# Patient Record
Sex: FEMALE | Race: OTHER | Employment: OTHER | URBAN - METROPOLITAN AREA
[De-identification: names, ages, dates, MRNs, and addresses within clinical notes are randomized per-mention and may not be internally consistent; named-entity substitution may affect disease eponyms.]

---

## 2019-05-17 ENCOUNTER — APPOINTMENT (OUTPATIENT)
Dept: GENERAL RADIOLOGY | Age: 69
DRG: 191 | End: 2019-05-17
Payer: MEDICARE

## 2019-05-17 ENCOUNTER — APPOINTMENT (OUTPATIENT)
Dept: CT IMAGING | Age: 69
DRG: 191 | End: 2019-05-17
Payer: MEDICARE

## 2019-05-17 ENCOUNTER — HOSPITAL ENCOUNTER (INPATIENT)
Age: 69
LOS: 9 days | Discharge: NURSING, REHAB OR LONG-TERM CARE | DRG: 191 | End: 2019-05-28
Attending: EMERGENCY MEDICINE | Admitting: INTERNAL MEDICINE
Payer: MEDICARE

## 2019-05-17 DIAGNOSIS — I63.81 CEREBROVASCULAR ACCIDENT (CVA) DUE TO OCCLUSION OF SMALL ARTERY (HCC): Primary | ICD-10-CM

## 2019-05-17 PROBLEM — G45.9 TIA (TRANSIENT ISCHEMIC ATTACK): Status: ACTIVE | Noted: 2019-05-17

## 2019-05-17 LAB
ALBUMIN SERPL-MCNC: 4.1 G/DL (ref 3.5–4.6)
ALP BLD-CCNC: 89 U/L (ref 40–130)
ALT SERPL-CCNC: 12 U/L (ref 0–33)
ANION GAP SERPL CALCULATED.3IONS-SCNC: 17 MEQ/L (ref 9–15)
AST SERPL-CCNC: 15 U/L (ref 0–35)
BACTERIA: ABNORMAL /HPF
BASOPHILS ABSOLUTE: 0.1 K/UL (ref 0–0.2)
BASOPHILS RELATIVE PERCENT: 0.6 %
BILIRUB SERPL-MCNC: 0.3 MG/DL (ref 0.2–0.7)
BILIRUBIN URINE: NEGATIVE
BLOOD, URINE: NEGATIVE
BUN BLDV-MCNC: 19 MG/DL (ref 8–23)
CALCIUM SERPL-MCNC: 9.3 MG/DL (ref 8.5–9.9)
CHLORIDE BLD-SCNC: 99 MEQ/L (ref 95–107)
CLARITY: CLEAR
CO2: 22 MEQ/L (ref 20–31)
COLOR: YELLOW
CREAT SERPL-MCNC: 0.68 MG/DL (ref 0.5–0.9)
EKG ATRIAL RATE: 101 BPM
EKG P AXIS: -6 DEGREES
EKG P-R INTERVAL: 156 MS
EKG Q-T INTERVAL: 342 MS
EKG QRS DURATION: 74 MS
EKG QTC CALCULATION (BAZETT): 443 MS
EKG R AXIS: 6 DEGREES
EKG T AXIS: -2 DEGREES
EKG VENTRICULAR RATE: 101 BPM
EOSINOPHILS ABSOLUTE: 0.5 K/UL (ref 0–0.7)
EOSINOPHILS RELATIVE PERCENT: 4.9 %
EPITHELIAL CELLS, UA: ABNORMAL /HPF (ref 0–5)
GFR AFRICAN AMERICAN: >60
GFR NON-AFRICAN AMERICAN: >60
GLOBULIN: 3.1 G/DL (ref 2.3–3.5)
GLUCOSE BLD-MCNC: 216 MG/DL (ref 60–115)
GLUCOSE BLD-MCNC: 219 MG/DL (ref 70–99)
GLUCOSE URINE: 250 MG/DL
HCT VFR BLD CALC: 40.9 % (ref 37–47)
HEMOGLOBIN: 13.9 G/DL (ref 12–16)
HYALINE CASTS: ABNORMAL /HPF (ref 0–5)
KETONES, URINE: NEGATIVE MG/DL
LEUKOCYTE ESTERASE, URINE: ABNORMAL
LYMPHOCYTES ABSOLUTE: 2.5 K/UL (ref 1–4.8)
LYMPHOCYTES RELATIVE PERCENT: 27 %
MCH RBC QN AUTO: 30.1 PG (ref 27–31.3)
MCHC RBC AUTO-ENTMCNC: 34 % (ref 33–37)
MCV RBC AUTO: 88.5 FL (ref 82–100)
MONOCYTES ABSOLUTE: 0.5 K/UL (ref 0.2–0.8)
MONOCYTES RELATIVE PERCENT: 4.9 %
NEUTROPHILS ABSOLUTE: 5.8 K/UL (ref 1.4–6.5)
NEUTROPHILS RELATIVE PERCENT: 62.6 %
NITRITE, URINE: POSITIVE
PDW BLD-RTO: 13.7 % (ref 11.5–14.5)
PERFORMED ON: ABNORMAL
PH UA: 5 (ref 5–9)
PLATELET # BLD: 262 K/UL (ref 130–400)
POTASSIUM SERPL-SCNC: 4.3 MEQ/L (ref 3.4–4.9)
PROTEIN UA: NEGATIVE MG/DL
RBC # BLD: 4.62 M/UL (ref 4.2–5.4)
RBC UA: ABNORMAL /HPF (ref 0–5)
SODIUM BLD-SCNC: 138 MEQ/L (ref 135–144)
SPECIFIC GRAVITY UA: 1.04 (ref 1–1.03)
TOTAL PROTEIN: 7.2 G/DL (ref 6.3–8)
TROPONIN: <0.01 NG/ML (ref 0–0.01)
URINE REFLEX TO CULTURE: YES
UROBILINOGEN, URINE: 0.2 E.U./DL
WBC # BLD: 9.3 K/UL (ref 4.8–10.8)
WBC UA: ABNORMAL /HPF (ref 0–5)

## 2019-05-17 PROCEDURE — 84484 ASSAY OF TROPONIN QUANT: CPT

## 2019-05-17 PROCEDURE — 70498 CT ANGIOGRAPHY NECK: CPT

## 2019-05-17 PROCEDURE — 94640 AIRWAY INHALATION TREATMENT: CPT

## 2019-05-17 PROCEDURE — G0378 HOSPITAL OBSERVATION PER HR: HCPCS

## 2019-05-17 PROCEDURE — 81001 URINALYSIS AUTO W/SCOPE: CPT

## 2019-05-17 PROCEDURE — 93005 ELECTROCARDIOGRAM TRACING: CPT

## 2019-05-17 PROCEDURE — 6370000000 HC RX 637 (ALT 250 FOR IP): Performed by: INTERNAL MEDICINE

## 2019-05-17 PROCEDURE — 6370000000 HC RX 637 (ALT 250 FOR IP): Performed by: EMERGENCY MEDICINE

## 2019-05-17 PROCEDURE — 36415 COLL VENOUS BLD VENIPUNCTURE: CPT

## 2019-05-17 PROCEDURE — 87077 CULTURE AEROBIC IDENTIFY: CPT

## 2019-05-17 PROCEDURE — 85025 COMPLETE CBC W/AUTO DIFF WBC: CPT

## 2019-05-17 PROCEDURE — 70450 CT HEAD/BRAIN W/O DYE: CPT

## 2019-05-17 PROCEDURE — 96365 THER/PROPH/DIAG IV INF INIT: CPT

## 2019-05-17 PROCEDURE — 99285 EMERGENCY DEPT VISIT HI MDM: CPT

## 2019-05-17 PROCEDURE — 6360000002 HC RX W HCPCS: Performed by: EMERGENCY MEDICINE

## 2019-05-17 PROCEDURE — 71045 X-RAY EXAM CHEST 1 VIEW: CPT

## 2019-05-17 PROCEDURE — 87086 URINE CULTURE/COLONY COUNT: CPT

## 2019-05-17 PROCEDURE — 70496 CT ANGIOGRAPHY HEAD: CPT

## 2019-05-17 PROCEDURE — 6360000004 HC RX CONTRAST MEDICATION: Performed by: EMERGENCY MEDICINE

## 2019-05-17 PROCEDURE — 2580000003 HC RX 258: Performed by: EMERGENCY MEDICINE

## 2019-05-17 PROCEDURE — 80053 COMPREHEN METABOLIC PANEL: CPT

## 2019-05-17 PROCEDURE — 87186 SC STD MICRODIL/AGAR DIL: CPT

## 2019-05-17 RX ORDER — CLOPIDOGREL BISULFATE 75 MG/1
75 TABLET ORAL ONCE
Status: COMPLETED | OUTPATIENT
Start: 2019-05-17 | End: 2019-05-17

## 2019-05-17 RX ORDER — ATORVASTATIN CALCIUM 40 MG/1
40 TABLET, FILM COATED ORAL NIGHTLY
Status: DISCONTINUED | OUTPATIENT
Start: 2019-05-17 | End: 2019-05-28 | Stop reason: HOSPADM

## 2019-05-17 RX ORDER — ONDANSETRON 2 MG/ML
4 INJECTION INTRAMUSCULAR; INTRAVENOUS EVERY 6 HOURS PRN
Status: DISCONTINUED | OUTPATIENT
Start: 2019-05-17 | End: 2019-05-28 | Stop reason: HOSPADM

## 2019-05-17 RX ORDER — CLOPIDOGREL 300 MG/1
300 TABLET, FILM COATED ORAL ONCE
Status: COMPLETED | OUTPATIENT
Start: 2019-05-17 | End: 2019-05-17

## 2019-05-17 RX ORDER — SODIUM CHLORIDE 0.9 % (FLUSH) 0.9 %
10 SYRINGE (ML) INJECTION PRN
Status: DISCONTINUED | OUTPATIENT
Start: 2019-05-17 | End: 2019-05-28 | Stop reason: HOSPADM

## 2019-05-17 RX ORDER — SODIUM CHLORIDE 0.9 % (FLUSH) 0.9 %
10 SYRINGE (ML) INJECTION EVERY 12 HOURS SCHEDULED
Status: DISCONTINUED | OUTPATIENT
Start: 2019-05-17 | End: 2019-05-28 | Stop reason: HOSPADM

## 2019-05-17 RX ORDER — ASPIRIN 81 MG/1
81 TABLET ORAL DAILY
Status: DISCONTINUED | OUTPATIENT
Start: 2019-05-18 | End: 2019-05-28 | Stop reason: HOSPADM

## 2019-05-17 RX ORDER — LABETALOL 20 MG/4 ML (5 MG/ML) INTRAVENOUS SYRINGE
10 EVERY 10 MIN PRN
Status: DISCONTINUED | OUTPATIENT
Start: 2019-05-17 | End: 2019-05-28 | Stop reason: HOSPADM

## 2019-05-17 RX ORDER — IPRATROPIUM BROMIDE AND ALBUTEROL SULFATE 2.5; .5 MG/3ML; MG/3ML
1 SOLUTION RESPIRATORY (INHALATION) EVERY 8 HOURS
Status: DISCONTINUED | OUTPATIENT
Start: 2019-05-17 | End: 2019-05-18

## 2019-05-17 RX ORDER — ALBUTEROL SULFATE 90 UG/1
2 AEROSOL, METERED RESPIRATORY (INHALATION) EVERY 6 HOURS PRN
COMMUNITY

## 2019-05-17 RX ORDER — 0.9 % SODIUM CHLORIDE 0.9 %
500 INTRAVENOUS SOLUTION INTRAVENOUS ONCE
Status: COMPLETED | OUTPATIENT
Start: 2019-05-17 | End: 2019-05-17

## 2019-05-17 RX ADMIN — IOPAMIDOL 100 ML: 755 INJECTION, SOLUTION INTRAVENOUS at 20:15

## 2019-05-17 RX ADMIN — SODIUM CHLORIDE 500 ML: 9 INJECTION, SOLUTION INTRAVENOUS at 19:53

## 2019-05-17 RX ADMIN — IPRATROPIUM BROMIDE AND ALBUTEROL SULFATE 1 AMPULE: .5; 3 SOLUTION RESPIRATORY (INHALATION) at 23:57

## 2019-05-17 RX ADMIN — CEFTRIAXONE SODIUM 1 G: 1 INJECTION, POWDER, FOR SOLUTION INTRAMUSCULAR; INTRAVENOUS at 21:12

## 2019-05-17 RX ADMIN — CLOPIDOGREL BISULFATE 300 MG: 300 TABLET, FILM COATED ORAL at 19:54

## 2019-05-17 RX ADMIN — CLOPIDOGREL BISULFATE 75 MG: 75 TABLET, FILM COATED ORAL at 20:25

## 2019-05-17 ASSESSMENT — ENCOUNTER SYMPTOMS
BLOOD IN STOOL: 0
WHEEZING: 0
TROUBLE SWALLOWING: 0
PHOTOPHOBIA: 0
COUGH: 0
STRIDOR: 0
ANAL BLEEDING: 0
ABDOMINAL DISTENTION: 0
SINUS PRESSURE: 0
CONSTIPATION: 0
NAUSEA: 0
EYE ITCHING: 0
CHEST TIGHTNESS: 0
DIARRHEA: 0
COLOR CHANGE: 0
RHINORRHEA: 0
ABDOMINAL PAIN: 0
FACIAL SWELLING: 0
EYE REDNESS: 0
EYE PAIN: 0
SHORTNESS OF BREATH: 0
VOICE CHANGE: 0
EYE DISCHARGE: 0
CHOKING: 0
SORE THROAT: 0
VOMITING: 0
BACK PAIN: 0

## 2019-05-17 NOTE — ED TRIAGE NOTES
Pt c/o trouble moving her right leg after waking up from a nap today aprrox 1700, last known well time was 1400 today, Pt is A&OX3, calm, afebrile, breathes are equal and unlabored, sensation intact in RLE, pulses palpable, Pt has slight leeft side facial droop, denies pain, SOB, N/V/D, and dizziness.

## 2019-05-18 ENCOUNTER — APPOINTMENT (OUTPATIENT)
Dept: MRI IMAGING | Age: 69
DRG: 191 | End: 2019-05-18
Payer: MEDICARE

## 2019-05-18 ENCOUNTER — APPOINTMENT (OUTPATIENT)
Dept: ULTRASOUND IMAGING | Age: 69
DRG: 191 | End: 2019-05-18
Payer: MEDICARE

## 2019-05-18 ENCOUNTER — APPOINTMENT (OUTPATIENT)
Dept: GENERAL RADIOLOGY | Age: 69
DRG: 191 | End: 2019-05-18
Payer: MEDICARE

## 2019-05-18 LAB
ANION GAP SERPL CALCULATED.3IONS-SCNC: 13 MEQ/L (ref 9–15)
BUN BLDV-MCNC: 12 MG/DL (ref 8–23)
CALCIUM SERPL-MCNC: 8.8 MG/DL (ref 8.5–9.9)
CHLORIDE BLD-SCNC: 101 MEQ/L (ref 95–107)
CHOLESTEROL, TOTAL: 147 MG/DL (ref 0–199)
CO2: 24 MEQ/L (ref 20–31)
CREAT SERPL-MCNC: 0.51 MG/DL (ref 0.5–0.9)
GFR AFRICAN AMERICAN: >60
GFR NON-AFRICAN AMERICAN: >60
GLUCOSE BLD-MCNC: 152 MG/DL (ref 60–115)
GLUCOSE BLD-MCNC: 169 MG/DL (ref 60–115)
GLUCOSE BLD-MCNC: 243 MG/DL (ref 70–99)
GLUCOSE BLD-MCNC: 247 MG/DL (ref 60–115)
GLUCOSE BLD-MCNC: 248 MG/DL (ref 60–115)
GLUCOSE BLD-MCNC: 267 MG/DL (ref 60–115)
GLUCOSE BLD-MCNC: 293 MG/DL (ref 60–115)
HBA1C MFR BLD: 10 % (ref 4.8–5.9)
HCT VFR BLD CALC: 38.1 % (ref 37–47)
HDLC SERPL-MCNC: 45 MG/DL (ref 40–59)
HEMOGLOBIN: 12.5 G/DL (ref 12–16)
LDL CHOLESTEROL CALCULATED: 83 MG/DL (ref 0–129)
LV EF: 65 %
LVEF MODALITY: NORMAL
MCH RBC QN AUTO: 28.9 PG (ref 27–31.3)
MCHC RBC AUTO-ENTMCNC: 32.9 % (ref 33–37)
MCV RBC AUTO: 87.7 FL (ref 82–100)
PDW BLD-RTO: 13.8 % (ref 11.5–14.5)
PERFORMED ON: ABNORMAL
PLATELET # BLD: 231 K/UL (ref 130–400)
POTASSIUM SERPL-SCNC: 3.6 MEQ/L (ref 3.4–4.9)
RBC # BLD: 4.34 M/UL (ref 4.2–5.4)
SODIUM BLD-SCNC: 138 MEQ/L (ref 135–144)
TRIGL SERPL-MCNC: 97 MG/DL (ref 0–150)
TROPONIN: <0.01 NG/ML (ref 0–0.01)
TROPONIN: <0.01 NG/ML (ref 0–0.01)
WBC # BLD: 8.2 K/UL (ref 4.8–10.8)

## 2019-05-18 PROCEDURE — G0378 HOSPITAL OBSERVATION PER HR: HCPCS

## 2019-05-18 PROCEDURE — 94664 DEMO&/EVAL PT USE INHALER: CPT

## 2019-05-18 PROCEDURE — 93306 TTE W/DOPPLER COMPLETE: CPT

## 2019-05-18 PROCEDURE — 6370000000 HC RX 637 (ALT 250 FOR IP): Performed by: INTERNAL MEDICINE

## 2019-05-18 PROCEDURE — 71046 X-RAY EXAM CHEST 2 VIEWS: CPT

## 2019-05-18 PROCEDURE — 94640 AIRWAY INHALATION TREATMENT: CPT

## 2019-05-18 PROCEDURE — 97162 PT EVAL MOD COMPLEX 30 MIN: CPT

## 2019-05-18 PROCEDURE — 97166 OT EVAL MOD COMPLEX 45 MIN: CPT

## 2019-05-18 PROCEDURE — 6360000002 HC RX W HCPCS: Performed by: INTERNAL MEDICINE

## 2019-05-18 PROCEDURE — 96372 THER/PROPH/DIAG INJ SC/IM: CPT

## 2019-05-18 PROCEDURE — 80061 LIPID PANEL: CPT

## 2019-05-18 PROCEDURE — 2580000003 HC RX 258: Performed by: INTERNAL MEDICINE

## 2019-05-18 PROCEDURE — 85027 COMPLETE CBC AUTOMATED: CPT

## 2019-05-18 PROCEDURE — 83036 HEMOGLOBIN GLYCOSYLATED A1C: CPT

## 2019-05-18 PROCEDURE — 94760 N-INVAS EAR/PLS OXIMETRY 1: CPT

## 2019-05-18 PROCEDURE — 84484 ASSAY OF TROPONIN QUANT: CPT

## 2019-05-18 PROCEDURE — 80048 BASIC METABOLIC PNL TOTAL CA: CPT

## 2019-05-18 PROCEDURE — 94150 VITAL CAPACITY TEST: CPT

## 2019-05-18 PROCEDURE — 36415 COLL VENOUS BLD VENIPUNCTURE: CPT

## 2019-05-18 PROCEDURE — 70551 MRI BRAIN STEM W/O DYE: CPT

## 2019-05-18 PROCEDURE — 93880 EXTRACRANIAL BILAT STUDY: CPT

## 2019-05-18 RX ORDER — ALBUTEROL SULFATE 2.5 MG/3ML
2.5 SOLUTION RESPIRATORY (INHALATION)
Status: DISCONTINUED | OUTPATIENT
Start: 2019-05-18 | End: 2019-05-19

## 2019-05-18 RX ORDER — WATER FOR INJ.,BACTERIOSTATIC
VIAL (ML) INJECTION
Status: DISPENSED
Start: 2019-05-18 | End: 2019-05-18

## 2019-05-18 RX ORDER — DEXTROSE MONOHYDRATE 50 MG/ML
100 INJECTION, SOLUTION INTRAVENOUS PRN
Status: DISCONTINUED | OUTPATIENT
Start: 2019-05-18 | End: 2019-05-28 | Stop reason: HOSPADM

## 2019-05-18 RX ORDER — NICOTINE POLACRILEX 4 MG
15 LOZENGE BUCCAL PRN
Status: DISCONTINUED | OUTPATIENT
Start: 2019-05-18 | End: 2019-05-28 | Stop reason: HOSPADM

## 2019-05-18 RX ORDER — IPRATROPIUM BROMIDE AND ALBUTEROL SULFATE 2.5; .5 MG/3ML; MG/3ML
1 SOLUTION RESPIRATORY (INHALATION) 3 TIMES DAILY
Status: DISCONTINUED | OUTPATIENT
Start: 2019-05-18 | End: 2019-05-19

## 2019-05-18 RX ORDER — DEXTROSE MONOHYDRATE 25 G/50ML
12.5 INJECTION, SOLUTION INTRAVENOUS PRN
Status: DISCONTINUED | OUTPATIENT
Start: 2019-05-18 | End: 2019-05-28 | Stop reason: HOSPADM

## 2019-05-18 RX ORDER — GUAIFENESIN 600 MG/1
600 TABLET, EXTENDED RELEASE ORAL 2 TIMES DAILY
Status: DISCONTINUED | OUTPATIENT
Start: 2019-05-18 | End: 2019-05-28 | Stop reason: HOSPADM

## 2019-05-18 RX ADMIN — Medication 10 ML: at 20:38

## 2019-05-18 RX ADMIN — ENOXAPARIN SODIUM 40 MG: 40 INJECTION SUBCUTANEOUS at 09:29

## 2019-05-18 RX ADMIN — GUAIFENESIN 600 MG: 600 TABLET, EXTENDED RELEASE ORAL at 01:40

## 2019-05-18 RX ADMIN — ATORVASTATIN CALCIUM 40 MG: 40 TABLET, FILM COATED ORAL at 01:40

## 2019-05-18 RX ADMIN — INSULIN LISPRO 6 UNITS: 100 INJECTION, SOLUTION INTRAVENOUS; SUBCUTANEOUS at 12:21

## 2019-05-18 RX ADMIN — ASPIRIN 81 MG: 81 TABLET, COATED ORAL at 09:29

## 2019-05-18 RX ADMIN — INSULIN LISPRO 4 UNITS: 100 INJECTION, SOLUTION INTRAVENOUS; SUBCUTANEOUS at 09:29

## 2019-05-18 RX ADMIN — GUAIFENESIN 600 MG: 600 TABLET, EXTENDED RELEASE ORAL at 20:37

## 2019-05-18 RX ADMIN — IPRATROPIUM BROMIDE AND ALBUTEROL SULFATE 1 AMPULE: .5; 3 SOLUTION RESPIRATORY (INHALATION) at 14:24

## 2019-05-18 RX ADMIN — IPRATROPIUM BROMIDE AND ALBUTEROL SULFATE 1 AMPULE: .5; 3 SOLUTION RESPIRATORY (INHALATION) at 07:49

## 2019-05-18 RX ADMIN — IPRATROPIUM BROMIDE AND ALBUTEROL SULFATE 1 AMPULE: .5; 3 SOLUTION RESPIRATORY (INHALATION) at 19:39

## 2019-05-18 RX ADMIN — ATORVASTATIN CALCIUM 40 MG: 40 TABLET, FILM COATED ORAL at 20:37

## 2019-05-18 RX ADMIN — Medication 10 ML: at 01:44

## 2019-05-18 NOTE — ED NOTES
Patient to bedside commode with standby assist, she was stable on feet.       Karyle Do, RN  05/17/19 2034

## 2019-05-18 NOTE — PROGRESS NOTES
[facility] EMERGENCY [facility] AT [place] Respiratory Therapy Evaluation   Current Order: Alexis Caraballo Q8     Home Regimen: PRN    Ordering Physician: Debbie  Re-evaluation Date:  5/21     Diagnosis: CVA     Patient Status: Stable / Unstable + Physician notified    The following MDI Criteria must be met in order to convert aerosol to MDI with spacer. If unable to meet, MDI will be converted to aerosol:  []  Patient able to demonstrate the ability to use MDI effectively  []  Patient alert and cooperative  []  Patient able to take deep breath with 5-10 second hold  []  Medication(s) available in this delivery method   []  Peak flow greater than or equal to 200 ml/min            Current Order Substituted To  (same drug, same frequency)   Aerosol to MDI [] Albuterol Sulfate 0.083% unit dose by aerosol Albuterol Sulfate MDI 2 puffs by inhalation with spacer    [] Levalbuterol 1.25 mg unit dose by aerosol Levalbuterol MDI 2 puffs by inhalation with spacer    [] Levalbuterol 0.63 mg unit dose by aerosol Levalbuterol MDI 2 puffs by inhalation with spacer    [] Ipratropium Bromide 0.02% unit dose by aerosol Ipratropium Bromide MDI 2 puffs by inhalation with spacer    [] Duoneb (Ipratropium + Albuterol) unit dose by aerosol Ipratropium MDI + Albuterol MDI 2 puffs by inhalation w/spacer   MDI to Aerosol [] Albuterol Sulfate MDI Albuterol Sulfate 0.083% unit dose by aerosol    [] Levalbuterol MDI 2 puffs by inhalation Levalbuterol 1.25 mg unit dose by aerosol    [] Ipratropium Bromide MDI by inhalation Ipratropium Bromide 0.02% unit dose by aerosol    [] Combivent (Ipratropium + Albuterol) MDI by inhalation Duoneb (Ipratropium + Albuterol) unit dose by aerosol   Treatment Assessment [Frequency/Schedule]:  Change frequency to: ______________Duoneb QID & Albuterol Q2 PRN____________________________________per Protocol, P&T, MEC      Points 0 1 2 3 4   Pulmonary Status  Non-Smoker  []   Smoking history   < 20 pack years  []   Smoking history  ?  20 pack years  [] Pulmonary Disorder  (acute or chronic)  []   Severe or Chronic w/ Exacerbation  [x]     Surgical Status No [x]   Surgeries     General []   Surgery Lower []   Abdominal Thoracic or []   Upper Abdominal Thoracic with  PulmonaryDisorder  []     Chest X-ray Clear/Not  Ordered     []  Chronic Changes  Results Pending  [x]  Infiltrates, atelectasis, pleural effusion, or edema  []  Infiltrates in more than one lobe []  Infiltrate + Atelectasis, &/or pleural effusion  []    Respiratory Pattern Regular,  RR = 12-20 [x]  Increased,  RR = 21-25 [x]  ALBERTS, irregular,  or RR = 26-30 []  Decreased FEV1  or RR = 31-35 []  Severe SOB, use  of accessory muscles, or RR ? 35  []    Mental Status Alert, oriented,  Cooperative [x]  Confused but Follows commands []  Lethargic or unable to follow commands []  Obtunded  []  Comatose  []    Breath Sounds Clear to  auscultation  []  Decreased unilaterally or  in bases only []  Decreased  bilaterally  []  Crackles or intermittent wheezes []  Wheezes [x]    Cough Strong, Spontan., & nonproductive [x]  Strong,  spontaneous, &  productive []  Weak,  Nonproductive []  Weak, productive or  with wheezes []  No spontaneous  cough or may require suctioning []    Level of Activity Ambulatory []  Ambulatory w/ Assist  [x]  Non-ambulatory []  Paraplegic []  Quadriplegic []    Total    Score:____11___     Triage Score:___3_____      Tri       Triage:     1. (>20) Freq: Q3    2. (16-20) Freq: Q4   3. (11-15) Freq: QID & Albuterol Q2 PRN    4. (6-10) Freq: TID & Albuterol Q2 PRN    5. (0-5) Freq Q4prn

## 2019-05-18 NOTE — PROGRESS NOTES
Status:  Vision  Vision: Impaired  Vision Exceptions: Wears glasses for reading  Hearing  Hearing: Within functional limits     ROM:   LUE AROM (degrees)  LUE AROM : WFL  Left Hand AROM (degrees)  Left Hand AROM: WFL  RUE AROM (degrees)  RUE AROM : WFL  Right Hand AROM (degrees)  Right Hand AROM: WFL    Strength:  LUE Strength  L Hand Grasp: 4-/5  L Hand Release: 4-/5  LUE Strength Comment: 4/5  RUE Strength  R Hand Grasp: 4/5  R Hand Release: 4/5  RUE Strength Comment: 4/5     Coordination, Tone, Quality of Movement: Tone RUE  RUE Tone: Normotonic  Tone LUE  LUE Tone: Normotonic  Coordination  Movements Are Fluid And Coordinated: No  Coordination and Movement description: Decreased speed, Right UE, Left UE    Hand Dominance:  Hand Dominance  Hand Dominance: Left    ADL Status:  ADL  Feeding: Independent  Grooming: Setup  UE Bathing: Supervision  LE Bathing: Stand by assistance, Contact guard assistance  UE Dressing: Setup  LE Dressing: Contact guard assistance, Stand by assistance  Toileting: Stand by assistance  Additional Comments: ADL's simulated or perfomred on [facility]  Toilet Transfers  Toilet Transfer: Stand by assistance, Contact guard assistance  Toilet Transfers Comments: from [facility]       Functional Mobility:  Functional Mobility  Functional - Mobility Device: Rolling Walker  Activity: Other(BSC to chair )  Assist Level: Contact guard assistance(SBA/CGA)  Transfers  Sit to stand: Stand by assistance, Contact guard assistance  Stand to sit: Contact guard assistance, Stand by assistance    Bed Mobility  Bed mobility  Supine to Sit: Unable to assess  Sit to Supine: Unable to assess  Comment: Pt seated prior and after OT eval.     Seated and Standing Balance:  Balance  Sitting Balance: Supervision  Standing Balance: Contact guard assistance    Functional Endurance:  Activity Tolerance  Activity Tolerance: Fair SPO2 98% prior and after OT eval while donning O2.      D/C Recommendations:    Equipment

## 2019-05-18 NOTE — PROGRESS NOTES
[facility] Occupational Therapy      Date: 2019  Patient Name: Josie Eubanks        MRN: [id number]  Account: [de-identified]   : 1950  (79 y.o.)  [facility]    Pt. is of observation status. To comply with medicare and insurance regulations, to see patient we require updated orders including a valid OT treatment diagnosis. Please reorder as appropriate.      Electronically signed by GWEN Lee on 2019 at 11:57 AM

## 2019-05-18 NOTE — PROGRESS NOTES
This patient is currently in Observation/Out-Patient Status. Due to Medicare, other insurance and Hospital Guidelines, a written order with a therapy specific diagnosis (dysphagia, dysarthria, aphasia) must be in the chart before we can initiate the evaluation.   [id number] [facility] Ada Velazquez notified  Thank You Shaina Hanks  Electronically signed by IMAN Cuellar on 5/18/2019 at 10:35 AM

## 2019-05-18 NOTE — H&P
Hospital Medicine  History and Physical    Patient:  Delon Castillo  MRN: [id number]    CHIEF COMPLAINT:    Chief Complaint   Patient presents with    Extremity Weakness     right leg weakness, woke up from a nap at 1730 today and right leg is weak       History Obtained From:  patient, electronic medical record  Primary Care Physician: Hilary Miller MD    HISTORY OF PRESENT ILLNESS:   The patient is a 79 y.o. female with a history of HTN, DLD, DMII, Asthma presenting from home with RLE weakness. Pt took a nap around 2pm, woke up around 530 with RLE weakness. She was not able to walk. No visual changes. No sensory deficit. She was not able to walk around her house so she called her brother to bring her to ED. On arrival symptoms improved. Neuro was contacted and reccommended 300 mg plavix. Pt notes that she has had a TIA in the past, with similar RLE weakness around two weeks ago that resolved spontaneously. She otherwise has HTN and takes an unkown pill and is on metformin for DM. Denies any fevers chills, chest pain, palpitations, nvdc. In ED vitals are stable, she is afebrile. Labs WNL. UA does show pyuria with moderate bacteria, but she denies any frequency, dysuria, or feeling of incomplete void. Past Medical History:      Diagnosis Date    Asthma     Bladder infection     Chicken pox     Chronic back pain     Hypertension     Measles     Mumps     Osteoarthritis     Pneumonia     Whooping cough        Past Surgical History:      Procedure Laterality Date    APPENDECTOMY  1978    HYSTERECTOMY  26    TONSILLECTOMY  1966       Medications Prior to Admission:    Prior to Admission medications    Medication Sig Start Date End Date Taking?  Authorizing Provider   albuterol sulfate  (90 Base) MCG/ACT inhaler Inhale 2 puffs into the lungs every 6 hours as needed for Wheezing   Yes Historical Provider, MD   metFORMIN (GLUCOPHAGE) 1000 MG tablet Take 1,000 mg by mouth 2 times daily (with meals) Yes Historical Provider, MD   HYDROcodone-acetaminophen [facility])  MG per tablet Take 1 tablet by mouth 2 times daily as needed for Pain 2/3/16 3/4/16  Surya Lizama MD   HYDROcodone-acetaminophen [facility])  MG per tablet Take 1 tablet by mouth 3 times daily as needed for Pain 8/10/15 9/9/15  Surya Lizama MD   HYDROcodone-acetaminophen [facility])  MG per tablet Take 1 tablet by mouth every 8 hours as needed for Pain 7/8/15 8/7/15  Surya Lizama MD       Allergies:  Patient has no known allergies. Social History:   TOBACCO:   reports that she quit smoking about 9 years ago. She has never used smokeless tobacco.  ETOH:   reports that she does not drink alcohol. OCCUPATION:  none    Family History:       Problem Relation Age of Onset    Cancer Mother     Cancer Father        REVIEW OF SYSTEMS:  Ten systems reviewed and negative except for as above. Physical Exam:    Vitals: /79   Pulse 91   Temp 98 °F (36.7 °C) (Oral)   Resp 20   Ht 5' 3\" (1.6 m)   Wt 195 lb (88.5 kg)   LMP  (LMP Unknown)   SpO2 100%   BMI 34.54 kg/m²   Constitutional: alert, appears stated age and cooperative  Skin: Skin color, texture, turgor normal. No rashes or lesions  Eyes:Eye: Normal external eye, conjunctiva, JEROME. ENT: Head: Normocephalic, no lesions, without obvious abnormality. Neck: no adenopathy, no carotid bruit, no JVD, supple, symmetrical, trachea midline and thyroid not enlarged, symmetric, no tenderness/mass/nodules  Respiratory: clear to auscultation bilaterally  Cardiovascular: regular rate and rhythm, S1, S2 normal, no murmur, click, rub or gallop  Gastrointestinal: soft, non-tender; bowel sounds normal; no masses,  no organomegaly  Genitourinary: Deferred  Musculoskeletal:extremities normal, atraumatic, no cyanosis or edema  Neurologic: Mental status AAOx3 No facial asymmetry or droop. Normal muscle strength b/l. NIHSS0  Psychiatric: Appropriate mood and affect.  Good insight and

## 2019-05-18 NOTE — PROGRESS NOTES
Note patient had C/O SOB and resp called for PRN TX. Respitory came and patient refused at that time despite education. Resp left . After a while patient went to MRI. At MRI patient was SOB again with SPO2 in mid to high 90s. Resp called to give PRN down at MRI.

## 2019-05-18 NOTE — CARE COORDINATION
I met with patient regarding discharge planning. She states she lives alone. She uses a walker. She has O2 on here but no home O2. She does have a nebulizer. She states she can go stay with her brother at discharge if needed but she would prefer to go somewhere for rehab. Therapy just assessed and they are recommending rehab vs SNF at discharge. I gave her the SNF list.  She is currently only meeting obs status. She will have an MRI shortly to see if she is positive for a stroke. She will review the list and I will meet with her again tomorrow.   Electronically signed by Carina Elam RN on 5/18/19 at 1:47 PM

## 2019-05-18 NOTE — PROGRESS NOTES
Physical Therapy Med Surg Initial Assessment  Facility/Department: [facility]  Room: N225/N225-01       NAME: Xiomy Lundberg  : 1950 (79 y.o.)  MRN: [id number]  CODE STATUS: Full Code    Date of Service: 2019    Patient Diagnosis(es): TIA (transient ischemic attack) [G45.9]   Chief Complaint   Patient presents with    Extremity Weakness     right leg weakness, woke up from a nap at 1730 today and right leg is weak     Patient Active Problem List    Diagnosis Date Noted    TIA (transient ischemic attack) 2019    Lumbosacral spondylosis without myelopathy 2015    Cervical spondylosis without myelopathy 2015        Past Medical History:   Diagnosis Date    Asthma     Bladder infection     Chicken pox     Chronic back pain     Hypertension     Measles     Mumps     Osteoarthritis     Pneumonia     Whooping cough      Past Surgical History:   Procedure Laterality Date    APPENDECTOMY      HYSTERECTOMY      TONSILLECTOMY         Chart Reviewed: Yes  Patient assessed for rehabilitation services?: Yes  Family / Caregiver Present: No    Restrictions:  Restrictions/Precautions: Fall Risk     SUBJECTIVE: Subjective: Pt denies pain, reports she still has R sided LE weakness  Response To Previous Treatment: Not applicable  Pre Treatment Pain Screening  Pain at present: 0  Scale Used: Numeric Score    Post Treatment Pain Screening:   Pain Screening  Patient Currently in Pain: No    Prior Level of Function:  Social/Functional History  Lives With: Alone  Type of Home: Apartment  Home Layout: One level  Home Access: Level entry  Bathroom Shower/Tub: Tub/Shower unit  Bathroom Equipment: Shower chair  Home Equipment: Rolling walker, Oxygen(pt unsure of L o2 at home )  ADL Assistance: Independent  Homemaking Assistance: Needs assistance(pt reported dtr assist with cleaning )  Ambulation Assistance: Independent  Transfer Assistance: Independent  Active : No(dtr drives )    OBJECTIVE:   Vision/Hearing:  Vision: Impaired  Vision Exceptions: Wears glasses for reading  Hearing: Within functional limits    Cognition:  Follows Commands: Within Functional Limits    Observation/Palpation  Observation: Pt on BSC, O2 at 2L. No signs of distress. ROM:   limited R ankle ROM d/t strength, foot drop    Strength:  Strength RLE  Strength RLE: Exception  R Hip Flexion: 3/5  R Hip ABduction: 3/5  R Hip ADduction: 3+/5  R Knee Flexion: 3+/5  R Knee Extension: 3+/5  R Ankle Dorsiflexion: 2-/5  R Ankle Plantar flexion: 2/5  Strength LLE  Comment: grossly 4+/5    Neuro:  Balance  Sitting - Static: Good  Sitting - Dynamic: Good  Standing - Static: Fair  Standing - Dynamic: Fair;-     Motor Control  Gross Motor?: Exceptions  Comments: R LE weakness  Sensation  Overall Sensation Status: WFL(pt denies n/t )    Bed mobility  Supine to Sit: Unable to assess  Sit to Supine: Unable to assess  Comment: Pt sitting on commode prior to PT arrival    Transfers  Sit to Stand: Contact guard assistance  Stand to sit: Contact guard assistance    Ambulation  Ambulation?: Yes  Ambulation 1  Surface: level tile  Device: Rolling Walker  Assistance: Contact guard assistance  Quality of Gait: significant weakness in R LE, decreased step length, drop foot (incompensated), decrease foot clearance, toed out  Distance: 25ft  Comments: with turns, VC for safety with negotiating room environement and safety with turning with 2ww    Activity Tolerance  Activity Tolerance: Patient Tolerated treatment well    Exercises  Comments: seated HEP handout reviewed and given to pt this date     ASSESSMENT:   Body structures, Functions, Activity limitations: Decreased functional mobility ; Decreased ROM; Decreased balance;Decreased strength;Decreased high-level IADLs  Decision Making: Medium Complexity  History: med  Exam: med  Clinical Presentation: med    Prognosis: Good  Patient Education: PT POC, PT dx, PT prognosis, HEP handout given  Barriers to Learning: none    DISCHARGE RECOMMENDATIONS:  Discharge Recommendations: Continue to assess pending progress    Assessment: Pt demonstrates the above deficits and decline in functional mobility status placing them at increased risk for falls. Pt would benefit from physical therapy to address above deficits and allow for safe return home at highest level of function, decrease risk for falls, and improve QOL. REQUIRES PT FOLLOW UP: Yes      PLAN OF CARE:  Plan  Times per week: 3-6  Current Treatment Recommendations: Strengthening, ROM, Functional Mobility Training, Neuromuscular Re-education, Manual Therapy - Joint Manipulation, Home Exercise Program, Equipment Evaluation, Education, & procurement, Modalities, Manual Therapy - Soft Tissue Mobilization, Gait Training, Transfer Training, Balance Training, Endurance Training, Stair training, Pain Management, Patient/Caregiver Education & Training, Safety Education & Training, Positioning  Safety Devices  Type of devices:  All fall risk precautions in place, Left in chair, Chair alarm in place    Goals:  Patient goals : \"walk better, go home\"  Long term goals  Long term goal 1: indep with HEP to improve R LE strength  Long term goal 2: bed mobility with indep   Long term goal 3: functional transfers with mod I  Long term goal 4: amb 50ft with LRAD and mod I    AMPAC (6 CLICK) BASIC MOBILITY  AM-PAC Inpatient Mobility Raw Score : 16     Therapy Time:   Individual   Time In 1321   Time Out 1338   Minutes [address], [address] [facility], 05/18/19 at 1:47 PM

## 2019-05-18 NOTE — PROGRESS NOTES
Hospitalist Progress Note      PCP: Urvashi Hua MD    Date of Admission: 5/17/2019    Chief Complaint:  No acute events overnight,afebrile,stable HD    Medications:  Reviewed    Infusion Medications    dextrose       Scheduled Medications    ipratropium-albuterol  1 ampule Inhalation TID    insulin lispro  0-12 Units Subcutaneous TID WC    insulin lispro  0-6 Units Subcutaneous Nightly    guaiFENesin  600 mg Oral BID    water, bacteriostatic        sodium chloride flush  10 mL Intravenous 2 times per day    enoxaparin  40 mg Subcutaneous Daily    aspirin  81 mg Oral Daily    atorvastatin  40 mg Oral Nightly     PRN Meds: albuterol, glucose, dextrose, glucagon (rDNA), dextrose, sodium chloride flush, magnesium hydroxide, ondansetron, labetalol      Intake/Output Summary (Last 24 hours) at 5/18/2019 1331  Last data filed at 5/17/2019 2203  Gross per 24 hour   Intake 550 ml   Output --   Net 550 ml       Exam:    BP (!) 142/60   Pulse 86   Temp 98 °F (36.7 °C) (Oral)   Resp 18   Ht 5' 3\" (1.6 m)   Wt 195 lb (88.5 kg)   LMP  (LMP Unknown)   SpO2 99%   BMI 34.54 kg/m²     General appearance: No apparent distress, appears stated age and cooperative. Respiratory:  Clear to auscultation, bilaterally without Rales/Wheezes/Rhonchi. Cardiovascular: Regular rate and rhythm with normal S1/S2 . Abdomen: Soft, non-tender, active bowel sounds. Musculoskeletal: No cyanosis or edema bilaterally. Labs:   Recent Labs     05/17/19 1913 05/18/19  0601   WBC 9.3 8.2   HGB 13.9 12.5   HCT 40.9 38.1    231     Recent Labs     05/17/19 1913 05/18/19  0601    138   K 4.3 3.6   CL 99 101   CO2 22 24   BUN 19 12   CREATININE 0.68 0.51   CALCIUM 9.3 8.8     Recent Labs     05/17/19 1913   AST 15   ALT 12   BILITOT 0.3   ALKPHOS 89     No results for input(s): INR in the last 72 hours.   Recent Labs     05/17/19 1913 05/18/19  0601 05/18/19  1154   TROPONINI <0.010 <0.010 <0.010       Urinalysis:

## 2019-05-18 NOTE — CONSULTS
Consult to Neurology  Consult performed by: Kenneth Garcia MD  Consult ordered by: Miki Crooks DO          Patient Name: Rayshawn Mcdonald  Admit Date: 2019  6:50 PM  MR #: [id number]  : 1950    Attending Physician: Edwin Kiser MD  Reason for consult: Right lower extremity weakness    History of Presenting Illness:      Rayshawn Mcdonald is a 79 y.o. female, who according to the H and P, \"with a history of HTN, DLD, DMII, Asthma presenting from home with RLE weakness. Pt took a nap around 2pm, woke up around 530 with RLE weakness. She was not able to walk. No visual changes. No sensory deficit. She was not able to walk around her house so she called her brother to bring her to ED. On arrival symptoms improved. Neuro was contacted and reccommended 300 mg plavix. Pt notes that she has had a TIA in the past, with similar RLE weakness around two weeks ago that resolved spontaneously. She otherwise has HTN and takes an unkown pill and is on metformin for DM. Denies any fevers chills, chest pain, palpitations, nvdc. In ED vitals are stable, she is afebrile. Labs WNL. UA does show pyuria with moderate bacteria, but she denies any frequency, dysuria, or feeling of incomplete void. \"     Still feels some right leg weakness. Shortness of breath due to her underlying asthma, just finishing a breathing treatment.     History:      Past Medical History:   Diagnosis Date    Asthma     Bladder infection     Chicken pox     Chronic back pain     Hypertension     Measles     Mumps     Osteoarthritis     Pneumonia     Whooping cough      Past Surgical History:   Procedure Laterality Date    APPENDECTOMY  1978    HYSTERECTOMY  26    TONSILLECTOMY  1966       Family History  Family History   Problem Relation Age of Onset    Cancer Mother     Cancer Father      [] Unable to obtain due to ventilated and/ or neurologic status    Social History     Socioeconomic History    Marital status:      Spouse name: Not on file    Number of children: Not on file    Years of education: Not on file    Highest education level: Not on file   Occupational History    Not on file   Social Needs    Financial resource strain: Not on file    Food insecurity:     Worry: Not on file     Inability: Not on file    Transportation needs:     Medical: Not on file     Non-medical: Not on file   Tobacco Use    Smoking status: Former Smoker     Last attempt to quit: 10/19/2009     Years since quittin.5    Smokeless tobacco: Never Used   Substance and Sexual Activity    Alcohol use: No     Comment: recovering alcoholic    Drug use: Never    Sexual activity: Not on file   Lifestyle    Physical activity:     Days per week: Not on file     Minutes per session: Not on file    Stress: Not on file   Relationships    Social connections:     Talks on phone: Not on file     Gets together: Not on file     Attends Druze service: Not on file     Active member of club or organization: Not on file     Attends meetings of clubs or organizations: Not on file     Relationship status: Not on file    Intimate partner violence:     Fear of current or ex partner: Not on file     Emotionally abused: Not on file     Physically abused: Not on file     Forced sexual activity: Not on file   Other Topics Concern    Not on file   Social History Narrative    Not on file      [] Unable to obtain due to ventilated and/ or neurologic status      Home Medications:      Medications Prior to Admission: albuterol sulfate  (90 Base) MCG/ACT inhaler, Inhale 2 puffs into the lungs every 6 hours as needed for Wheezing  metFORMIN (GLUCOPHAGE) 1000 MG tablet, Take 1,000 mg by mouth 2 times daily (with meals)  HYDROcodone-acetaminophen (NORCO)  MG per tablet, Take 1 tablet by mouth 2 times daily as needed for Pain  HYDROcodone-acetaminophen (NORCO)  MG per tablet, Take 1 tablet by mouth 3 times daily as needed for Pain  HYDROcodone-acetaminophen (NORCO)  MG per tablet, Take 1 tablet by mouth every 8 hours as needed for Pain    Current Hospital Medications:     Scheduled Meds:   ipratropium-albuterol  1 ampule Inhalation TID    insulin lispro  0-12 Units Subcutaneous TID WC    insulin lispro  0-6 Units Subcutaneous Nightly    guaiFENesin  600 mg Oral BID    water, bacteriostatic        sodium chloride flush  10 mL Intravenous 2 times per day    enoxaparin  40 mg Subcutaneous Daily    aspirin  81 mg Oral Daily    atorvastatin  40 mg Oral Nightly     Continuous Infusions:   dextrose       PRN Meds:.albuterol, glucose, dextrose, glucagon (rDNA), dextrose, sodium chloride flush, magnesium hydroxide, ondansetron, labetalol  .  dextrose          Allergies:     No Known Allergies     Review of Systems:       Constitutional: Patient denies fever, chills, night sweats, weight loss, or change an appetite. Eyes: No vision change, eye pain, or double vision. Ears, nose, mouth, throat: No change in the hearing, or sinus congestion. Cardiovascular: Denies orthopnea, palpitations, or chest pain. Respiratory: No cough or exertional dyspnea. GI: No heartburn, nausea, vomiting, diarrhea, or constipation. : Theres no loss of control of bowel or bladder, no urinary urgency, no urinary retention. MSK: No arthralgia or myalgia. Skin: Denies rashes. Neurological problems as discussed above. Psychiatric: No depression, anxiety, hallucinations. No suicidal ideation. Hematologic: No mucosal bleeding or easy bruising. Objective Findings:     Vitals:BP (!) 142/60   Pulse 86   Temp 98 °F (36.7 °C) (Oral)   Resp 18   Ht 5' 3\" (1.6 m)   Wt 195 lb (88.5 kg)   LMP  (LMP Unknown)   SpO2 99%   BMI 34.54 kg/m²      Physical Examination:  GENERAL APPEARANCE: No distress, alert, interactive and cooperative. RESP: Bilateral inspiratory wheezes   CARDIOVASCULAR: Regular, rate and rhythm, no murmurs, normal S1 and S2.  Carotid pulses intact without any bruits. MENTAL STATE: Orientation was normal to time, place and person. Recent and remote memory was intact. Attention span and concentration were normal. Language testing was normal for comprehension, repetition, expression, and naming, correctly interpreted \"if the tiger is chased by the lion, who is in front? \" General fund of knowledge was intact. CRANIAL NERVES:   CN 2 Visual fields full to confrontation. CN 3, 4, 6 Pupils round, equally reactive to light. No ptosis. EOMs normal alignment, full range with normal saccades, pursuit and convergence. No nystagmus. CN 5 Facial sensation intact bilaterally. CN 7 Normal and symmetric facial strength. Nasolabial folds symmetric. CN 8 Hearing intact to finger rub bilaterally. CN 9 Palate elevates symmetrically. CN 11 Bilaterally normal strength of shoulder shrug and neck turning. CN 12 Tongue midline, with normal bulk and strength; no fasciculations. MOTOR: Motor exam was normal. Muscle bulk and tone were normal in both upper and lower extremities. Muscle strength was 5/5 in distal and proximal muscles in both upper and lower extremities however right lower is 5-/5. No fasciculations, tremor or other abnormal movements were present. REFLEXES:   RIGHT UE LEFT UE   BR:2 BR:2   Biceps:2 Biceps:2   Triceps:2 Triceps:2   RIGHT LLE LEFT LLE   Patella:2 Patella:2   Achilles:2 Achilles:2   Babinski: toes downgoing to plantar stimulation bilaterally. No frontal release signs or other pathologic reflexes present. SENSORY: Sensory exam was normal. In both upper and lower extremities, sensation was intact to light touch; sharp/dull, vibration and joint position sense. COORDINATION: Coordination exam was normal. In both upper extremities, finger-nose-finger was intact without dysmetria. No dysdiadochokinesia. In both lower extremities, heel-to-shin was intact. GAIT: Gait was normal. Normal base, arm swing and speed.  Normal posture. No ataxia. Tandem gait was intact. No Romberg sign was present. Postural reflexes were normal.    Results/ Medications reviewed 5/18/2019, 7:07 PM     Laboratory, Microbiology, Pathology, Radiology, Cardiology, Medications and Transcriptions reviewed  Scheduled Meds:   ipratropium-albuterol  1 ampule Inhalation TID    insulin lispro  0-12 Units Subcutaneous TID WC    insulin lispro  0-6 Units Subcutaneous Nightly    guaiFENesin  600 mg Oral BID    water, bacteriostatic        sodium chloride flush  10 mL Intravenous 2 times per day    enoxaparin  40 mg Subcutaneous Daily    aspirin  81 mg Oral Daily    atorvastatin  40 mg Oral Nightly     Continuous Infusions:   dextrose         Recent Labs     05/17/19 1913 05/18/19  0601   WBC 9.3 8.2   HGB 13.9 12.5   HCT 40.9 38.1   MCV 88.5 87.7    231     Recent Labs     05/17/19 1913 05/18/19  0601    138   K 4.3 3.6   CL 99 101   CO2 22 24   BUN 19 12   CREATININE 0.68 0.51     Recent Labs     05/17/19 1913   AST 15   ALT 12   BILITOT 0.3   ALKPHOS 89     No results for input(s): LIPASE, AMYLASE in the last 72 hours. Recent Labs     05/17/19 1913   PROT 7.2     Cta Head W Wo Contrast    Result Date: 5/18/2019  CTA head with intravenous contrast medium. HISTORY: Inability to use left leg. Technical factors: CTA head imaging formatted as contiguous axial images through skull base. Sagittal, coronal, right and left oblique, 3-D MIP obtained during postprocessing. Intravenous contrast medium consisting of Isovue-370, 100 mL utilized. Study done in conjunction with CTA neck reported separately. No prior CT head available for comparison. FINDINGS: Bilateral A1 and A2 segments, anterior carotid arteries patent. Anterior communicating artery patent. Communicating segments bilateral internal carotid arteries Bilateral M1 and M2 segments middle cerebral arteries patent. Congenital absence, bilateral communicating arteries.  Bilateral P1, P2 segments, posterior cerebral arteries patent. Basilar tip and basilar artery patent. No aneurysm, or obstruction identified. Negative CTA head. All CT scans at this facility use dose modulation, iterative reconstruction, and/or weight based dosing when appropriate to reduce radiation dose to as low as reasonably achievable. Xr Chest Standard (2 Vw)    Result Date: 5/18/2019  EXAMINATION: XR CHEST (2 VW) CLINICAL HISTORY: COUGH COMPARISONS: May 17, 2019. FINDINGS: Osseous structures intact. Cardiopericardial silhouette normal. Pulmonary vasculature normal. Lungs clear. No acute cardiopulmonary disease. Ct Head Wo Contrast    Result Date: 5/17/2019  CT HEAD WO CONTRAST : 5/17/2019 CLINICAL HISTORY:  Loss of use of right leg . COMPARISON: Head MRI 6/22/2011. TECHNIQUE: Spiral unenhanced images were obtained of the head, with routine reconstructions performed. All CT scans at this facility use dose modulation, iterative reconstruction, and/or weight based dosing when appropriate to reduce radiation dose to as low as reasonably achievable. FINDINGS: There is no intracranial hemorrhage, mass effect, midline shift, extra-axial collection, evidence of hydrocephalus, recent ischemic infarct, or skull fracture identified. Mild to moderate atrophic changes have not significantly changed from 6/22/2011. The mastoid air cells and visualized paranasal sinuses are essentially clear. NO ACUTE INTRACRANIAL PROCESS IDENTIFIED. Cta Neck W Wo Contrast    Result Date: 5/18/2019  CTA neck with intravenous contrast medium. HISTORY: Unable to move right leg. Technical factors: CTA neck obtained and formatted as contiguous axial images from aortic arch to skull base. Sagittal, coronal, overlap, right and left anterior oblique, and 3-D MIP reconstruction obtained during postprocessing. Intravenous contrast medium, consisting of Isovue-370, 100 mL identified.  Study is done in conjunction with CTA head, reported separately. No prior CTA neck images available for comparison. FINDINGS: Right carotid: Right common carotid artery arises from brachiocephalic trunk. Ostium patent. Course and caliber right common carotid artery without anomaly. Carotid bifurcation without anomaly. Cervical segment patent but tortuous. Petrous, lacerum, clinoid, ophthalmic, cavernous, and communicating segments patent. Left carotid: Left common carotid artery arises from aortic arch. Ostium patent. Course and caliber left common carotid artery without anomaly. Calcification identified carotid bulb yielding 60 % stenosis by NASCET criteria. Remainder cervical segment, as well as petrous, lacerum, clinoid, ophthalmic, cavernous, and communicating segments patent. Right vertebral: Right vertebral artery arises from right subclavian artery. Pre foraminal, foraminal, extradural, and intradural segments patent. Left vertebral: Left vertebral artery arises from left subclavian artery. Pre foraminal, foraminal, extradural, and intradural segments patent. 60 % stenosis left internal carotid artery by NASCET criteria. No obstruction or dissection identified. All CT scans at this facility use dose modulation, iterative reconstruction, and/or weight based dosing when appropriate to reduce radiation dose to as low as reasonably achievable. Xr Chest Portable    Result Date: 5/18/2019  EXAMINATION: XR CHEST PORTABLE CLINICAL HISTORY:   LOSS OF USE OF RIGHT LEG COMPARISONS: NOVEMBER 24, 2013 FINDINGS: Osseous structures intact. Cardiopericardial silhouette normal. Pulmonary vasculature normal. Lungs clear. NO ACUTE CARDIOPULMONARY DISEASE. Mri Brain Wo Contrast    Result Date: 5/18/2019  EXAMINATION:  MRI BRAIN WITHOUT IV CONTRAST CLINICAL HISTORY:  RIGHT LOWER EXTREMITY WEAKNESS, EVALUATE FOR POSSIBLE TIA OR CVA COMPARISON:  5/17/2019 CT BRAIN TECHNIQUE:  Multisequence and multiplane MRI brain is obtained without IV gadolinium.  FINDINGS:  There is 81 cm/s Mid internal carotid artery                      69 cm/s           89 cm/s Distal internal carotid artery                  67 cm/s             71 cm/s External carotid artery                            97 cm/s           80 cm/s    ICA/CCA ratio                                         1.0                1.2   Vertebral arteries antegrade flow         Yes                     Yes      1. RIGHT INTERNAL CAROTID ARTERY: <50% STENOSIS. 2. LEFT INTERNAL CAROTID ARTERY: <50% STENOSIS. 3. MODERATE CAROTID PLAQUE BURDEN IN LEFT CAROTID BIFURCATION IN THE NECK. 4. MILD CAROTID PLAQUE BURDEN IN RIGHT CAROTID BIFURCATION IN THE NECK. 5. BILATERALLY PATENT VERTEBRAL ARTERIES WITH ANTEGRADE BLOOD FLOW. Validated velocity measurements with angiographic measurements, velocity criteria are extrapolated from diameter data as defined by the Society of Radiologist in [address] [place] [address] Drive Radiology 2003; [phone]. Impression:     TIA, TTE shows intra-atrial septal aneurysm. No stroke on the MRI however patient feels some residual weakness, perhaps it was a punctate area that was missed    Plan:     - needs LEONID, cardiology consult, will need eval for thrombus in atrial appendage. Will need outpatient 30 day event monitor  - TIA work up is almost completed, with MRI brain, CTA of the head and neck and carotid dopplers  - continue ASA    Comments: Thank you for allowing us to participate in the care of this patient. Will follow    Please call if questions or concerns arise.     Electronically signed by Cherie Hernandez MD on 5/18/2019 at 7:07 PM
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
## 2019-05-19 LAB
ANION GAP SERPL CALCULATED.3IONS-SCNC: 11 MEQ/L (ref 9–15)
BUN BLDV-MCNC: 11 MG/DL (ref 8–23)
CALCIUM SERPL-MCNC: 9.3 MG/DL (ref 8.5–9.9)
CHLORIDE BLD-SCNC: 104 MEQ/L (ref 95–107)
CO2: 25 MEQ/L (ref 20–31)
CREAT SERPL-MCNC: 0.5 MG/DL (ref 0.5–0.9)
GFR AFRICAN AMERICAN: >60
GFR NON-AFRICAN AMERICAN: >60
GLUCOSE BLD-MCNC: 206 MG/DL (ref 60–115)
GLUCOSE BLD-MCNC: 215 MG/DL (ref 70–99)
GLUCOSE BLD-MCNC: 230 MG/DL (ref 60–115)
GLUCOSE BLD-MCNC: 328 MG/DL (ref 60–115)
GLUCOSE BLD-MCNC: 379 MG/DL (ref 60–115)
HCT VFR BLD CALC: 39.3 % (ref 37–47)
HEMOGLOBIN: 12.9 G/DL (ref 12–16)
MCH RBC QN AUTO: 29.1 PG (ref 27–31.3)
MCHC RBC AUTO-ENTMCNC: 32.7 % (ref 33–37)
MCV RBC AUTO: 89.2 FL (ref 82–100)
PDW BLD-RTO: 13.8 % (ref 11.5–14.5)
PERFORMED ON: ABNORMAL
PLATELET # BLD: 230 K/UL (ref 130–400)
POTASSIUM SERPL-SCNC: 3.8 MEQ/L (ref 3.4–4.9)
RBC # BLD: 4.41 M/UL (ref 4.2–5.4)
SODIUM BLD-SCNC: 140 MEQ/L (ref 135–144)
WBC # BLD: 6.9 K/UL (ref 4.8–10.8)

## 2019-05-19 PROCEDURE — 92610 EVALUATE SWALLOWING FUNCTION: CPT

## 2019-05-19 PROCEDURE — 85027 COMPLETE CBC AUTOMATED: CPT

## 2019-05-19 PROCEDURE — 94640 AIRWAY INHALATION TREATMENT: CPT

## 2019-05-19 PROCEDURE — 36415 COLL VENOUS BLD VENIPUNCTURE: CPT

## 2019-05-19 PROCEDURE — 94760 N-INVAS EAR/PLS OXIMETRY 1: CPT

## 2019-05-19 PROCEDURE — 6370000000 HC RX 637 (ALT 250 FOR IP): Performed by: INTERNAL MEDICINE

## 2019-05-19 PROCEDURE — 6360000002 HC RX W HCPCS: Performed by: INTERNAL MEDICINE

## 2019-05-19 PROCEDURE — 80048 BASIC METABOLIC PNL TOTAL CA: CPT

## 2019-05-19 PROCEDURE — 2580000003 HC RX 258: Performed by: INTERNAL MEDICINE

## 2019-05-19 PROCEDURE — 1210000000 HC MED SURG R&B

## 2019-05-19 PROCEDURE — 99222 1ST HOSP IP/OBS MODERATE 55: CPT | Performed by: INTERNAL MEDICINE

## 2019-05-19 PROCEDURE — 2700000000 HC OXYGEN THERAPY PER DAY

## 2019-05-19 RX ORDER — PREDNISONE 20 MG/1
40 TABLET ORAL ONCE
Status: COMPLETED | OUTPATIENT
Start: 2019-05-19 | End: 2019-05-19

## 2019-05-19 RX ORDER — ALBUTEROL SULFATE 2.5 MG/3ML
2.5 SOLUTION RESPIRATORY (INHALATION)
Status: DISCONTINUED | OUTPATIENT
Start: 2019-05-19 | End: 2019-05-25

## 2019-05-19 RX ORDER — IPRATROPIUM BROMIDE AND ALBUTEROL SULFATE 2.5; .5 MG/3ML; MG/3ML
1 SOLUTION RESPIRATORY (INHALATION) 4 TIMES DAILY
Status: DISCONTINUED | OUTPATIENT
Start: 2019-05-19 | End: 2019-05-22

## 2019-05-19 RX ORDER — LISINOPRIL 5 MG/1
5 TABLET ORAL DAILY
Status: DISCONTINUED | OUTPATIENT
Start: 2019-05-19 | End: 2019-05-28 | Stop reason: HOSPADM

## 2019-05-19 RX ADMIN — PREDNISONE 40 MG: 20 TABLET ORAL at 12:07

## 2019-05-19 RX ADMIN — ASPIRIN 81 MG: 81 TABLET, COATED ORAL at 09:08

## 2019-05-19 RX ADMIN — INSULIN LISPRO 4 UNITS: 100 INJECTION, SOLUTION INTRAVENOUS; SUBCUTANEOUS at 09:09

## 2019-05-19 RX ADMIN — IPRATROPIUM BROMIDE AND ALBUTEROL SULFATE 1 AMPULE: .5; 3 SOLUTION RESPIRATORY (INHALATION) at 07:13

## 2019-05-19 RX ADMIN — GUAIFENESIN 600 MG: 600 TABLET, EXTENDED RELEASE ORAL at 09:08

## 2019-05-19 RX ADMIN — IPRATROPIUM BROMIDE AND ALBUTEROL SULFATE 1 AMPULE: .5; 3 SOLUTION RESPIRATORY (INHALATION) at 11:15

## 2019-05-19 RX ADMIN — ALBUTEROL SULFATE 2.5 MG: 2.5 SOLUTION RESPIRATORY (INHALATION) at 02:31

## 2019-05-19 RX ADMIN — INSULIN LISPRO 10 UNITS: 100 INJECTION, SOLUTION INTRAVENOUS; SUBCUTANEOUS at 12:09

## 2019-05-19 RX ADMIN — GUAIFENESIN 600 MG: 600 TABLET, EXTENDED RELEASE ORAL at 21:06

## 2019-05-19 RX ADMIN — IPRATROPIUM BROMIDE AND ALBUTEROL SULFATE 1 AMPULE: .5; 3 SOLUTION RESPIRATORY (INHALATION) at 19:55

## 2019-05-19 RX ADMIN — Medication 10 ML: at 21:04

## 2019-05-19 RX ADMIN — Medication 10 ML: at 09:08

## 2019-05-19 RX ADMIN — ATORVASTATIN CALCIUM 40 MG: 40 TABLET, FILM COATED ORAL at 21:06

## 2019-05-19 RX ADMIN — INSULIN LISPRO 4 UNITS: 100 INJECTION, SOLUTION INTRAVENOUS; SUBCUTANEOUS at 17:10

## 2019-05-19 RX ADMIN — ENOXAPARIN SODIUM 40 MG: 40 INJECTION SUBCUTANEOUS at 09:08

## 2019-05-19 RX ADMIN — LISINOPRIL 5 MG: 5 TABLET ORAL at 17:10

## 2019-05-19 RX ADMIN — IPRATROPIUM BROMIDE AND ALBUTEROL SULFATE 1 AMPULE: .5; 3 SOLUTION RESPIRATORY (INHALATION) at 15:28

## 2019-05-19 RX ADMIN — CEFTRIAXONE SODIUM 1 G: 1 INJECTION, POWDER, FOR SOLUTION INTRAMUSCULAR; INTRAVENOUS at 15:35

## 2019-05-19 ASSESSMENT — ENCOUNTER SYMPTOMS
VOMITING: 0
SHORTNESS OF BREATH: 0
EYES NEGATIVE: 1
WHEEZING: 0
GASTROINTESTINAL NEGATIVE: 1
NAUSEA: 0
RESPIRATORY NEGATIVE: 1
ABDOMINAL PAIN: 0
ALLERGIC/IMMUNOLOGIC NEGATIVE: 1

## 2019-05-19 NOTE — PROGRESS NOTES
Writer has assisted patient with restroom several times every hour. She urinates each time. Patient states that she does this at home and it has been this way for a while. A bed side commode has been placed next to bed because when she needs to use the restroom it is urgent and is able to get self to and from commode. Patient goes approximately 4-6 times an hour. RN is aware.

## 2019-05-19 NOTE — PROGRESS NOTES
Facility/Department: [address] NEURO   CLINICAL BEDSIDE SWALLOW EVALUATION    NAME: Sarita Haji  : 1950  MRN: [id number]    ADMISSION DATE: 2019  ADMITTING DIAGNOSIS: has Lumbosacral spondylosis without myelopathy; Cervical spondylosis without myelopathy; and TIA (transient ischemic attack) on their problem list.    ONSET DATE: 2019    Date of Eval: 2019  Evaluating Therapist: Helen Lugo    Recommended Diet and Intervention  Diet Solids Recommendation: Regular  Liquid Consistency Recommendation: Thin  Recommended Form of Meds: Whole with water     Reason for Referral  Sarita Haji was referred for a bedside swallow evaluation to assess the efficiency of her swallow function, identify signs and symptoms of aspiration and make recommendations regarding safe dietary consistencies, effective compensatory strategies, and safe eating environment. General  Chart Reviewed: Yes  Subjective  Subjective: Pt sitting on side of bed, tearful upon arrival, heavy breathing (nursing notified). Behavior/Cognition: Alert; Cooperative;Pleasant mood  Respiratory Status: O2 via nasual cannula  O2 Device: Nasal cannula  Liters of Oxygen: 2 L  Communication Observation: Functional  Follows Directions: Complex  Dentition: Adequate  Patient Positioning: Upright in bed(Side of bed)  Baseline Vocal Quality: Normal  Volitional Cough: Strong  Prior Dysphagia History: No prior hx of dysphagia. Consistencies Administered: Reg solid; Thin - cup; Thin - straw    Current Diet level:  Current Diet : Regular  Current Liquid Diet : Thin    Oral Motor Deficits  Oral/Motor  Oral Motor: Within functional limits    Oral Phase Dysfunction  Oral Phase  Oral Phase: WFL     Indicators of Pharyngeal Phase Dysfunction   Pharyngeal Phase  Pharyngeal Phase: WFL    Impression  Dysphagia Diagnosis: Swallow function appears grossly intact.   Pt appears to demonstrate functional mastication for regular textures, good bolus cohesion with timely A-P transit. Pt within functional limits for swallow onset with no overt s/s of aspiration noted. Dysphagia Outcome Severity Scale: Level 7: Normal in all situations     Treatment Plan  Requires SLP Intervention: No     Prognosis  Individuals consulted  Consulted and agree with results and recommendations: RN;Patient(NEY Lisa)    Education  Patient Education: ST provided education to patient regarding results of BSE, risk for aspiration with SOB and posture, and identification of overt s/s of aspiration.   Patient Education Response: Verbalizes understanding  Safety Devices in place: Yes  Type of devices: Call light within reach    Pain:  Pain Assessment  Patient Currently in Pain: Randy Zimmer MA, CCC-SLP, Date: 5/19/2019, Time: 11:11 AM

## 2019-05-19 NOTE — CARE COORDINATION
Patient has an IAS aneurysm and needs to have a LEONID. She remains on O2 2L. We discussed the SNF list I gave her yesterday and she would like 1. [facility], 2. [facility]. She is aware we will need precert from insurance. Medicare IMM reviewed with patient. She verbalized understanding and signature obtained.   Electronically signed by Mercedes Ceballos RN on 5/19/19 at 11:37 AM

## 2019-05-19 NOTE — PROGRESS NOTES
Hospitalist Progress Note      PCP: Ana Mcpherson MD    Date of Admission: 5/17/2019    Chief Complaint:  No acute events overnight,afebrile,stable HD,feels better    Medications:  Reviewed    Infusion Medications    dextrose       Scheduled Medications    ipratropium-albuterol  1 ampule Inhalation TID    insulin lispro  0-12 Units Subcutaneous TID WC    insulin lispro  0-6 Units Subcutaneous Nightly    guaiFENesin  600 mg Oral BID    sodium chloride flush  10 mL Intravenous 2 times per day    enoxaparin  40 mg Subcutaneous Daily    aspirin  81 mg Oral Daily    atorvastatin  40 mg Oral Nightly     PRN Meds: albuterol, glucose, dextrose, glucagon (rDNA), dextrose, sodium chloride flush, magnesium hydroxide, ondansetron, labetalol      Intake/Output Summary (Last 24 hours) at 5/19/2019 1000  Last data filed at 5/18/2019 2037  Gross per 24 hour   Intake 10 ml   Output --   Net 10 ml       Exam:    /79   Pulse 107   Temp 98.2 °F (36.8 °C) (Oral)   Resp 16   Ht 5' 3\" (1.6 m)   Wt 195 lb (88.5 kg)   LMP  (LMP Unknown)   SpO2 95%   BMI 34.54 kg/m²     General appearance: No apparent distress, appears stated age and cooperative. Respiratory:  Clear to auscultation, bilaterally without Rales/Wheezes/Rhonchi. Cardiovascular: Regular rate and rhythm with normal S1/S2 . Abdomen: Soft, non-tender, active bowel sounds. Musculoskeletal: No cyanosis or edema bilaterally.    Neuro: mild left sided facial droop, RLE with mild drift     Labs:   Recent Labs     05/17/19 1913 05/18/19  0601 05/19/19  0613   WBC 9.3 8.2 6.9   HGB 13.9 12.5 12.9   HCT 40.9 38.1 39.3    231 230     Recent Labs     05/17/19 1913 05/18/19  0601 05/19/19  0613    138 140   K 4.3 3.6 3.8   CL 99 101 104   CO2 22 24 25   BUN 19 12 11   CREATININE 0.68 0.51 0.50   CALCIUM 9.3 8.8 9.3     Recent Labs     05/17/19 1913   AST 15   ALT 12   BILITOT 0.3   ALKPHOS 89     No results for input(s): INR in the last 72 NASCET criteria. No obstruction or dissection identified. All CT scans at this facility use dose modulation, iterative reconstruction, and/or weight based dosing when appropriate to reduce radiation dose to as low as reasonably achievable. CT Head WO Contrast   Final Result      NO ACUTE INTRACRANIAL PROCESS IDENTIFIED. XR CHEST PORTABLE   Final Result   NO ACUTE CARDIOPULMONARY DISEASE.               Assessment/Plan:    79 y.o. female with a history of HTN, DLD, DMII, Asthma presenting from home with:    RLE weakness  - concerning for TIA/CVA  - CT and CTA head was negative  - CTA of the neck showed 60% stenosis of Left ICA  - continue ASA, statin  - MRI negative for CVA  - TTE showed interatrial septum aneurysm  - plan for LEONID tomorrow per cardiology  - neurology following    UTI  - urine culture positive for GNB, continue rocephin    NIDDM2 with hyperglycemia  - uncontrolled, HbA1c was 10  - SSI med dose    Asthma   - continue duonebs       Diet: DIET CARDIAC;    Code Status: Full Code            Electronically signed by Michel Garcia MD on 5/19/2019 at 10:00 AM

## 2019-05-19 NOTE — CONSULTS
Chief Complaint   Patient presents with    Extremity Weakness     right leg weakness, woke up from a nap at 1730 today and right leg is weak        Patient is a 79 y.o. female who presents with a chief complaint of right LE weakness. Patient is followed on a regular basis by Dr. Yuliana Donald MD. Thought to TIA/CVA type symptoms. S/p TTE with normal LVF, IAS aneurysm and tech difficult bubble study. We are asked to perform LEONID by neuro. + hx of DM, HTN and HLP. No prevous cardiac hx. Pt denies chest pain, dyspnea, dyspnea on exertion, change in exercise capacity, fatigue,  nausea, vomiting, diarrhea, constipation, motor weakness, insomnia, weight loss, syncope, dizziness, lightheadedness, palpitations, PND, orthopnea, or claudication.       Past Medical History:   Diagnosis Date    Asthma     Bladder infection     Chicken pox     Chronic back pain     Hypertension     Measles     Mumps     Osteoarthritis     Pneumonia     Whooping cough       Patient Active Problem List   Diagnosis    Lumbosacral spondylosis without myelopathy    Cervical spondylosis without myelopathy    TIA (transient ischemic attack)       Past Surgical History:   Procedure Laterality Date    APPENDECTOMY      HYSTERECTOMY      TONSILLECTOMY         Social History     Socioeconomic History    Marital status:      Spouse name: None    Number of children: None    Years of education: None    Highest education level: None   Occupational History    None   Social Needs    Financial resource strain: None    Food insecurity:     Worry: None     Inability: None    Transportation needs:     Medical: None     Non-medical: None   Tobacco Use    Smoking status: Former Smoker     Last attempt to quit: 10/19/2009     Years since quittin.5    Smokeless tobacco: Never Used   Substance and Sexual Activity    Alcohol use: No     Comment: recovering alcoholic    Drug use: Never    Sexual activity: None Lifestyle    Physical activity:     Days per week: None     Minutes per session: None    Stress: None   Relationships    Social connections:     Talks on phone: None     Gets together: None     Attends Mu-ism service: None     Active member of club or organization: None     Attends meetings of clubs or organizations: None     Relationship status: None    Intimate partner violence:     Fear of current or ex partner: None     Emotionally abused: None     Physically abused: None     Forced sexual activity: None   Other Topics Concern    None   Social History Narrative    None       Family History   Problem Relation Age of Onset    Cancer Mother     Cancer Father        Current Facility-Administered Medications   Medication Dose Route Frequency Provider Last Rate Last Dose    albuterol (PROVENTIL) nebulizer solution 2.5 mg  2.5 mg Nebulization Q2H PRN Theodoro Mallet Sedar, DO   2.5 mg at 05/19/19 0231    predniSONE (DELTASONE) tablet 40 mg  40 mg Oral Once Quincy Lisa MD        ipratropium-albuterol (DUONEB) nebulizer solution 1 ampule  1 ampule Inhalation 4x daily Quincy Lisa MD   1 ampule at 05/19/19 1115    glucose (GLUTOSE) 40 % oral gel 15 g  15 g Oral PRN Theodoro Mallet Sedar, DO        dextrose 50 % solution 12.5 g  12.5 g Intravenous PRN Theodoro Mallet Sedar, DO        glucagon (rDNA) injection 1 mg  1 mg Intramuscular PRN Theodoro Mallet Sedar, DO        dextrose 5 % solution  100 mL/hr Intravenous PRN Theodoro Mallet Sedar, DO        insulin lispro (HUMALOG) injection vial 0-12 Units  0-12 Units Subcutaneous TID  Getachew D Sedar, DO   4 Units at 05/19/19 0909    insulin lispro (HUMALOG) injection vial 0-6 Units  0-6 Units Subcutaneous Nightly Theodoro Mallet Sedar, DO   2 Units at 05/18/19 2209    guaiFENesin (MUCINEX) extended release tablet 600 mg  600 mg Oral BID Theodoro Mallet Sedar, DO   600 mg at 05/19/19 0908    sodium chloride flush 0.9 % injection 10 mL  10 mL Intravenous 2 times per day On\[facility] [place] [facility]\"" David RAGLAND Sedar, DO   10 mL at 05/19/19 0908  sodium chloride flush 0.9 % injection 10 mL  10 mL Intravenous PRN Awanda Muscat Sedar, DO        magnesium hydroxide (MILK OF MAGNESIA) 400 MG/5ML suspension 30 mL  30 mL Oral Daily PRN Awanda Muscat Sedar, DO        ondansetron (ZOFRAN) injection 4 mg  4 mg Intravenous Q6H PRN Awanda Muscat Sedar, DO        enoxaparin (LOVENOX) injection 40 mg  40 mg Subcutaneous Daily Anai Kilpatrick D Sedar, DO   40 mg at 05/19/19 0908    labetalol (NORMODYNE;TRANDATE) injection syringe 10 mg  10 mg Intravenous Q10 Min PRN Awanda Muscat Sedar, DO        aspirin EC tablet 81 mg  81 mg Oral Daily Awanda Muscat Sedar, DO   81 mg at 05/19/19 0908    atorvastatin (LIPITOR) tablet 40 mg  40 mg Oral Nightly Getachew D Sedar, DO   40 mg at 05/18/19 2037       ALLERGIES: Patient has no known allergies. Review of Systems   Constitutional: Negative. Negative for chills and fever. HENT: Negative. Eyes: Negative. Respiratory: Negative. Negative for shortness of breath and wheezing. Cardiovascular: Negative for chest pain, palpitations and leg swelling. Gastrointestinal: Negative. Negative for abdominal pain, nausea and vomiting. Endocrine: Negative. Genitourinary: Negative. Musculoskeletal: Negative. Skin: Negative. Negative for rash. Allergic/Immunologic: Negative. Neurological: Positive for weakness (right LE). Negative for dizziness and headaches. Psychiatric/Behavioral: Negative. VITALS:  Blood pressure 127/79, pulse 107, temperature 98.2 °F (36.8 °C), temperature source Oral, resp. rate 16, height 5' 3\" (1.6 m), weight 195 lb (88.5 kg), SpO2 95 %. Body mass index is 34.54 kg/m². Physical Exam   Constitutional: She is oriented to person, place, and time. She appears well-developed and well-nourished. HENT:   Head: Normocephalic and atraumatic. Eyes: Pupils are equal, round, and reactive to light. Neck: Normal range of motion. Neck supple. No JVD present. No tracheal deviation present. No thyromegaly present. Cardiovascular: Normal rate, regular rhythm, normal heart sounds and intact distal pulses. PMI is not displaced. Exam reveals no gallop, no S3, no distant heart sounds and no friction rub. No murmur heard. Pulmonary/Chest: No respiratory distress. She has no wheezes. She has no rales. She exhibits no tenderness. Abdominal: Soft. Bowel sounds are normal. She exhibits no distension and no mass. There is no tenderness. There is no rebound and no guarding. Musculoskeletal: She exhibits no edema. Neurological: She is alert and oriented to person, place, and time. No cranial nerve deficit. Skin: Skin is warm and dry. No rash noted. She is not diaphoretic. No erythema. No pallor. Psychiatric: She has a normal mood and affect.  Her behavior is normal. Judgment and thought content normal.       LABS:  Recent Results (from the past 24 hour(s))   POCT Glucose    Collection Time: 05/18/19 11:53 AM   Result Value Ref Range    POC Glucose 267 (H) 60 - 115 mg/dl    Performed on ACCU-CHEK    Hemoglobin A1c    Collection Time: 05/18/19 11:54 AM   Result Value Ref Range    Hemoglobin A1C 10.0 (H) 4.8 - 5.9 %   Troponin    Collection Time: 05/18/19 11:54 AM   Result Value Ref Range    Troponin <0.010 0.000 - 0.010 ng/mL   POCT Glucose    Collection Time: 05/18/19  4:35 PM   Result Value Ref Range    POC Glucose 152 (H) 60 - 115 mg/dl    Performed on ACCU-CHEK    POCT Glucose    Collection Time: 05/18/19  8:45 PM   Result Value Ref Range    POC Glucose 247 (H) 60 - 115 mg/dl    Performed on ACCU-CHEK    POCT Glucose    Collection Time: 05/19/19  6:12 AM   Result Value Ref Range    POC Glucose 206 (H) 60 - 115 mg/dl    Performed on ACCU-CHEK    Basic Metabolic Panel    Collection Time: 05/19/19  6:13 AM   Result Value Ref Range    Sodium 140 135 - 144 mEq/L    Potassium 3.8 3.4 - 4.9 mEq/L    Chloride 104 95 - 107 mEq/L    CO2 25 20 - 31 mEq/L    Anion Gap 11 9 - 15 mEq/L    Glucose 215 (H) 70 - 99 mg/dL    BUN 11 8 - 23 mg/dL    CREATININE 0.50 0.50 - 0.90 mg/dL    GFR Non-African American >60.0 >60    GFR  >60.0 >60    Calcium 9.3 8.5 - 9.9 mg/dL   CBC    Collection Time: 05/19/19  6:13 AM   Result Value Ref Range    WBC 6.9 4.8 - 10.8 K/uL    RBC 4.41 4.20 - 5.40 M/uL    Hemoglobin 12.9 12.0 - 16.0 g/dL    Hematocrit 39.3 37.0 - 47.0 %    MCV 89.2 82.0 - 100.0 fL    MCH 29.1 27.0 - 31.3 pg    MCHC 32.7 (L) 33.0 - 37.0 %    RDW 13.8 11.5 - 14.5 %    Platelets 332 247 - 260 K/uL     Troponin:   Lab Results   Component Value Date    TROPONINI <0.010 05/18/2019       EKG: normal sinus rhythm, nonspecific ST and T waves changes      ASSESSMENT:    TIA/CVA  IAS aneurysm  ? PFO  Hx of DM  HTN  HLP  Morbid obesity      PLAN:   1. As always, aggressive risk factor modification is strongly recommended. We should adhere to the JNC VIII guidelines for HTN management and the NCEPATP III guidelines for LDL-C management. 2. LEONID tomorrow for evaluation of IAS and possible PFO  3. Max cardiac meds  4. Coronary evaluation as outpatient given risk factors  5. ? Event monitor as outpatient for afib detection  6. Monitor on tele  7. GI/DVT proph    Thank you for allowing me to participate in the care of your patient, please don't hesitate to contact me if you have any further questions.     Electronically signed by Percy Baumann DO on 5/19/2019 at 11:48 AM

## 2019-05-20 ENCOUNTER — APPOINTMENT (OUTPATIENT)
Dept: CARDIAC CATH/INVASIVE PROCEDURES | Age: 69
DRG: 191 | End: 2019-05-20
Payer: MEDICARE

## 2019-05-20 LAB
ANION GAP SERPL CALCULATED.3IONS-SCNC: 12 MEQ/L (ref 9–15)
BUN BLDV-MCNC: 13 MG/DL (ref 8–23)
CALCIUM SERPL-MCNC: 9.7 MG/DL (ref 8.5–9.9)
CHLORIDE BLD-SCNC: 103 MEQ/L (ref 95–107)
CO2: 25 MEQ/L (ref 20–31)
CREAT SERPL-MCNC: 0.49 MG/DL (ref 0.5–0.9)
GFR AFRICAN AMERICAN: >60
GFR NON-AFRICAN AMERICAN: >60
GLUCOSE BLD-MCNC: 162 MG/DL (ref 60–115)
GLUCOSE BLD-MCNC: 212 MG/DL (ref 60–115)
GLUCOSE BLD-MCNC: 216 MG/DL (ref 60–115)
GLUCOSE BLD-MCNC: 264 MG/DL (ref 70–99)
GLUCOSE BLD-MCNC: 310 MG/DL (ref 60–115)
HCT VFR BLD CALC: 39.1 % (ref 37–47)
HEMOGLOBIN: 12.9 G/DL (ref 12–16)
INR BLD: 1
LV EF: 55 %
LVEF MODALITY: NORMAL
MCH RBC QN AUTO: 29.4 PG (ref 27–31.3)
MCHC RBC AUTO-ENTMCNC: 32.9 % (ref 33–37)
MCV RBC AUTO: 89.4 FL (ref 82–100)
ORGANISM: ABNORMAL
PDW BLD-RTO: 13.8 % (ref 11.5–14.5)
PERFORMED ON: ABNORMAL
PLATELET # BLD: 264 K/UL (ref 130–400)
POTASSIUM SERPL-SCNC: 3.9 MEQ/L (ref 3.4–4.9)
PROTHROMBIN TIME: 10 SEC (ref 9–11.5)
RBC # BLD: 4.38 M/UL (ref 4.2–5.4)
SODIUM BLD-SCNC: 140 MEQ/L (ref 135–144)
URINE CULTURE, ROUTINE: ABNORMAL
URINE CULTURE, ROUTINE: ABNORMAL
WBC # BLD: 8.6 K/UL (ref 4.8–10.8)

## 2019-05-20 PROCEDURE — 94640 AIRWAY INHALATION TREATMENT: CPT

## 2019-05-20 PROCEDURE — 36415 COLL VENOUS BLD VENIPUNCTURE: CPT

## 2019-05-20 PROCEDURE — 6370000000 HC RX 637 (ALT 250 FOR IP): Performed by: INTERNAL MEDICINE

## 2019-05-20 PROCEDURE — 2700000000 HC OXYGEN THERAPY PER DAY

## 2019-05-20 PROCEDURE — 80048 BASIC METABOLIC PNL TOTAL CA: CPT

## 2019-05-20 PROCEDURE — 85610 PROTHROMBIN TIME: CPT

## 2019-05-20 PROCEDURE — B246ZZ4 ULTRASONOGRAPHY OF RIGHT AND LEFT HEART, TRANSESOPHAGEAL: ICD-10-PCS | Performed by: INTERNAL MEDICINE

## 2019-05-20 PROCEDURE — 97116 GAIT TRAINING THERAPY: CPT

## 2019-05-20 PROCEDURE — 93325 DOPPLER ECHO COLOR FLOW MAPG: CPT

## 2019-05-20 PROCEDURE — 6360000002 HC RX W HCPCS: Performed by: INTERNAL MEDICINE

## 2019-05-20 PROCEDURE — 2580000003 HC RX 258: Performed by: INTERNAL MEDICINE

## 2019-05-20 PROCEDURE — 93312 ECHO TRANSESOPHAGEAL: CPT

## 2019-05-20 PROCEDURE — 87040 BLOOD CULTURE FOR BACTERIA: CPT

## 2019-05-20 PROCEDURE — 85027 COMPLETE CBC AUTOMATED: CPT

## 2019-05-20 PROCEDURE — 93321 DOPPLER ECHO F-UP/LMTD STD: CPT

## 2019-05-20 PROCEDURE — 1210000000 HC MED SURG R&B

## 2019-05-20 PROCEDURE — 2580000003 HC RX 258

## 2019-05-20 PROCEDURE — 93312 ECHO TRANSESOPHAGEAL: CPT | Performed by: INTERNAL MEDICINE

## 2019-05-20 RX ORDER — INSULIN GLARGINE 100 [IU]/ML
20 INJECTION, SOLUTION SUBCUTANEOUS 2 TIMES DAILY
Status: DISCONTINUED | OUTPATIENT
Start: 2019-05-20 | End: 2019-05-20

## 2019-05-20 RX ORDER — AZITHROMYCIN 250 MG/1
500 TABLET, FILM COATED ORAL DAILY
Status: DISCONTINUED | OUTPATIENT
Start: 2019-05-20 | End: 2019-05-28 | Stop reason: HOSPADM

## 2019-05-20 RX ORDER — NITROFURANTOIN 25; 75 MG/1; MG/1
100 CAPSULE ORAL EVERY 12 HOURS SCHEDULED
Status: DISCONTINUED | OUTPATIENT
Start: 2019-05-20 | End: 2019-05-20

## 2019-05-20 RX ORDER — LIDOCAINE HYDROCHLORIDE 20 MG/ML
15 SOLUTION OROPHARYNGEAL
Status: DISCONTINUED | OUTPATIENT
Start: 2019-05-20 | End: 2019-05-28 | Stop reason: HOSPADM

## 2019-05-20 RX ORDER — MIDAZOLAM HYDROCHLORIDE 1 MG/ML
4 INJECTION INTRAMUSCULAR; INTRAVENOUS ONCE
Status: DISCONTINUED | OUTPATIENT
Start: 2019-05-20 | End: 2019-05-28 | Stop reason: HOSPADM

## 2019-05-20 RX ORDER — INSULIN GLARGINE 100 [IU]/ML
15 INJECTION, SOLUTION SUBCUTANEOUS 2 TIMES DAILY
Status: DISCONTINUED | OUTPATIENT
Start: 2019-05-20 | End: 2019-05-28

## 2019-05-20 RX ORDER — ALPRAZOLAM 0.5 MG/1
0.5 TABLET ORAL
Status: ACTIVE | OUTPATIENT
Start: 2019-05-20 | End: 2019-05-20

## 2019-05-20 RX ORDER — SODIUM CHLORIDE 450 MG/100ML
INJECTION, SOLUTION INTRAVENOUS
Status: COMPLETED
Start: 2019-05-20 | End: 2019-05-20

## 2019-05-20 RX ORDER — CIPROFLOXACIN 500 MG/1
500 TABLET, FILM COATED ORAL EVERY 12 HOURS SCHEDULED
Status: COMPLETED | OUTPATIENT
Start: 2019-05-20 | End: 2019-05-25

## 2019-05-20 RX ORDER — BACTERIOSTATIC SODIUM CHLORIDE 0.9 %
30 VIAL (ML) INJECTION ONCE
Status: DISCONTINUED | OUTPATIENT
Start: 2019-05-20 | End: 2019-05-28 | Stop reason: HOSPADM

## 2019-05-20 RX ORDER — FENTANYL CITRATE 50 UG/ML
100 INJECTION, SOLUTION INTRAMUSCULAR; INTRAVENOUS ONCE
Status: DISCONTINUED | OUTPATIENT
Start: 2019-05-20 | End: 2019-05-28 | Stop reason: HOSPADM

## 2019-05-20 RX ADMIN — INSULIN GLARGINE 15 UNITS: 100 INJECTION, SOLUTION SUBCUTANEOUS at 23:59

## 2019-05-20 RX ADMIN — ATORVASTATIN CALCIUM 40 MG: 40 TABLET, FILM COATED ORAL at 20:55

## 2019-05-20 RX ADMIN — GUAIFENESIN 600 MG: 600 TABLET, EXTENDED RELEASE ORAL at 20:55

## 2019-05-20 RX ADMIN — ENOXAPARIN SODIUM 40 MG: 40 INJECTION SUBCUTANEOUS at 11:07

## 2019-05-20 RX ADMIN — GUAIFENESIN 600 MG: 600 TABLET, EXTENDED RELEASE ORAL at 11:07

## 2019-05-20 RX ADMIN — AZITHROMYCIN MONOHYDRATE 500 MG: 250 TABLET ORAL at 17:19

## 2019-05-20 RX ADMIN — ASPIRIN 81 MG: 81 TABLET, COATED ORAL at 11:07

## 2019-05-20 RX ADMIN — INSULIN GLARGINE 15 UNITS: 100 INJECTION, SOLUTION SUBCUTANEOUS at 17:18

## 2019-05-20 RX ADMIN — SODIUM CHLORIDE 1000 ML: 4.5 INJECTION, SOLUTION INTRAVENOUS at 08:13

## 2019-05-20 RX ADMIN — INSULIN LISPRO 8 UNITS: 100 INJECTION, SOLUTION INTRAVENOUS; SUBCUTANEOUS at 17:18

## 2019-05-20 RX ADMIN — INSULIN LISPRO 4 UNITS: 100 INJECTION, SOLUTION INTRAVENOUS; SUBCUTANEOUS at 11:07

## 2019-05-20 RX ADMIN — IPRATROPIUM BROMIDE AND ALBUTEROL SULFATE 1 AMPULE: .5; 3 SOLUTION RESPIRATORY (INHALATION) at 07:42

## 2019-05-20 RX ADMIN — NITROFURANTOIN MONOHYDRATE/MACROCRYSTALLINE 100 MG: 25; 75 CAPSULE ORAL at 20:55

## 2019-05-20 RX ADMIN — IPRATROPIUM BROMIDE AND ALBUTEROL SULFATE 1 AMPULE: .5; 3 SOLUTION RESPIRATORY (INHALATION) at 20:36

## 2019-05-20 RX ADMIN — LISINOPRIL 5 MG: 5 TABLET ORAL at 11:07

## 2019-05-20 RX ADMIN — IPRATROPIUM BROMIDE AND ALBUTEROL SULFATE 1 AMPULE: .5; 3 SOLUTION RESPIRATORY (INHALATION) at 14:19

## 2019-05-20 RX ADMIN — Medication 10 ML: at 11:15

## 2019-05-20 RX ADMIN — IPRATROPIUM BROMIDE AND ALBUTEROL SULFATE 1 AMPULE: .5; 3 SOLUTION RESPIRATORY (INHALATION) at 11:14

## 2019-05-20 RX ADMIN — CIPROFLOXACIN HYDROCHLORIDE 500 MG: 500 TABLET, FILM COATED ORAL at 23:51

## 2019-05-20 ASSESSMENT — ENCOUNTER SYMPTOMS
CHEST TIGHTNESS: 0
CONSTIPATION: 0
VOMITING: 0
NAUSEA: 0
ABDOMINAL DISTENTION: 0
COUGH: 0
TROUBLE SWALLOWING: 0
WHEEZING: 0
ABDOMINAL PAIN: 0
DIARRHEA: 0
SHORTNESS OF BREATH: 0
COLOR CHANGE: 0

## 2019-05-20 NOTE — PROGRESS NOTES
Stevenson Weston is a 79 y.o. female patient.  Pt was seen and evaluated, pre cert started    Current Facility-Administered Medications   Medication Dose Route Frequency Provider Last Rate Last Dose    ALPRAZolam (XANAX) tablet 0.5 mg  0.5 mg Oral Once PRN Troy J Holiday, DO        lidocaine viscous hcl (XYLOCAINE) 2 % solution 15 mL  15 mL Mouth/Throat Q3H PRN Daryn Aguilar MD        midazolam (VERSED) injection 4 mg  4 mg Intravenous Once Daryn Aguilar MD        fentaNYL (SUBLIMAZE) injection 100 mcg  100 mcg Intravenous Once Daryn Aguilar MD        sodium chloride bacteriostatic 0.9 % injection 30 mL  30 mL Injection Once Daryn Aguilar MD        albuterol (PROVENTIL) nebulizer solution 2.5 mg  2.5 mg Nebulization Q2H PRN Aleatha Pool Sedar, DO   2.5 mg at 05/19/19 0231    ipratropium-albuterol (DUONEB) nebulizer solution 1 ampule  1 ampule Inhalation 4x daily Alejandra Gallagher MD   1 ampule at 05/20/19 1114    lisinopril (PRINIVIL;ZESTRIL) tablet 5 mg  5 mg Oral Daily Wes J Holiday, DO   5 mg at 05/20/19 1107    cefTRIAXone (ROCEPHIN) 1 g IVPB in 50 mL D5W minibag  1 g Intravenous Q24H Alejandra Gallagher MD   Stopped at 05/19/19 1705    glucose (GLUTOSE) 40 % oral gel 15 g  15 g Oral PRN Aleatha Pool Sedar, DO        dextrose 50 % solution 12.5 g  12.5 g Intravenous PRN Aleatha Pool Sedar, DO        glucagon (rDNA) injection 1 mg  1 mg Intramuscular PRN Aleatha Pool Sedar, DO        dextrose 5 % solution  100 mL/hr Intravenous PRN Aleatha Pool Sedar, DO        insulin lispro (HUMALOG) injection vial 0-12 Units  0-12 Units Subcutaneous TID SAYDA RAGLAND Sedar, DO   4 Units at 05/20/19 1107    insulin lispro (HUMALOG) injection vial 0-6 Units  0-6 Units Subcutaneous Nightly Aleatha Pool Sedar, DO   4 Units at 05/19/19 2105    guaiFENesin (MUCINEX) extended release tablet 600 mg  600 mg Oral BID Aleatha Pool Sedar, DO   600 mg at 05/20/19 1107    sodium chloride flush 0.9 % injection 10 mL  10 mL Intravenous 2 times per day Tatiana Lewis DO   10 mL at 05/20/19 1115    sodium chloride flush 0.9 % injection 10 mL  10 mL Intravenous PRN [place] Sedar, DO        magnesium hydroxide (MILK OF MAGNESIA) 400 MG/5ML suspension 30 mL  30 mL Oral Daily PRN Katiuska Falter Sedar, DO        ondansetron (ZOFRAN) injection 4 mg  4 mg Intravenous Q6H PRN Katiuska Alex Sedar, DO        enoxaparin (LOVENOX) injection 40 mg  40 mg Subcutaneous Daily [place] Sedar, DO   40 mg at 05/20/19 1107    labetalol (NORMODYNE;TRANDATE) injection syringe 10 mg  10 mg Intravenous Q10 Min PRN Katiuska Johnson Sedar, DO        aspirin EC tablet 81 mg  81 mg Oral Daily Katiuska Alex Sedar, DO   81 mg at 05/20/19 1107    atorvastatin (LIPITOR) tablet 40 mg  40 mg Oral Nightly Getacehw D Sedar, DO   40 mg at 05/19/19 2106     No Known Allergies  Active Problems:    Cerebrovascular accident (CVA) due to occlusion of small artery    TIA (transient ischemic attack)  Resolved Problems:    * No resolved hospital problems. *    Blood pressure (!) 150/82, pulse 87, temperature 97.3 °F (36.3 °C), temperature source Oral, resp. rate 12, height 5' 3\" (1.6 m), weight 195 lb (88.5 kg), SpO2 98 %. Subjective:  Symptoms:  No shortness of breath, malaise, cough, chest pain, weakness, headache, chest pressure, anorexia, diarrhea or anxiety. Diet:  No nausea or vomiting. Objective:  General Appearance:  Well-appearing, in no acute distress and comfortable. Vital signs: (most recent): Blood pressure (!) 150/82, pulse 87, temperature 97.3 °F (36.3 °C), temperature source Oral, resp. rate 12, height 5' 3\" (1.6 m), weight 195 lb (88.5 kg), SpO2 98 %. HEENT: Normal HEENT exam.    Lungs:  (+ wheezing, decrease BS)  Heart: S1 normal and S2 normal.    Abdomen: Abdomen is soft. Bowel sounds are normal.   There is no epigastric area or suprapubic area tenderness. Pulses: Distal pulses are intact. Neurological: Patient is alert and oriented to person, place and time. Pupils:  Pupils are equal, round, and reactive to light. Skin:  Warm and dry.       Lab Results   Component Value Date    WBC 8.6 05/20/2019    HGB 12.9 05/20/2019    HCT 39.1 05/20/2019    MCV 89.4 05/20/2019     05/20/2019     Lab Results   Component Value Date     05/20/2019    K 3.9 05/20/2019     05/20/2019    CO2 25 05/20/2019    BUN 13 05/20/2019    CREATININE 0.49 05/20/2019    GLUCOSE 264 05/20/2019    CALCIUM 9.7 05/20/2019        Assessment & Plan  Mild copd exacerbation  duonebs  Azithromycin PO  2) RLE weakness better, TIA  LEONID negative for thrombus and vegetation     UTI  - urine culture positive for GNB, continue rocephin     NIDDM2 with hyperglycemia  Better controlled           Paris Espino MD  5/20/2019

## 2019-05-20 NOTE — PROGRESS NOTES
Physical Therapy Med Surg Daily Treatment Note  Facility/Department: [address] NEURO  Room: 25/N225-       NAME: Ryan Jack  : 1950 (79 y.o.)  MRN: [id number]  CODE STATUS: Full Code    Date of Service: 2019    Patient Diagnosis(es): TIA (transient ischemic attack) [G45.9]  TIA (transient ischemic attack) [G45.9]   Chief Complaint   Patient presents with    Extremity Weakness     right leg weakness, woke up from a nap at 1730 today and right leg is weak     Patient Active Problem List    Diagnosis Date Noted    Cerebrovascular accident (CVA) due to occlusion of small artery      Priority: High    TIA (transient ischemic attack) 2019    Lumbosacral spondylosis without myelopathy 2015    Cervical spondylosis without myelopathy 2015        Past Medical History:   Diagnosis Date    Asthma     Bladder infection     Chicken pox     Chronic back pain     Hypertension     Measles     Mumps     Osteoarthritis     Pneumonia     Whooping cough      Past Surgical History:   Procedure Laterality Date    APPENDECTOMY      HYSTERECTOMY      TONSILLECTOMY  1966     Restrictions  Restrictions/Precautions: Fall Risk    Subjective   General  Chart Reviewed: Yes  Family / Caregiver Present: No  Subjective  Subjective: patient resting in bed, agreeable to tx. continues to c/o RLE weakness   Pain Screening  Patient Currently in Pain: Denies  Pain Reassessment: denies     Objective   Bed mobility  Bridging: Stand by assistance  Supine to Sit: Stand by assistance  Comment: patient required increased time and effort for bed mobility     Transfers  Sit to Stand: Contact guard assistance  Stand to sit: Contact guard assistance    Ambulation  Ambulation?: Yes  Ambulation 1  Surface: level tile  Device: Rolling Walker  Assistance: Contact guard assistance  Quality of Gait: significant RLE weakness with foot drop, inconsistent step length, significant decrease in speed, no LOB however

## 2019-05-20 NOTE — PROGRESS NOTES
Neurology Daily Progress Note  Name: Avtar Tam  Age: 79 y.o. Gender: female  CodeStatus: Full Code  Allergies: No Known Allergies    Chief Complaint:Extremity Weakness (right leg weakness, woke up from a nap at 1730 today and right leg is weak)    Primary Care Provider: Abraham Tate MD  InpatientTreatment Team: Treatment Team: Attending Provider: Mateo Anthony MD; Consulting Physician: Eunice Walden MD; Consulting Physician: Micheline De La Paz DO; : Mary Jo Rooney RN; Registered Nurse: Lupillo Bean RN  Admission Date: 5/17/2019      HPI   Pt seen and examined for neuro follow up for RLE weakness. A&O x3, NAD, pleasant and cooperative. Pt still with some weakness to RLe strength 4/5. No paresthesia/numbness. Denies back pain. No radiculopathy. No new focal neuro deficits. Denies  Headache, double vision, blurry vision, difficulty with speech, difficulty with swallowing, numbness, pain, nausea, vomiting, choking, neck pain, dizziness  Vitals:    05/20/19 0742   BP:    Pulse:    Resp: 16   Temp:    SpO2: 98%        Physical Exam   Constitutional: She is oriented to person, place, and time. She appears well-nourished. No distress. HENT:   Head: Normocephalic and atraumatic. Eyes: Pupils are equal, round, and reactive to light. EOM are normal.   Neck: Neck supple. No JVD present. Cardiovascular: Normal rate and regular rhythm. Pulmonary/Chest: Effort normal and breath sounds normal. No respiratory distress. Abdominal: Soft. Bowel sounds are normal. She exhibits no distension. There is no tenderness. Musculoskeletal: She exhibits no edema. Neurological: She is alert and oriented to person, place, and time. No cranial nerve deficit. RLE strength 4/5   Skin: Skin is warm and dry. She is not diaphoretic. Psychiatric: She has a normal mood and affect.          Review of Systems   Constitutional: Negative for appetite change, chills, fatigue, fever and unexpected weight BUN 12 11 13   CREATININE 0.51 0.50 0.49*   CALCIUM 8.8 9.3 9.7     Recent Labs     19  1913   AST 15   ALT 12   BILITOT 0.3   ALKPHOS 89     Recent Labs     19  0532   INR 1.0     Recent Labs     19  1913 19  0601 19  1154   TROPONINI <0.010 <0.010 <0.010       Urinalysis:   Lab Results   Component Value Date    NITRU POSITIVE 2019    WBCUA  2019    BACTERIA MODERATE 2019    RBCUA 3-5 2019    BLOODU Negative 2019    SPECGRAV 1.038 2019    GLUCOSEU 250 2019       Radiology:   Most recent    Chest CT      WITH CONTRAST:No results found for this or any previous visit. WITHOUT CONTRAST: No results found for this or any previous visit. CXR      2-view:   Results for orders placed during the hospital encounter of 19   XR CHEST STANDARD (2 VW)    Narrative EXAMINATION: XR CHEST (2 VW)    CLINICAL HISTORY: COUGH    COMPARISONS: May 17, 2019. FINDINGS:    Osseous structures intact. Cardiopericardial silhouette normal. Pulmonary vasculature normal. Lungs clear. Impression No acute cardiopulmonary disease. Portable:   Results for orders placed during the hospital encounter of 19   XR CHEST PORTABLE    Narrative EXAMINATION: XR CHEST PORTABLE    CLINICAL HISTORY:   LOSS OF USE OF RIGHT LEG     COMPARISONS: 2013    FINDINGS: Osseous structures intact. Cardiopericardial silhouette normal. Pulmonary vasculature normal. Lungs clear. Impression NO ACUTE CARDIOPULMONARY DISEASE. Echo No results found for this or any previous visit. Assessment/Plan:  TIA  RLE weakness  MRI brain no acute process, T2 hyperintense focus periventricular similar to 11  Carotid US <50% internal bilat, bilat plaque burden at carotid bifurcation,  TTE showed IAS aneurysm, tech difficulty bubble study  LEONID  negativeASA 81mg daily  Will d/c tomorrow        Collaborating physicians: Dr Parrish Charles, Dr REYNALDO Riddle,  Sterling Gabriel    Electronically signed by MAJOR Edmondson CNP on 5/20/2019 at 9:10 AM

## 2019-05-20 NOTE — PROGRESS NOTES
Nutrition Education    Type and Reason for Visit: Consult, Patient Education(\"Diet Ed\")    Consult received for \"pt diet education. \" Pt declines any educational needs at this time. Will follow per protocol.      Electronically signed by Aspen Fuentes RD, LD on 5/20/19 at 2:13 PM

## 2019-05-20 NOTE — FLOWSHEET NOTE
3591- Shift assessment completed at this time, Pt A&Ox4, denies chest pain/SOB, denies nausea/vomiting, expiratory wheezes heard bilaterally on 2 Liters nasal cannula, PERRLA, denies numbness/tingling, right foot push/pulls weaker when compared bilaterally, strength equal in upper extremities, left facial droop noted, pt complains of urine frequency, pt denies further needs at this time, bed alarm engaged, call light in reach-KGW  0756- Pt down to cath lab via wheelchair-KGW  1050- Checked pt gag reflex, pt positive gag reflex, tolerating water and ice chips-KGW  1059- Perfectserv sent to Dr. Cherise Thacker, per Dr. Cherise Thacker okay to resume prior orders and diet-KGW  1200- Pt up to bedside commode without difficulty-KGW  1830- Pt voiding frequently minimal amounts, denies burning with urination, bladder scan done post void with most residual being 252ml-KGW  1837- Perfect serv message sent to Dr. Dorota Majano about pt not having IV access and frequent urination, per Dr. Dorota Majano pt does not need IV, new order received to do bladder scan in 2 hours-KGW  Electronically signed by Vasyl Katz RN

## 2019-05-20 NOTE — CARE COORDINATION
LSW faxed info to Leanne to start precert for pt to transfer to [place] when medicallt stable for DC. Pt off floor for a LEONID.  [id number] completed.

## 2019-05-20 NOTE — BRIEF OP NOTE
Brief Postoperative Note  ______________________________________________________________    Patient: Stevenson Weston  YOB: 1950  MRN: [id number]   Section of Cardiology  Adult Brief Procedure Note        Procedure(s):  LEONID with bubble study    Pre-operative Diagnosis:  CVA/TIA    H&P Status: Completed and reviewed.      Post-operative Diagnosis:    LVEF 55  Mild MR  Aneurysmal IAS with no PFO  No Thrombus    Findings: see full report    Complications:  none    Primary Proceduralist:   Lamar Alvarez,

## 2019-05-20 NOTE — PROGRESS NOTES
05/18/2019    LDLCALC 118 11/24/2013    LDLCALC 103 09/12/2013     No results found for: LABVLDL, VLDL  No results found for: CHOLHDLRATIO    Ammonia:No results for input(s): AMMONIA in the last 72 hours. LFT:   Recent Labs     05/17/19  1913   AST 15   ALT 12   BILITOT 0.3   ALKPHOS 89        Urine:   Recent Labs     05/17/19 2038   COLORU Yellow   CLARITYU Clear   GLUCOSEU 250*   BLOODU Negative   PHUR 5.0   PROTEINU Negative   UROBILINOGEN 0.2   LEUKOCYTESUR MODERATE*        Assessment/Plan:  TIA, TTE shows intra-atrial septal aneurysm. No stroke on the MRI however patient feels some residual weakness, perhaps it was a punctate area that was missed     Plan:      - needs LEONID, cardiology consult, will need eval for thrombus in atrial appendage.   Will need outpatient 30 day event monitor  - TIA work up is almost completed, with MRI brain, CTA of the head and neck and carotid dopplers  - continue ASA          Electronically signed by Alec Stratton MD on 5/19/2019 at 8:29 PM

## 2019-05-20 NOTE — DISCHARGE INSTR - COC
Continuity of Care Form    Patient Name: Joseph Gasca   :  1950  MRN:  [id number]    Admit date:  2019  Discharge date:  2019    Code Status Order: Full Code   Advance Directives:   [facility] Documentation     Date/Time Healthcare Directive Type of Healthcare Directive Copy in [facility] [address] Agent's Name Healthcare Agent's Phone Number    19 [phone]  No, patient does not have an advance directive for healthcare treatment  --  --  --  --  --    19 0009  No, patient does not have an advance directive for healthcare treatment  --  --  --  --  --          Admitting Physician:  Ignacio Jean Baptiste DO  PCP: Sundeep Lomeli MD    Discharging Nurse: [facility] LTCU Unit/Room#: N225/N225-01  Discharging Unit Phone Number: [phone]    Emergency Contact:   Extended Emergency Contact Information  Primary Emergency Contact: [address] of [address] [place] [address] Phone: [phone]  Relation: Child    Past Surgical History:  Past Surgical History:   Procedure Laterality Date   [address] [place] [address]       Immunization History: There is no immunization history on file for this patient.     Active Problems:  Patient Active Problem List   Diagnosis Code    Lumbosacral spondylosis without myelopathy M47.817    Cervical spondylosis without myelopathy M47.812    TIA (transient ischemic attack) G45.9       Isolation/Infection:   Isolation          No Isolation            Nurse Assessment:  Last Vital Signs: BP (!) 163/86   Pulse 80   Temp 98.1 °F (36.7 °C) (Oral)   Resp 12   Ht 5' 3\" (1.6 m)   Wt 195 lb (88.5 kg)   LMP  (LMP Unknown)   SpO2 99%   BMI 34.54 kg/m²     Last documented pain score (0-10 scale):    Last Weight:   Wt Readings from Last 1 Encounters:   19 195 lb (88.5 kg)     Mental Status:  oriented and alert    IV Access:  - None    Nursing Mobility/ADLs:  Walking Assisted  Transfer  Assisted  Bathing  Assisted  Dressing  Assisted  Toileting  Assisted  Feeding  Assisted  Med Admin  Assisted  Med Delivery   whole    Wound Care Documentation and Therapy:        Elimination:  Continence:   · Bowel: Yes  · Bladder: Yes  Urinary Catheter: None   Colostomy/Ileostomy/Ileal Conduit: No       Date of Last BM: 05/27/2019    Intake/Output Summary (Last 24 hours) at 5/20/2019 1010  Last data filed at 5/19/2019 2103  Gross per 24 hour   Intake 1330 ml   Output --   Net 1330 ml     I/O last 3 completed shifts: In: 1330 [P.O.:1320; I.V.:10]  Out: -     Safety Concerns: At Risk for Falls    Impairments/Disabilities:      None    Nutrition Therapy:  Current Nutrition Therapy:   - Oral Diet:  Carb Control 4 carbs/meal (1800kcals/day)    Routes of Feeding: Oral  Liquids: Thin Liquids  Daily Fluid Restriction: no  Last Modified Barium Swallow with Video (Video Swallowing Test): not done    Treatments at the Time of Hospital Discharge:   Respiratory Treatments: ***  Oxygen Therapy:  is not on home oxygen therapy.   Ventilator:    - No ventilator support    Rehab Therapies: Physical Therapy and Occupational Therapy  Weight Bearing Status/Restrictions: No weight bearing restirctions  Other Medical Equipment (for information only, NOT a DME order):  walker  Other Treatments: ***    Patient's personal belongings (please select all that are sent with patient):  {[facility] DME Belongings:[id number]}    RN SIGNATURE:  Electronically signed by Mac Driscoll RN on 5/28/19 at 6:37 PM    CASE MANAGEMENT/SOCIAL WORK SECTION    Inpatient Status Date: ***    Readmission Risk Assessment Score:  Readmission Risk              Risk of Unplanned Readmission:        9           Discharging to Facility/ Agency   · Name: Sharmaine Bhatt  · Address:  · Phone:[phone]  · Fax:    Dialysis Facility (if applicable)   · Name:  · Address:  · Dialysis Schedule:  · Phone:  · Fax:    / signature: Electronically signed by CLEMENTINE Elder on 5/20/19 at 10:10 AM    PHYSICIAN SECTION    Prognosis: {Prognosis:[id number]}    Condition at Discharge: stable    Rehab Potential (if transferring to Rehab): good    Recommended Labs or Other Treatments After Discharge:     Neurosurgery as out pt for back surgery  Pt completed coarse of abx  Pt has urge incontinece    PHYSICIAN SIGNATURE:  Electronically signed by Ely Rincon MD on 5/28/19 at 6:34 PM

## 2019-05-21 ENCOUNTER — APPOINTMENT (OUTPATIENT)
Dept: MRI IMAGING | Age: 69
DRG: 191 | End: 2019-05-21
Payer: MEDICARE

## 2019-05-21 LAB
ANION GAP SERPL CALCULATED.3IONS-SCNC: 12 MEQ/L (ref 9–15)
BUN BLDV-MCNC: 16 MG/DL (ref 8–23)
CALCIUM SERPL-MCNC: 9.5 MG/DL (ref 8.5–9.9)
CHLORIDE BLD-SCNC: 103 MEQ/L (ref 95–107)
CO2: 25 MEQ/L (ref 20–31)
CREAT SERPL-MCNC: 0.58 MG/DL (ref 0.5–0.9)
GFR AFRICAN AMERICAN: >60
GFR NON-AFRICAN AMERICAN: >60
GLUCOSE BLD-MCNC: 117 MG/DL (ref 60–115)
GLUCOSE BLD-MCNC: 141 MG/DL (ref 60–115)
GLUCOSE BLD-MCNC: 200 MG/DL (ref 60–115)
GLUCOSE BLD-MCNC: 224 MG/DL (ref 60–115)
GLUCOSE BLD-MCNC: 233 MG/DL (ref 60–115)
GLUCOSE BLD-MCNC: 275 MG/DL (ref 70–99)
HCT VFR BLD CALC: 39.8 % (ref 37–47)
HEMOGLOBIN: 13.6 G/DL (ref 12–16)
MCH RBC QN AUTO: 30.5 PG (ref 27–31.3)
MCHC RBC AUTO-ENTMCNC: 34.1 % (ref 33–37)
MCV RBC AUTO: 89.4 FL (ref 82–100)
PDW BLD-RTO: 14 % (ref 11.5–14.5)
PERFORMED ON: ABNORMAL
PLATELET # BLD: 241 K/UL (ref 130–400)
PLATELET SLIDE REVIEW: ADEQUATE
POTASSIUM SERPL-SCNC: 4.3 MEQ/L (ref 3.4–4.9)
RBC # BLD: 4.46 M/UL (ref 4.2–5.4)
SLIDE REVIEW: NORMAL
SODIUM BLD-SCNC: 140 MEQ/L (ref 135–144)
WBC # BLD: 8.9 K/UL (ref 4.8–10.8)

## 2019-05-21 PROCEDURE — 6370000000 HC RX 637 (ALT 250 FOR IP): Performed by: INTERNAL MEDICINE

## 2019-05-21 PROCEDURE — 85027 COMPLETE CBC AUTOMATED: CPT

## 2019-05-21 PROCEDURE — 6360000002 HC RX W HCPCS: Performed by: INTERNAL MEDICINE

## 2019-05-21 PROCEDURE — 72148 MRI LUMBAR SPINE W/O DYE: CPT

## 2019-05-21 PROCEDURE — 99231 SBSQ HOSP IP/OBS SF/LOW 25: CPT | Performed by: INTERNAL MEDICINE

## 2019-05-21 PROCEDURE — 94640 AIRWAY INHALATION TREATMENT: CPT

## 2019-05-21 PROCEDURE — 2580000003 HC RX 258: Performed by: INTERNAL MEDICINE

## 2019-05-21 PROCEDURE — 94760 N-INVAS EAR/PLS OXIMETRY 1: CPT

## 2019-05-21 PROCEDURE — 36415 COLL VENOUS BLD VENIPUNCTURE: CPT

## 2019-05-21 PROCEDURE — 1210000000 HC MED SURG R&B

## 2019-05-21 PROCEDURE — 80048 BASIC METABOLIC PNL TOTAL CA: CPT

## 2019-05-21 PROCEDURE — 99223 1ST HOSP IP/OBS HIGH 75: CPT | Performed by: INTERNAL MEDICINE

## 2019-05-21 RX ORDER — PREDNISONE 1 MG/1
10 TABLET ORAL DAILY
Status: DISCONTINUED | OUTPATIENT
Start: 2019-05-27 | End: 2019-05-28 | Stop reason: HOSPADM

## 2019-05-21 RX ORDER — PREDNISONE 1 MG/1
5 TABLET ORAL DAILY
Status: DISCONTINUED | OUTPATIENT
Start: 2019-05-30 | End: 2019-05-28 | Stop reason: HOSPADM

## 2019-05-21 RX ORDER — LORAZEPAM 2 MG/ML
1 INJECTION INTRAMUSCULAR
Status: ACTIVE | OUTPATIENT
Start: 2019-05-21 | End: 2019-05-21

## 2019-05-21 RX ORDER — BUDESONIDE 0.5 MG/2ML
0.5 INHALANT ORAL 2 TIMES DAILY
Status: DISCONTINUED | OUTPATIENT
Start: 2019-05-21 | End: 2019-05-28 | Stop reason: HOSPADM

## 2019-05-21 RX ORDER — PREDNISONE 20 MG/1
20 TABLET ORAL DAILY
Status: COMPLETED | OUTPATIENT
Start: 2019-05-21 | End: 2019-05-23

## 2019-05-21 RX ORDER — PREDNISONE 1 MG/1
15 TABLET ORAL DAILY
Status: COMPLETED | OUTPATIENT
Start: 2019-05-24 | End: 2019-05-26

## 2019-05-21 RX ORDER — METHYLPREDNISOLONE SODIUM SUCCINATE 40 MG/ML
40 INJECTION, POWDER, LYOPHILIZED, FOR SOLUTION INTRAMUSCULAR; INTRAVENOUS EVERY 8 HOURS
Status: DISCONTINUED | OUTPATIENT
Start: 2019-05-21 | End: 2019-05-21

## 2019-05-21 RX ADMIN — IPRATROPIUM BROMIDE AND ALBUTEROL SULFATE 1 AMPULE: .5; 3 SOLUTION RESPIRATORY (INHALATION) at 19:09

## 2019-05-21 RX ADMIN — METHYLPREDNISOLONE SODIUM SUCCINATE 40 MG: 40 INJECTION, POWDER, FOR SOLUTION INTRAMUSCULAR; INTRAVENOUS at 13:42

## 2019-05-21 RX ADMIN — CIPROFLOXACIN HYDROCHLORIDE 500 MG: 500 TABLET, FILM COATED ORAL at 09:25

## 2019-05-21 RX ADMIN — PREDNISONE 20 MG: 20 TABLET ORAL at 20:37

## 2019-05-21 RX ADMIN — INSULIN GLARGINE 15 UNITS: 100 INJECTION, SOLUTION SUBCUTANEOUS at 20:37

## 2019-05-21 RX ADMIN — INSULIN LISPRO 4 UNITS: 100 INJECTION, SOLUTION INTRAVENOUS; SUBCUTANEOUS at 09:27

## 2019-05-21 RX ADMIN — AZITHROMYCIN MONOHYDRATE 500 MG: 250 TABLET ORAL at 09:25

## 2019-05-21 RX ADMIN — ENOXAPARIN SODIUM 40 MG: 40 INJECTION SUBCUTANEOUS at 09:26

## 2019-05-21 RX ADMIN — GUAIFENESIN 600 MG: 600 TABLET, EXTENDED RELEASE ORAL at 20:37

## 2019-05-21 RX ADMIN — Medication 10 ML: at 20:37

## 2019-05-21 RX ADMIN — INSULIN GLARGINE 15 UNITS: 100 INJECTION, SOLUTION SUBCUTANEOUS at 09:28

## 2019-05-21 RX ADMIN — ASPIRIN 81 MG: 81 TABLET, COATED ORAL at 09:25

## 2019-05-21 RX ADMIN — IPRATROPIUM BROMIDE AND ALBUTEROL SULFATE 1 AMPULE: .5; 3 SOLUTION RESPIRATORY (INHALATION) at 15:58

## 2019-05-21 RX ADMIN — IPRATROPIUM BROMIDE AND ALBUTEROL SULFATE 1 AMPULE: .5; 3 SOLUTION RESPIRATORY (INHALATION) at 10:55

## 2019-05-21 RX ADMIN — IPRATROPIUM BROMIDE AND ALBUTEROL SULFATE 1 AMPULE: .5; 3 SOLUTION RESPIRATORY (INHALATION) at 05:59

## 2019-05-21 RX ADMIN — INSULIN LISPRO 2 UNITS: 100 INJECTION, SOLUTION INTRAVENOUS; SUBCUTANEOUS at 12:33

## 2019-05-21 RX ADMIN — CIPROFLOXACIN HYDROCHLORIDE 500 MG: 500 TABLET, FILM COATED ORAL at 20:36

## 2019-05-21 RX ADMIN — ATORVASTATIN CALCIUM 40 MG: 40 TABLET, FILM COATED ORAL at 20:37

## 2019-05-21 RX ADMIN — BUDESONIDE 500 MCG: 0.5 INHALANT RESPIRATORY (INHALATION) at 19:09

## 2019-05-21 RX ADMIN — GUAIFENESIN 600 MG: 600 TABLET, EXTENDED RELEASE ORAL at 09:25

## 2019-05-21 RX ADMIN — LISINOPRIL 5 MG: 5 TABLET ORAL at 09:26

## 2019-05-21 ASSESSMENT — ENCOUNTER SYMPTOMS
ABDOMINAL PAIN: 0
TROUBLE SWALLOWING: 0
VOMITING: 0
DIARRHEA: 0
STRIDOR: 0
WHEEZING: 1
BLOOD IN STOOL: 0
COUGH: 0
NAUSEA: 0
COLOR CHANGE: 0
CHEST TIGHTNESS: 0
SHORTNESS OF BREATH: 1
CONSTIPATION: 0
GASTROINTESTINAL NEGATIVE: 1
WHEEZING: 0
ABDOMINAL DISTENTION: 0
EYES NEGATIVE: 1
SHORTNESS OF BREATH: 0

## 2019-05-21 NOTE — PROGRESS NOTES
Shift assessment complete. Patient resting in bed just completed breathing treatment. No SOB or chest pain at this time. Vital signs stable, bed in lowest position with bed alarm on, call light in reach. Will continue to monitor patient.  Electronically signed by Galen Loo RN on 5/21/2019 at 7:58 PM

## 2019-05-21 NOTE — FLOWSHEET NOTE
Pt experiencing urgency and frequency. Have taken pt to bathroom 7 times since start of shift.  Bladder scan over 344 ml, placed purwick catheter and message out to hospitalist.

## 2019-05-21 NOTE — PROGRESS NOTES
Physical Therapy Missed Treatment   Facility/Department: [facility] MED SURG N225/N225-01    NAME: Tana Marino    : 1950 (79 y.o.)  MRN: [id number]    Account: [de-identified]  Gender: female    Chart reviewed, attempted PT at 10:15. Patient unavailable 2° to:    [] Hold per nsg request    [] Pt declined      [] Pt. . off floor for test/procedure. [x] Pt. Unavailable - Pt going down for MRI of Lumbar Spine. Awaiting results. Will attempt PT treatment again at earliest convenience.       Electronically signed by Cruz Headley PTA on 19 at 10:17 AM

## 2019-05-21 NOTE — CONSULTS
hospitalization:    Continuous Infusions:   dextrose         Scheduled Meds:   methylPREDNISolone  40 mg Intravenous Q8H    insulin lispro  10 Units Subcutaneous TID     budesonide  0.5 mg Nebulization BID    midazolam  4 mg Intravenous Once    fentanNYL  100 mcg Intravenous Once    sodium chloride bacteriostatic  30 mL Injection Once    insulin glargine  15 Units Subcutaneous BID    azithromycin  500 mg Oral Daily    ciprofloxacin  500 mg Oral 2 times per day    ipratropium-albuterol  1 ampule Inhalation 4x daily    lisinopril  5 mg Oral Daily    insulin lispro  0-12 Units Subcutaneous TID     insulin lispro  0-6 Units Subcutaneous Nightly    guaiFENesin  600 mg Oral BID    sodium chloride flush  10 mL Intravenous 2 times per day    enoxaparin  40 mg Subcutaneous Daily    aspirin  81 mg Oral Daily    atorvastatin  40 mg Oral Nightly       PRN Meds:LORazepam, lidocaine viscous hcl, albuterol, glucose, dextrose, glucagon (rDNA), dextrose, sodium chloride flush, magnesium hydroxide, ondansetron, labetalol    REVIEW OF SYSTEMS:  As in history of present illness  Other 14 point review of system is negative. PHYSICAL EXAM:    Vitals:  BP (!) 144/77   Pulse 106   Temp 97.9 °F (36.6 °C) (Oral)   Resp 18   Ht 5' 3\" (1.6 m)   Wt 195 lb (88.5 kg)   LMP  (LMP Unknown)   SpO2 96%   BMI 34.54 kg/m²   General:Alert, awake, Oriented x3  .comfortable in bed, No distress. Head: Atraumatic , Normocephalic   Eyes: PERRL. No sclera icterus. No conjunctival injection. No discharge   ENT: No nasal  discharge. Pharynx clear. Neck:  Trachea midline. No thyromegaly, no JVD, No cervical adenopathy. Chest : Bilaterally symmetrical ,Normal effort,  No accessory muscle use  Lung : Diminished BS bilateraly. No Rales. No wheezing. No rhonchi. Heart[de-identified] Normal  rate. Regular rhythm. No mumur ,  Rub or gallop  ABD: Non-tender. Non-distended. No masses. No organmegaly. Normal bowel sounds.  No hernia. Musculoskeleton: normal range of motion in all extremites, strength and tone   Extremities: No pitting in both lower leg , No Cyanosis ,No clubbing  Neuro: no cranial nerve abnormality, normal reflex and sensation, no focal weakness   Skin: Warm and dry. No erythema rash on exposed extremities. Data Review  Recent Labs     05/19/19  0613 05/20/19  0532 05/21/19  0522   WBC 6.9 8.6 8.9   HGB 12.9 12.9 13.6   HCT 39.3 39.1 39.8    264 241      Recent Labs     05/19/19  0613 05/20/19  0532 05/21/19  0522    140 140   K 3.8 3.9 4.3    103 103   CO2 25 25 25   BUN 11 13 16   CREATININE 0.50 0.49* 0.58   GLUCOSE 215* 264* 275*       MV Settings: ABGs: No results for input(s): PHART, CZP0KLB, PO2ART, PYN2JKF, BEART, M4PVMSOY, MVC3VCR in the last 72 hours. O2 Device: Nasal cannula  O2 Flow Rate (L/min): 2 L/min  No results found for: LACTA    Radiology  Cta Head W Wo Contrast    Result Date: 5/18/2019  CTA head with intravenous contrast medium. HISTORY: Inability to use left leg. Technical factors: CTA head imaging formatted as contiguous axial images through skull base. Sagittal, coronal, right and left oblique, 3-D MIP obtained during postprocessing. Intravenous contrast medium consisting of Isovue-370, 100 mL utilized. Study done in conjunction with CTA neck reported separately. No prior CT head available for comparison. FINDINGS: Bilateral A1 and A2 segments, anterior carotid arteries patent. Anterior communicating artery patent. Communicating segments bilateral internal carotid arteries Bilateral M1 and M2 segments middle cerebral arteries patent. Congenital absence, bilateral communicating arteries. Bilateral P1, P2 segments, posterior cerebral arteries patent. Basilar tip and basilar artery patent. No aneurysm, or obstruction identified. Negative CTA head.  All CT scans at this facility use dose modulation, iterative reconstruction, and/or weight based dosing when appropriate to reduce radiation dose to as low as reasonably achievable. Xr Chest Standard (2 Vw)    Result Date: 5/18/2019  EXAMINATION: XR CHEST (2 VW) CLINICAL HISTORY: COUGH COMPARISONS: May 17, 2019. FINDINGS: Osseous structures intact. Cardiopericardial silhouette normal. Pulmonary vasculature normal. Lungs clear. No acute cardiopulmonary disease. Ct Head Wo Contrast    Result Date: 5/17/2019  CT HEAD WO CONTRAST : 5/17/2019 CLINICAL HISTORY:  Loss of use of right leg . COMPARISON: Head MRI 6/22/2011. TECHNIQUE: Spiral unenhanced images were obtained of the head, with routine reconstructions performed. All CT scans at this facility use dose modulation, iterative reconstruction, and/or weight based dosing when appropriate to reduce radiation dose to as low as reasonably achievable. FINDINGS: There is no intracranial hemorrhage, mass effect, midline shift, extra-axial collection, evidence of hydrocephalus, recent ischemic infarct, or skull fracture identified. Mild to moderate atrophic changes have not significantly changed from 6/22/2011. The mastoid air cells and visualized paranasal sinuses are essentially clear. NO ACUTE INTRACRANIAL PROCESS IDENTIFIED. Cta Neck W Wo Contrast    Result Date: 5/18/2019  CTA neck with intravenous contrast medium. HISTORY: Unable to move right leg. Technical factors: CTA neck obtained and formatted as contiguous axial images from aortic arch to skull base. Sagittal, coronal, overlap, right and left anterior oblique, and 3-D MIP reconstruction obtained during postprocessing. Intravenous contrast medium, consisting of Isovue-370, 100 mL identified. Study is done in conjunction with CTA head, reported separately. No prior CTA neck images available for comparison. FINDINGS: Right carotid: Right common carotid artery arises from brachiocephalic trunk. Ostium patent. Course and caliber right common carotid artery without anomaly.  Carotid bifurcation without anomaly. Cervical segment patent but tortuous. Petrous, lacerum, clinoid, ophthalmic, cavernous, and communicating segments patent. Left carotid: Left common carotid artery arises from aortic arch. Ostium patent. Course and caliber left common carotid artery without anomaly. Calcification identified carotid bulb yielding 60 % stenosis by NASCET criteria. Remainder cervical segment, as well as petrous, lacerum, clinoid, ophthalmic, cavernous, and communicating segments patent. Right vertebral: Right vertebral artery arises from right subclavian artery. Pre foraminal, foraminal, extradural, and intradural segments patent. Left vertebral: Left vertebral artery arises from left subclavian artery. Pre foraminal, foraminal, extradural, and intradural segments patent. 60 % stenosis left internal carotid artery by NASCET criteria. No obstruction or dissection identified. All CT scans at this facility use dose modulation, iterative reconstruction, and/or weight based dosing when appropriate to reduce radiation dose to as low as reasonably achievable. Mri Lumbar Spine Wo Contrast    Result Date: 5/21/2019  EXAMINATION: MRI LUMBAR SPINE WO CONTRAST: DATE AND TIME: 5/21/2019 at 10:00 AM. CLINICAL HISTORY: LOWER BACK PAIN. WEAKNESS, EXTREMITY (PARAPLEGIA). COMPARISON: None. TECHNIQUE:  Multi-sequence, multiplanar imaging of the lumbar spine was performed. No gadolinium contrast administered. VOLUME: None. MR CONTRAST: None. FINDINGS: Vertebrae: No acute fracture. Marrow signal changes are within normal limits. Conus: The conus medullaris ends at L1 level and is unremarkable. Disc bulging at T11-T12. T12/L1: There is no evidence of disc bulging or disc herniation. No significant facet hypertrophy or thickening of ligamenta flava. There is no central spinal canal stenosis. There is no neural foraminal stenosis. L1/L2:  There is no evidence of disc bulging or disc herniation.  No significant facet hypertrophy or thickening of ligamenta flava. There is no central spinal canal stenosis. There is no neural foraminal stenosis. L2/L3:  Mild disc bulging with ventral ridging on the thecal sac. Mild facet arthropathy. No central canal or foraminal narrowing. L3/L4:  Mild disc space narrowing with broad-based disc bulging and moderate facet arthropathy. Thickening of the ligamenta flava. Findings resulting in mild spinal canal narrowing. No foraminal stenosis. L4/L5:  Broad-based disc bulging and osteophytic spurring off the endplates resulting in ventral ridging on the thecal sac. Changes asymmetric to the right with resulting right foraminal stenosis and compression on the exiting right L4 nerve root. Facet arthropathy combining with disc disease with borderline canal narrowing. No left foraminal stenosis. L5/S1: Broad-based disc bulging/protrusion with osteophytic spurring. Facet hypertrophy. Borderline canal narrowing. There is bilateral foraminal narrowing, left greater than right. Retroperitoneum: No abnormality of the visualized aorta or retroperitoneum. MULTILEVEL DISC DISEASE WITH MILD CANAL NARROWING AT L3-L4. RIGHT L4-L5 FORAMINAL STENOSIS SECONDARY TO DISC OSTEOPHYTE CHANGES/PROTRUSION. L5-S1 MILD FORAMINAL NARROWING, LEFT GREATER THAN RIGHT. Xr Chest Portable    Result Date: 5/18/2019  EXAMINATION: XR CHEST PORTABLE CLINICAL HISTORY:   LOSS OF USE OF RIGHT LEG COMPARISONS: NOVEMBER 24, 2013 FINDINGS: Osseous structures intact. Cardiopericardial silhouette normal. Pulmonary vasculature normal. Lungs clear. NO ACUTE CARDIOPULMONARY DISEASE. Mri Brain Wo Contrast    Result Date: 5/18/2019  EXAMINATION:  MRI BRAIN WITHOUT IV CONTRAST CLINICAL HISTORY:  RIGHT LOWER EXTREMITY WEAKNESS, EVALUATE FOR POSSIBLE TIA OR CVA COMPARISON:  5/17/2019 CT BRAIN TECHNIQUE:  Multisequence and multiplane MRI brain is obtained without IV gadolinium.  FINDINGS:  There is cerebral atrophy and age related findings in the brain. There is relatively extensive patchy T2 hyperintense foci in the periventricular white matter of both cerebral hemispheres somewhat similar to the 1/22/2011 MRI brain. Differential considerations include a manifestation of small vessel disease or microangiopathy and demyelinating disease. There is no restricted diffusion or MRI evidence of an acute or subacute cerebral infarction. No abnormal paramagnetic signal in the  brain. There is no intracranial mass, hemorrhage, \"mass effect\" or midline shift. There is mucosal thickening in the maxillary and ethmoid sinuses suggesting acute or chronic sinusitis. Mastoids appear clear. 1. CEREBRAL ATROPHY AND AGE RELATED FINDINGS IN THE BRAIN. 2. NO RESTRICTED DIFFUSION OR MRI EVIDENCE OF AN ACUTE OR SUBACUTE CEREBRAL INFARCTION. 3. RELATIVELY EXTENSIVE PATCHY T2 HYPERINTENSE FOCI IN PERIVENTRICULAR WHITE MATTER OF BOTH CEREBRAL HEMISPHERES SIMILAR TO THE 1/22/2011 MRI BRAIN. DIFFERENTIAL CONSIDERATIONS INCLUDE A MANIFESTATION OF SMALL VESSEL DISEASE OR MICROANGIOPATHY AND DEMYELINATING DISEASE. 4. NO INTRACRANIAL MASS, HEMORRHAGE, \"MASS EFFECT\" OR MIDLINE SHIFT. Assessment and  plan:     1. TIA, right lower extremity weakness improved  2. Asthma with mild exacerbation  3. Shortness of breath secondary to above  4. Diabetes mellitus  5. Hypertension    She is Started on IV Solu-Medrol 40 mg every 8 hourly,  nebulizers which DuoNeb 4 times a day and albuterol every 2 hours when necessary. .  Pulmicort 0.5 mg twice a day. Chest x-ray was done before shows active disease. Mucinex 600 mg twice a day. Change Solu-Medrol to prednisone 40 mg daily from tomorrow. Patient advised to have a PFT done as outpatient, after discharge. Follow-up in office 2-4 weeks.     Thank you for consult    Electronically signed by Roshan Sofia MD, [facility] on 5/21/2019 at 7:49 PM

## 2019-05-21 NOTE — CARE COORDINATION
5/21/19 I MET WITH THE PT. SHE CONFIRMED HER DISCHARGE PALN IS [place] [place] AND SHE IS ALSO AWARE WE ARE STILL WAITING FOR [address] [facility]. SW COMPLETED THE [address]. UNSURE WHEN DISCHARGE THOUGH R/T NEW URINARY RETNETION/ HAS A PURE WICK 2000CC OUT/POST VOID 300 CC STAFF RN TOLD ME. ALSO C/O CONTINUOUS RLE PAIN.  ALSO TODAY SOB AND O2 2L N/C ON. MRI LUMBAR SPINE ORDERED.

## 2019-05-21 NOTE — PROGRESS NOTES
Neurology Daily Progress Note  Name: Kat Lopez  Age: 79 y.o. Gender: female  CodeStatus: Full Code  Allergies: No Known Allergies    Chief Complaint:Extremity Weakness (right leg weakness, woke up from a nap at 1730 today and right leg is weak)    Primary Care Provider: Humberto Nicole MD  InpatientTreatment Team: Treatment Team: Attending Provider: Nona Chauhan MD; Consulting Physician: Zeynep Brown MD; Consulting Physician: Chucky Ortiz DO; Registered Nurse: Cricket Dunham RN  Admission Date: 5/17/2019      HPI   Pt seen and examined for neuro follow up for RLE weakness. A&O x3, NAD, pleasant and cooperative. Pt still with some weakness to RLE strength 4/5. No paresthesia/numbness. Denies back pain. No radiculopathy. No new focal neuro deficits. Denies  Headache, double vision, blurry vision, difficulty with speech, difficulty with swallowing, numbness, pain, nausea, vomiting, choking, neck pain, dizziness. Pt with urinary retention last evening >2000cc. Now with external female catheter. Vitals:    05/21/19 0559   BP:    Pulse:    Resp:    Temp:    SpO2: 94%        Physical Exam   Constitutional: She is oriented to person, place, and time. She appears well-nourished. HENT:   Head: Normocephalic and atraumatic. Eyes: Pupils are equal, round, and reactive to light. EOM are normal.   Neck: Neck supple. No JVD present. Cardiovascular: Normal rate and regular rhythm. Pulmonary/Chest: Effort normal and breath sounds normal. No respiratory distress. Abdominal: Soft. Bowel sounds are normal. She exhibits no distension. There is no tenderness. Musculoskeletal: She exhibits no edema. Neurological: She is alert and oriented to person, place, and time. No cranial nerve deficit. Skin: Skin is warm and dry. She is not diaphoretic. Psychiatric: She has a normal mood and affect. Review of Systems   Constitutional: Negative for appetite change, chills, fatigue and fever.    HENT: Negative for hearing loss and trouble swallowing. Eyes: Negative for visual disturbance. Respiratory: Negative for cough, chest tightness, shortness of breath and wheezing. Cardiovascular: Negative for chest pain, palpitations and leg swelling. Gastrointestinal: Negative for abdominal distention, abdominal pain, constipation, diarrhea, nausea and vomiting. Genitourinary: Positive for difficulty urinating. Negative for dysuria, frequency and hematuria. Musculoskeletal: Positive for gait problem. Skin: Negative for color change and rash. Neurological: Positive for weakness (RLE). Negative for dizziness, tremors, seizures, syncope, facial asymmetry, speech difficulty, light-headedness, numbness and headaches. Psychiatric/Behavioral: Negative for agitation, confusion and hallucinations. The patient is not nervous/anxious.         Medications:  Reviewed    Infusion Medications:    dextrose       Scheduled Medications:    midazolam  4 mg Intravenous Once    fentanNYL  100 mcg Intravenous Once    sodium chloride bacteriostatic  30 mL Injection Once    insulin glargine  15 Units Subcutaneous BID    azithromycin  500 mg Oral Daily    insulin lispro  5 Units Subcutaneous TID WC    ciprofloxacin  500 mg Oral 2 times per day    ipratropium-albuterol  1 ampule Inhalation 4x daily    lisinopril  5 mg Oral Daily    insulin lispro  0-12 Units Subcutaneous TID WC    insulin lispro  0-6 Units Subcutaneous Nightly    guaiFENesin  600 mg Oral BID    sodium chloride flush  10 mL Intravenous 2 times per day    enoxaparin  40 mg Subcutaneous Daily    aspirin  81 mg Oral Daily    atorvastatin  40 mg Oral Nightly     PRN Meds: lidocaine viscous hcl, albuterol, glucose, dextrose, glucagon (rDNA), dextrose, sodium chloride flush, magnesium hydroxide, ondansetron, labetalol    Labs:   Recent Labs     05/19/19  0613 05/20/19  0532 05/21/19  0522   WBC 6.9 8.6 8.9   HGB 12.9 12.9 13.6   HCT 39.3 39.1 39.8  264 241     Recent Labs     05/19/19  0613 05/20/19  0532 05/21/19  0522    140 140   K 3.8 3.9 4.3    103 103   CO2 25 25 25   BUN 11 13 16   CREATININE 0.50 0.49* 0.58   CALCIUM 9.3 9.7 9.5     No results for input(s): AST, ALT, BILIDIR, BILITOT, ALKPHOS in the last 72 hours. Recent Labs     05/20/19  0532   INR 1.0     Recent Labs     05/18/19  1154   TROPONINI <0.010       Urinalysis:   Lab Results   Component Value Date    NITRU POSITIVE 05/17/2019    WBCUA  05/17/2019    BACTERIA MODERATE 05/17/2019    RBCUA 3-5 05/17/2019    BLOODU Negative 05/17/2019    SPECGRAV 1.038 05/17/2019    GLUCOSEU 250 05/17/2019       Radiology:   Most recent    Chest CT      WITH CONTRAST:No results found for this or any previous visit. WITHOUT CONTRAST: No results found for this or any previous visit. CXR      2-view:   Results for orders placed during the hospital encounter of 05/17/19   XR CHEST STANDARD (2 VW)    Narrative EXAMINATION: XR CHEST (2 VW)    CLINICAL HISTORY: COUGH    COMPARISONS: May 17, 2019. FINDINGS:    Osseous structures intact. Cardiopericardial silhouette normal. Pulmonary vasculature normal. Lungs clear. Impression No acute cardiopulmonary disease. Portable:   Results for orders placed during the hospital encounter of 05/17/19   XR CHEST PORTABLE    Narrative EXAMINATION: XR CHEST PORTABLE    CLINICAL HISTORY:   LOSS OF USE OF RIGHT LEG     COMPARISONS: NOVEMBER 24, 2013    FINDINGS: Osseous structures intact. Cardiopericardial silhouette normal. Pulmonary vasculature normal. Lungs clear. Impression NO ACUTE CARDIOPULMONARY DISEASE. Echo No results found for this or any previous visit.           Assessment/Plan:  RLE weakness  MRI brain no acute process, T2 hyperintense focus periventricular similar to 1/22/11  Carotid US <50% internal bilat, bilat plaque burden at carotid bifurcation,  TTE showed IAS aneurysm, tech difficulty bubble study  LEONID

## 2019-05-21 NOTE — FLOWSHEET NOTE
New orders placed, hospitalist will be in to discuss with pt.    Electronically signed by Donald Davis RN on 5/21/19 at 12:09 AM

## 2019-05-21 NOTE — FLOWSHEET NOTE
Dr Campos Cap in room to round on patient, she is c/o shortness of breath. PCA placed patient on 2Lo2 per NC and called respiratory for breathing treatment, will continue to monitor.

## 2019-05-21 NOTE — SIGNIFICANT EVENT
Notified of high volume post void residual bladder scan. Pt chart reviewed. LABS reviewed:  A/P:  1. Cystitis Klebsiella UTI present on admission with end organ dysfunction (Acute bladder dysfunction with urinary retention) however could also be neurogenic bladder in setting of poorly controlled diabetic: Will excalate Antibiotic from Macrobid to cipro as nitrofurantion does not penetrate renal parenchyma which is likely why she continues to have frequency urgency and cystitis with bladder dysfunction. Will order straight cath for now and if this occurs again, Consider placing indwelling andrews with trial of void recommended prior to discharge.  Needs tight control of glucose per guidelines goal is gluc less than 180.  2. DMII with hyperglycemia: ISS, hypoglycemia protocol POCT Glucose TIDAC & QHS  Electronically signed by Maribel Bowman MD on 5/20/2019 at 9:45 PM  I can be reached at [phone]  TTS 35mins

## 2019-05-21 NOTE — PROGRESS NOTES
Progress Note  Patient: Sirena Raymond  Unit/Bed: T544/H119-09  YOB: 1950  MRN: [id number]  Acct: [de-identified]   Admitting Diagnosis: TIA (transient ischemic attack) [G45.9]  TIA (transient ischemic attack) [G45.9]  Admit Date:  2019  Hospital Day: 2    Chief Complaint: TIA HTN Lipid    Histories:  Past Medical History:   Diagnosis Date    Asthma     Bladder infection     Chicken pox     Chronic back pain     Hypertension     Measles     Mumps     Osteoarthritis     Pneumonia     Whooping cough      Past Surgical History:   Procedure Laterality Date    APPENDECTOMY  1978    HYSTERECTOMY  26    TONSILLECTOMY  1966     Family History   Problem Relation Age of Onset    Cancer Mother     Cancer Father      Social History     Socioeconomic History    Marital status:      Spouse name: None    Number of children: None    Years of education: None    Highest education level: None   Occupational History    None   Social Needs    Financial resource strain: None    Food insecurity:     Worry: None     Inability: None    Transportation needs:     Medical: None     Non-medical: None   Tobacco Use    Smoking status: Former Smoker     Last attempt to quit: 10/19/2009     Years since quittin.5    Smokeless tobacco: Never Used   Substance and Sexual Activity    Alcohol use: No     Comment: recovering alcoholic    Drug use: Never    Sexual activity: None   Lifestyle    Physical activity:     Days per week: None     Minutes per session: None    Stress: None   Relationships    Social connections:     Talks on phone: None     Gets together: None     Attends [employer] service: None     Active member of club or organization: None     Attends meetings of clubs or organizations: None     Relationship status: None    Intimate partner violence:     Fear of current or ex partner: None     Emotionally abused: None     Physically abused: None     Forced sexual activity: None   Other Topics Concern    None   Social History Narrative    None       Subjective/HPI sob and wheezing. Ate ok. No cp     EKG: SR        Review of Systems:   Review of Systems   Constitutional: Negative. Negative for diaphoresis and fatigue. HENT: Negative. Eyes: Negative. Respiratory: Positive for shortness of breath and wheezing. Negative for cough, chest tightness and stridor. Cardiovascular: Negative. Negative for chest pain, palpitations and leg swelling. Gastrointestinal: Negative. Negative for blood in stool and nausea. Genitourinary: Negative. Musculoskeletal: Negative. Skin: Negative. Neurological: Positive for weakness. Negative for dizziness, syncope and light-headedness. Hematological: Negative. Psychiatric/Behavioral: Negative. Physical Examination:    BP (!) 142/71   Pulse 96   Temp 97.9 °F (36.6 °C) (Oral)   Resp 18   Ht 5' 3\" (1.6 m)   Wt 195 lb (88.5 kg)   LMP  (LMP Unknown)   SpO2 96%   BMI 34.54 kg/m²    Physical Exam   Constitutional: She appears healthy. No distress. HENT:   Normal cephalic and Atraumatic   Eyes: Pupils are equal, round, and reactive to light. Neck: Normal range of motion and thyroid normal. Neck supple. No JVD present. No neck adenopathy. No thyromegaly present. Cardiovascular: Normal rate, regular rhythm, intact distal pulses and normal pulses. Murmur heard. Pulmonary/Chest: Effort normal and breath sounds normal. She has no wheezes. She has no rales. She exhibits no tenderness. Abdominal: Soft. Bowel sounds are normal. There is no tenderness. Musculoskeletal: Normal range of motion. She exhibits no edema or tenderness. Neurological: She is alert and oriented to person, place, and time. Skin: Skin is warm. No cyanosis. Nails show no clubbing.        LABS:  CBC:   Lab Results   Component Value Date    WBC 8.9 05/21/2019    RBC 4.46 05/21/2019    HGB 13.6 05/21/2019    HCT 39.8 05/21/2019    MCV 89.4 05/21/2019    MCH 30.5 05/21/2019    MCHC 34.1 05/21/2019    RDW 14.0 05/21/2019     05/21/2019    MPV 9.0 08/29/2015     CBC with Differential:    Lab Results   Component Value Date    WBC 8.9 05/21/2019    RBC 4.46 05/21/2019    HGB 13.6 05/21/2019    HCT 39.8 05/21/2019     05/21/2019    MCV 89.4 05/21/2019    MCH 30.5 05/21/2019    MCHC 34.1 05/21/2019    RDW 14.0 05/21/2019    LYMPHOPCT 27.0 05/17/2019    MONOPCT 4.9 05/17/2019    BASOPCT 0.6 05/17/2019    MONOSABS 0.5 05/17/2019    LYMPHSABS 2.5 05/17/2019    EOSABS 0.5 05/17/2019    BASOSABS 0.1 05/17/2019     CMP:    Lab Results   Component Value Date     05/21/2019    K 4.3 05/21/2019     05/21/2019    CO2 25 05/21/2019    BUN 16 05/21/2019    CREATININE 0.58 05/21/2019    GFRAA >60.0 05/21/2019    LABGLOM >60.0 05/21/2019    GLUCOSE 275 05/21/2019    PROT 7.2 05/17/2019    LABALBU 4.1 05/17/2019    CALCIUM 9.5 05/21/2019    BILITOT 0.3 05/17/2019    ALKPHOS 89 05/17/2019    AST 15 05/17/2019    ALT 12 05/17/2019     BMP:    Lab Results   Component Value Date     05/21/2019    K 4.3 05/21/2019     05/21/2019    CO2 25 05/21/2019    BUN 16 05/21/2019    LABALBU 4.1 05/17/2019    CREATININE 0.58 05/21/2019    CALCIUM 9.5 05/21/2019    GFRAA >60.0 05/21/2019    LABGLOM >60.0 05/21/2019    GLUCOSE 275 05/21/2019     Magnesium:    Lab Results   Component Value Date    MG 1.8 11/24/2013     Troponin:    Lab Results   Component Value Date    TROPONINI <0.010 05/18/2019        Active Hospital Problems    Diagnosis Date Noted    Cerebrovascular accident (CVA) due to occlusion of small artery [I63.81]      Priority: High    TIA (transient ischemic attack) [G45.9] 05/17/2019     Priority: Low        Assessment/Plan:  1. TIA  2. LEONID no PFO nor Thrombus . LVEF 55% Mild MR  3.  Will sign off- f/u Dr. Modesto Ko 2-3 weeks       Electronically signed by Prerna Patel MD on 5/21/2019 at 10:48 AM

## 2019-05-21 NOTE — PROGRESS NOTES
Physical Therapy Missed Treatment   Facility/Department: [facility] MED SURG N225/N225-01    NAME: Royal Khan    : 1950 (79 y.o.)  MRN: [id number]    Account: [de-identified]  Gender: female    Chart reviewed, attempted PT at 15:39. Patient unavailable 2° to:    [x] Hold per nsg request pt with increased SOB needing to be placed on O2 Rn requests we wait until tomorrow to check on pt. [] Pt declined     [] Pt. . off floor for test/procedure. [] Pt. Unavailable       Will attempt PT treatment again at earliest convenience.       Electronically signed by Carrol Osborn PTA on 19 at 3:39 PM

## 2019-05-21 NOTE — PROGRESS NOTES
Stevenson Weston is a 79 y.o. female patient.  Pt was seen and evaluated, short of breath    Current Facility-Administered Medications   Medication Dose Route Frequency Provider Last Rate Last Dose    methylPREDNISolone sodium (SOLU-MEDROL) injection 40 mg  40 mg Intravenous Q8H Dee Wagner MD   40 mg at 05/21/19 1342    insulin lispro (HUMALOG) injection vial 10 Units  10 Units Subcutaneous TID WC Dee Wagner MD   10 Units at 05/21/19 1232    budesonide (PULMICORT) nebulizer suspension 500 mcg  0.5 mg Nebulization BID Dee Wagner MD        LORazepam (ATIVAN) injection 1 mg  1 mg Intravenous Once PRN MAJOR Cotton - CNP        lidocaine viscous hcl (XYLOCAINE) 2 % solution 15 mL  15 mL Mouth/Throat Q3H PRN Daryn Aguilar MD        midazolam (VERSED) injection 4 mg  4 mg Intravenous Once Daryn Aguilar MD        fentaNYL (SUBLIMAZE) injection 100 mcg  100 mcg Intravenous Once Daryn Aguilar MD        sodium chloride bacteriostatic 0.9 % injection 30 mL  30 mL Injection Once Daryn Aguilar MD        insulin glargine (LANTUS) injection vial 15 Units  15 Units Subcutaneous BID Dee Wagner MD   15 Units at 05/21/19 0928    azithromycin (ZITHROMAX) tablet 500 mg  500 mg Oral Daily Dee Wagner MD   500 mg at 05/21/19 0925    ciprofloxacin (CIPRO) tablet 500 mg  500 mg Oral 2 times per day Radha Eastman MD   500 mg at 05/21/19 0925    albuterol (PROVENTIL) nebulizer solution 2.5 mg  2.5 mg Nebulization Q2H PRN Aleatha Pool Sedar, DO   2.5 mg at 05/19/19 0231    ipratropium-albuterol (DUONEB) nebulizer solution 1 ampule  1 ampule Inhalation 4x daily Alejandra Gallagher MD   1 ampule at 05/21/19 1055    lisinopril (PRINIVIL;ZESTRIL) tablet 5 mg  5 mg Oral Daily Troy J Holiday, DO   5 mg at 05/21/19 0926    glucose (GLUTOSE) 40 % oral gel 15 g  15 g Oral PRN Aleatha Pool Sedar, DO        dextrose 50 % solution 12.5 g  12.5 g Intravenous PRN Aleatha Pool Sedar, DO        glucagon (rDNA) injection 1 mg  1 mg comfortable. Vital signs: (most recent): Blood pressure (!) 152/81, pulse 95, temperature 97.9 °F (36.6 °C), temperature source Oral, resp. rate 16, height 5' 3\" (1.6 m), weight 195 lb (88.5 kg), SpO2 99 %. HEENT: Normal HEENT exam.    Lungs:  (+ wheezing, decrease BS)  Heart: S1 normal and S2 normal.    Abdomen: Abdomen is soft. Bowel sounds are normal.   There is no epigastric area or suprapubic area tenderness. Pulses: Distal pulses are intact. Neurological: Patient is alert and oriented to person, place and time. Pupils:  Pupils are equal, round, and reactive to light. Skin:  Warm and dry.       Lab Results   Component Value Date    WBC 8.9 05/21/2019    HGB 13.6 05/21/2019    HCT 39.8 05/21/2019    MCV 89.4 05/21/2019     05/21/2019     Lab Results   Component Value Date     05/21/2019    K 4.3 05/21/2019     05/21/2019    CO2 25 05/21/2019    BUN 16 05/21/2019    CREATININE 0.58 05/21/2019    GLUCOSE 275 05/21/2019    CALCIUM 9.5 05/21/2019        Assessment & Plan   copd exacerbation  Added Iv steroids  pulm eval  duonebs  Azithromycin PO  2) RLE weakness better, TIA  LEONID negative for thrombus and vegetation     UTI, Klebselia  - urine culture positive for GNB, continue ciprp  NIDDM2 with hyperglycemia  Increase insulin with meals     4) acute urinary re tension   resovled      Sondra Echols MD  5/21/2019

## 2019-05-22 ENCOUNTER — APPOINTMENT (OUTPATIENT)
Dept: MRI IMAGING | Age: 69
DRG: 191 | End: 2019-05-22
Payer: MEDICARE

## 2019-05-22 PROBLEM — I65.22 CAROTID ARTERY PLAQUE, LEFT: Status: ACTIVE | Noted: 2017-12-02

## 2019-05-22 PROBLEM — E11.9 TYPE 2 DIABETES MELLITUS WITHOUT COMPLICATION, WITH LONG-TERM CURRENT USE OF INSULIN (HCC): Status: ACTIVE | Noted: 2017-02-22

## 2019-05-22 PROBLEM — J45.20 MILD INTERMITTENT ASTHMA WITHOUT COMPLICATION: Status: ACTIVE | Noted: 2017-12-02

## 2019-05-22 PROBLEM — I63.9 CEREBROVASCULAR ACCIDENT (CVA) DETERMINED BY CLINICAL ASSESSMENT (HCC): Status: ACTIVE | Noted: 2019-05-22

## 2019-05-22 PROBLEM — M21.371 RIGHT FOOT DROP: Status: ACTIVE | Noted: 2019-02-15

## 2019-05-22 PROBLEM — R29.898 RIGHT LEG WEAKNESS: Status: ACTIVE | Noted: 2019-05-22

## 2019-05-22 PROBLEM — M48.02 CERVICAL SPINAL STENOSIS: Status: ACTIVE | Noted: 2017-12-02

## 2019-05-22 PROBLEM — E11.65 TYPE 2 DIABETES MELLITUS WITH HYPERGLYCEMIA, WITH LONG-TERM CURRENT USE OF INSULIN (HCC): Status: ACTIVE | Noted: 2019-05-22

## 2019-05-22 PROBLEM — J44.9 CHRONIC OBSTRUCTIVE PULMONARY DISEASE (HCC): Status: ACTIVE | Noted: 2018-07-09

## 2019-05-22 PROBLEM — R32 URINARY INCONTINENCE: Status: ACTIVE | Noted: 2019-05-22

## 2019-05-22 PROBLEM — E11.59 TYPE 2 DIABETES MELLITUS WITH VASCULAR DISEASE (HCC): Status: ACTIVE | Noted: 2017-12-02

## 2019-05-22 PROBLEM — E78.2 MIXED HYPERLIPIDEMIA: Status: ACTIVE | Noted: 2018-08-01

## 2019-05-22 PROBLEM — M54.12 CERVICAL RADICULAR PAIN: Status: ACTIVE | Noted: 2017-01-20

## 2019-05-22 PROBLEM — Z79.4 TYPE 2 DIABETES MELLITUS WITHOUT COMPLICATION, WITH LONG-TERM CURRENT USE OF INSULIN (HCC): Status: ACTIVE | Noted: 2017-02-22

## 2019-05-22 PROBLEM — I95.1 ORTHOSTATIC HYPOTENSION: Status: ACTIVE | Noted: 2017-12-02

## 2019-05-22 PROBLEM — I67.82 TEMPORARY CEREBRAL VASCULAR DYSFUNCTION: Status: ACTIVE | Noted: 2017-12-02

## 2019-05-22 PROBLEM — I10 ESSENTIAL HYPERTENSION: Status: ACTIVE | Noted: 2017-01-20

## 2019-05-22 PROBLEM — Z79.4 TYPE 2 DIABETES MELLITUS WITH HYPERGLYCEMIA, WITH LONG-TERM CURRENT USE OF INSULIN (HCC): Status: ACTIVE | Noted: 2019-05-22

## 2019-05-22 LAB
ANION GAP SERPL CALCULATED.3IONS-SCNC: 11 MEQ/L (ref 9–15)
BUN BLDV-MCNC: 15 MG/DL (ref 8–23)
CALCIUM SERPL-MCNC: 9.2 MG/DL (ref 8.5–9.9)
CHLORIDE BLD-SCNC: 102 MEQ/L (ref 95–107)
CO2: 24 MEQ/L (ref 20–31)
CREAT SERPL-MCNC: 0.55 MG/DL (ref 0.5–0.9)
GFR AFRICAN AMERICAN: >60
GFR NON-AFRICAN AMERICAN: >60
GLUCOSE BLD-MCNC: 141 MG/DL (ref 60–115)
GLUCOSE BLD-MCNC: 150 MG/DL (ref 60–115)
GLUCOSE BLD-MCNC: 177 MG/DL (ref 60–115)
GLUCOSE BLD-MCNC: 269 MG/DL (ref 60–115)
GLUCOSE BLD-MCNC: 343 MG/DL (ref 70–99)
HCT VFR BLD CALC: 40 % (ref 37–47)
HEMOGLOBIN: 13.7 G/DL (ref 12–16)
MCH RBC QN AUTO: 30.7 PG (ref 27–31.3)
MCHC RBC AUTO-ENTMCNC: 34.2 % (ref 33–37)
MCV RBC AUTO: 89.6 FL (ref 82–100)
PDW BLD-RTO: 13.6 % (ref 11.5–14.5)
PERFORMED ON: ABNORMAL
PLATELET # BLD: 280 K/UL (ref 130–400)
POTASSIUM SERPL-SCNC: 4.7 MEQ/L (ref 3.4–4.9)
RBC # BLD: 4.46 M/UL (ref 4.2–5.4)
SODIUM BLD-SCNC: 137 MEQ/L (ref 135–144)
WBC # BLD: 10.1 K/UL (ref 4.8–10.8)

## 2019-05-22 PROCEDURE — 2700000000 HC OXYGEN THERAPY PER DAY

## 2019-05-22 PROCEDURE — 99232 SBSQ HOSP IP/OBS MODERATE 35: CPT | Performed by: INTERNAL MEDICINE

## 2019-05-22 PROCEDURE — 6370000000 HC RX 637 (ALT 250 FOR IP): Performed by: INTERNAL MEDICINE

## 2019-05-22 PROCEDURE — 94640 AIRWAY INHALATION TREATMENT: CPT

## 2019-05-22 PROCEDURE — 80048 BASIC METABOLIC PNL TOTAL CA: CPT

## 2019-05-22 PROCEDURE — 72156 MRI NECK SPINE W/O & W/DYE: CPT

## 2019-05-22 PROCEDURE — 97116 GAIT TRAINING THERAPY: CPT

## 2019-05-22 PROCEDURE — 6360000002 HC RX W HCPCS: Performed by: INTERNAL MEDICINE

## 2019-05-22 PROCEDURE — 99232 SBSQ HOSP IP/OBS MODERATE 35: CPT | Performed by: PHYSICAL MEDICINE & REHABILITATION

## 2019-05-22 PROCEDURE — A9579 GAD-BASE MR CONTRAST NOS,1ML: HCPCS | Performed by: PSYCHIATRY & NEUROLOGY

## 2019-05-22 PROCEDURE — 97110 THERAPEUTIC EXERCISES: CPT

## 2019-05-22 PROCEDURE — 1210000000 HC MED SURG R&B

## 2019-05-22 PROCEDURE — 94664 DEMO&/EVAL PT USE INHALER: CPT

## 2019-05-22 PROCEDURE — 72157 MRI CHEST SPINE W/O & W/DYE: CPT

## 2019-05-22 PROCEDURE — 97535 SELF CARE MNGMENT TRAINING: CPT

## 2019-05-22 PROCEDURE — 6360000004 HC RX CONTRAST MEDICATION: Performed by: PSYCHIATRY & NEUROLOGY

## 2019-05-22 PROCEDURE — 36415 COLL VENOUS BLD VENIPUNCTURE: CPT

## 2019-05-22 PROCEDURE — 85027 COMPLETE CBC AUTOMATED: CPT

## 2019-05-22 RX ORDER — OXYBUTYNIN CHLORIDE 5 MG/1
5 TABLET ORAL 3 TIMES DAILY
Status: DISCONTINUED | OUTPATIENT
Start: 2019-05-22 | End: 2019-05-25

## 2019-05-22 RX ORDER — IPRATROPIUM BROMIDE AND ALBUTEROL SULFATE 2.5; .5 MG/3ML; MG/3ML
1 SOLUTION RESPIRATORY (INHALATION) 3 TIMES DAILY
Status: DISCONTINUED | OUTPATIENT
Start: 2019-05-22 | End: 2019-05-25

## 2019-05-22 RX ADMIN — BUDESONIDE 500 MCG: 0.5 INHALANT RESPIRATORY (INHALATION) at 09:00

## 2019-05-22 RX ADMIN — INSULIN GLARGINE 15 UNITS: 100 INJECTION, SOLUTION SUBCUTANEOUS at 08:49

## 2019-05-22 RX ADMIN — OXYBUTYNIN CHLORIDE 5 MG: 5 TABLET ORAL at 13:18

## 2019-05-22 RX ADMIN — AZITHROMYCIN MONOHYDRATE 500 MG: 250 TABLET ORAL at 08:50

## 2019-05-22 RX ADMIN — GADOTERIDOL 20 ML: 279.3 INJECTION, SOLUTION INTRAVENOUS at 12:17

## 2019-05-22 RX ADMIN — GUAIFENESIN 600 MG: 600 TABLET, EXTENDED RELEASE ORAL at 20:46

## 2019-05-22 RX ADMIN — INSULIN LISPRO 2 UNITS: 100 INJECTION, SOLUTION INTRAVENOUS; SUBCUTANEOUS at 13:17

## 2019-05-22 RX ADMIN — ASPIRIN 81 MG: 81 TABLET, COATED ORAL at 08:50

## 2019-05-22 RX ADMIN — LISINOPRIL 5 MG: 5 TABLET ORAL at 08:50

## 2019-05-22 RX ADMIN — BUDESONIDE 500 MCG: 0.5 INHALANT RESPIRATORY (INHALATION) at 20:07

## 2019-05-22 RX ADMIN — CIPROFLOXACIN HYDROCHLORIDE 500 MG: 500 TABLET, FILM COATED ORAL at 20:48

## 2019-05-22 RX ADMIN — INSULIN GLARGINE 15 UNITS: 100 INJECTION, SOLUTION SUBCUTANEOUS at 20:47

## 2019-05-22 RX ADMIN — PREDNISONE 20 MG: 20 TABLET ORAL at 08:50

## 2019-05-22 RX ADMIN — INSULIN LISPRO 2 UNITS: 100 INJECTION, SOLUTION INTRAVENOUS; SUBCUTANEOUS at 16:40

## 2019-05-22 RX ADMIN — IPRATROPIUM BROMIDE AND ALBUTEROL SULFATE 1 AMPULE: 2.5; .5 SOLUTION RESPIRATORY (INHALATION) at 13:56

## 2019-05-22 RX ADMIN — ATORVASTATIN CALCIUM 40 MG: 40 TABLET, FILM COATED ORAL at 20:46

## 2019-05-22 RX ADMIN — OXYBUTYNIN CHLORIDE 5 MG: 5 TABLET ORAL at 20:46

## 2019-05-22 RX ADMIN — ENOXAPARIN SODIUM 40 MG: 40 INJECTION SUBCUTANEOUS at 08:49

## 2019-05-22 RX ADMIN — CIPROFLOXACIN HYDROCHLORIDE 500 MG: 500 TABLET, FILM COATED ORAL at 08:50

## 2019-05-22 RX ADMIN — IPRATROPIUM BROMIDE AND ALBUTEROL SULFATE 1 AMPULE: 2.5; .5 SOLUTION RESPIRATORY (INHALATION) at 20:07

## 2019-05-22 RX ADMIN — GUAIFENESIN 600 MG: 600 TABLET, EXTENDED RELEASE ORAL at 08:50

## 2019-05-22 RX ADMIN — INSULIN LISPRO 6 UNITS: 100 INJECTION, SOLUTION INTRAVENOUS; SUBCUTANEOUS at 08:49

## 2019-05-22 RX ADMIN — IPRATROPIUM BROMIDE AND ALBUTEROL SULFATE 1 AMPULE: 2.5; .5 SOLUTION RESPIRATORY (INHALATION) at 09:00

## 2019-05-22 ASSESSMENT — ENCOUNTER SYMPTOMS
TROUBLE SWALLOWING: 0
COUGH: 0
BACK PAIN: 0
NAUSEA: 0
WHEEZING: 0
VOMITING: 0
COLOR CHANGE: 0
CHEST TIGHTNESS: 0
DIARRHEA: 0
SHORTNESS OF BREATH: 0
BACK PAIN: 1

## 2019-05-22 NOTE — PROGRESS NOTES
Physical Therapy Med Surg Daily Treatment Note  Facility/Department: [address] NEURO  Room: [facility]/[place] [address]       NAME: Mich Nicholas  : 1950 (79 y.o.)  MRN: [id number]  CODE STATUS: Full Code    Date of Service: 2019    Patient Diagnosis(es): TIA (transient ischemic attack) [G45.9]  TIA (transient ischemic attack) [G45.9]   Chief Complaint   Patient presents with    Extremity Weakness     right leg weakness, woke up from a nap at 1730 today and right leg is weak     Patient Active Problem List    Diagnosis Date Noted    Cerebrovascular accident (CVA) due to occlusion of small artery      Priority: High    Cerebrovascular accident (CVA) determined by clinical assessment likely ALEXANDER with Right kip ([address] 2019    Urinary incontinence 2019    Right leg weakness 2019    Type 2 diabetes mellitus with hyperglycemia, with long-term current use of insulin ([address] 2019    Mild intermittent asthma with exacerbation     Shortness of breath     TIA (transient ischemic attack) 2019    Right foot drop 02/15/2019    Mixed hyperlipidemia 2018    Chronic obstructive pulmonary disease ([address] 2018    Carotid artery plaque, left 2017    Cervical spinal stenosis 2017    Orthostatic hypotension 2017    Mild intermittent asthma without complication     Temporary cerebral vascular dysfunction 2017    Type 2 diabetes mellitus with vascular disease ([address] 2017    Type 2 diabetes mellitus without complication, with long-term current use of insulin ([address] 2017    Cervical radicular pain 2017    Essential hypertension 2017    Lumbosacral spondylosis without myelopathy 2015    Cervical spondylosis without myelopathy 2015    Lumbar and sacral osteoarthritis 2015    Cervical spondylosis with myelopathy 2007        Past Medical History:   Diagnosis Date    Asthma     Bladder infection     Chicken pox I  Long term goal 4: amb 50ft with LRAD and mod I  Patient Goals   Patient goals : \"walk better, go home\"    PLAN:   Plan  Times per week: 3-6  Current Treatment Recommendations: Strengthening, ROM, Functional Mobility Training, Neuromuscular Re-education, Manual Therapy - Joint Manipulation, Home Exercise Program, Equipment Evaluation, Education, & procurement, Modalities, Manual Therapy - Soft Tissue Mobilization, Gait Training, Transfer Training, Balance Training, Endurance Training, Stair training, Pain Management, Patient/Caregiver Education & Training, Safety Education & Training, Positioning  Safety Devices  Type of devices:  All fall risk precautions in place, Left in chair, Chair alarm in place     [facility] (6 CLICK) Sydney Juarez 28 Inpatient Mobility Raw Score : 16     Therapy Time   Individual   Time In 0907   Time Out 0930   Minutes 23      Gait: 10  Therex: 8  Trsf: 5        Agnes Aguilera PTA, 05/22/19 at 9:47 AM

## 2019-05-22 NOTE — PROGRESS NOTES
[facility] Occupational Therapy      Date: 2019  Patient Name: Alejandra Cortes        MRN: [id number]  Account: [de-identified]   : 1950  (79 y.o.)  Room: [place] [address]    Chart reviewed, attempted OT at 11:10 for tx update. Patient not seen 2° to:    [] Hold per nsg request    [] Pt declined     [x] Pt. off floor for test/procedure. MRI  Spoke to NEY Valera RN aware. Will attempt again when able.     Electronically signed by ELLE Farris/L on 2019 at 11:14 AM

## 2019-05-22 NOTE — PROGRESS NOTES
INPATIENT PROGRESS NOTES    PATIENT NAME: Efrem Reina  MRN: [id number]  SERVICE DATE:  May 22, 2019   SERVICE TIME:  10:47 AM      PRIMARY SERVICE: Pulmonary Disease    CHIEF COMPLAIN:Shortness of breath      INTERVAL HPI: Patient seen and examined at bedside, Interval Notes, orders reviewed. Nursing notes noted  C/o shortness of breath but less than yesterday, wheezing is better today. No cough, No sputum. No chest pain or pleuritic pain . No fever or chills . No hemoptysis  No Nausea, Vomiting or Diarrhea. She still has leg weakness       OBJECTIVE    Body mass index is 34.54 kg/m². PHYSICAL EXAM:  Vitals:  /70   Pulse 91   Temp 97.9 °F (36.6 °C)   Resp 16   Ht 5' 3\" (1.6 m)   Wt 195 lb (88.5 kg)   LMP  (LMP Unknown)   SpO2 100%   BMI 34.54 kg/m²   General: Alert, awake . comfortable in bed, No distress. appears stated age and cooperative  Head: Atraumatic , Normocephalic   Eyes: PERRL. No sclera icterus. No conjunctival injection. No discharge   ENT: No nasal  discharge. Pharynx clear. lips, teeth, mucosa and gums are normal, tongue protrudes in the midline  Neck:  Trachea midline. No thyromegaly, no JVD, No cervical adenopathy. Chest : Bilaterally symmetrical ,Normal effort,  No accessory muscle use  Lung : . Fair BS bilateral, decreased BS at bases. No Rales. Few scattered wheezing. No rhonchi. No dullness on percussion. Heart[de-identified] Normal  rate. Regular rhythm. No mumur ,  Rub or gallop  ABD: Non-tender. Non-distended. No masses. No organmegaly. Normal bowel sounds. No hernia. Ext : No Pitting both leg , No Cyanosis No clubbing, rt.  Leg weakness          DATA:   Recent Labs     05/21/19 0522 05/22/19  0525   WBC 8.9 10.1   HGB 13.6 13.7   HCT 39.8 40.0   MCV 89.4 89.6    280     Recent Labs     05/21/19 0522 05/22/19  0525    137   K 4.3 4.7    102   CO2 25 24   BUN 16 15   CREATININE 0.58 0.55   GLUCOSE 275* 343*   CALCIUM 9.5 9.2   LABGLOM >60.0 >60.0   GFRAA >60.0 >60.0 MV Settings:          No results for input(s): PHART, UOM1QYM, PO2ART, QMV0TLO, BEART, X8PAEFUT in the last 72 hours. O2 Device: Nasal cannula  O2 Flow Rate (L/min): 2 L/min    DIET CARDIAC; Carb Control: 3 carb choices (45 gms)/meal     MEDICATIONS during current hospitalization:    Continuous Infusions:   dextrose         Scheduled Meds:   ipratropium-albuterol  1 ampule Inhalation TID    oxybutynin  5 mg Oral TID    insulin lispro  10 Units Subcutaneous TID WC    budesonide  0.5 mg Nebulization BID    predniSONE  20 mg Oral Daily    Followed by   Keegan Sainz ON 5/24/2019] predniSONE  15 mg Oral Daily    Followed by   Keegan Sainz ON 5/27/2019] predniSONE  10 mg Oral Daily    Followed by   Keegan Sainz ON 5/30/2019] predniSONE  5 mg Oral Daily    midazolam  4 mg Intravenous Once    fentanNYL  100 mcg Intravenous Once    sodium chloride bacteriostatic  30 mL Injection Once    insulin glargine  15 Units Subcutaneous BID    azithromycin  500 mg Oral Daily    ciprofloxacin  500 mg Oral 2 times per day    lisinopril  5 mg Oral Daily    insulin lispro  0-12 Units Subcutaneous TID WC    insulin lispro  0-6 Units Subcutaneous Nightly    guaiFENesin  600 mg Oral BID    sodium chloride flush  10 mL Intravenous 2 times per day    enoxaparin  40 mg Subcutaneous Daily    aspirin  81 mg Oral Daily    atorvastatin  40 mg Oral Nightly       PRN Meds:lidocaine viscous hcl, albuterol, glucose, dextrose, glucagon (rDNA), dextrose, sodium chloride flush, magnesium hydroxide, ondansetron, labetalol    Radiology  Cta Head W Wo Contrast    Result Date: 5/18/2019  CTA head with intravenous contrast medium. HISTORY: Inability to use left leg. Technical factors: CTA head imaging formatted as contiguous axial images through skull base. Sagittal, coronal, right and left oblique, 3-D MIP obtained during postprocessing. Intravenous contrast medium consisting of Isovue-370, 100 mL utilized.  Study done in conjunction with CTA neck reported separately. No prior CT head available for comparison. FINDINGS: Bilateral A1 and A2 segments, anterior carotid arteries patent. Anterior communicating artery patent. Communicating segments bilateral internal carotid arteries Bilateral M1 and M2 segments middle cerebral arteries patent. Congenital absence, bilateral communicating arteries. Bilateral P1, P2 segments, posterior cerebral arteries patent. Basilar tip and basilar artery patent. No aneurysm, or obstruction identified. Negative CTA head. All CT scans at this facility use dose modulation, iterative reconstruction, and/or weight based dosing when appropriate to reduce radiation dose to as low as reasonably achievable. Xr Chest Standard (2 Vw)    Result Date: 5/18/2019  EXAMINATION: XR CHEST (2 VW) CLINICAL HISTORY: COUGH COMPARISONS: May 17, 2019. FINDINGS: Osseous structures intact. Cardiopericardial silhouette normal. Pulmonary vasculature normal. Lungs clear. No acute cardiopulmonary disease. Ct Head Wo Contrast    Result Date: 5/17/2019  CT HEAD WO CONTRAST : 5/17/2019 CLINICAL HISTORY:  Loss of use of right leg . COMPARISON: Head MRI 6/22/2011. TECHNIQUE: Spiral unenhanced images were obtained of the head, with routine reconstructions performed. All CT scans at this facility use dose modulation, iterative reconstruction, and/or weight based dosing when appropriate to reduce radiation dose to as low as reasonably achievable. FINDINGS: There is no intracranial hemorrhage, mass effect, midline shift, extra-axial collection, evidence of hydrocephalus, recent ischemic infarct, or skull fracture identified. Mild to moderate atrophic changes have not significantly changed from 6/22/2011. The mastoid air cells and visualized paranasal sinuses are essentially clear. NO ACUTE INTRACRANIAL PROCESS IDENTIFIED. Cta Neck W Wo Contrast    Result Date: 5/18/2019  CTA neck with intravenous contrast medium.  HISTORY: Unable to move right leg. Technical factors: CTA neck obtained and formatted as contiguous axial images from aortic arch to skull base. Sagittal, coronal, overlap, right and left anterior oblique, and 3-D MIP reconstruction obtained during postprocessing. Intravenous contrast medium, consisting of Isovue-370, 100 mL identified. Study is done in conjunction with CTA head, reported separately. No prior CTA neck images available for comparison. FINDINGS: Right carotid: Right common carotid artery arises from brachiocephalic trunk. Ostium patent. Course and caliber right common carotid artery without anomaly. Carotid bifurcation without anomaly. Cervical segment patent but tortuous. Petrous, lacerum, clinoid, ophthalmic, cavernous, and communicating segments patent. Left carotid: Left common carotid artery arises from aortic arch. Ostium patent. Course and caliber left common carotid artery without anomaly. Calcification identified carotid bulb yielding 60 % stenosis by NASCET criteria. Remainder cervical segment, as well as petrous, lacerum, clinoid, ophthalmic, cavernous, and communicating segments patent. Right vertebral: Right vertebral artery arises from right subclavian artery. Pre foraminal, foraminal, extradural, and intradural segments patent. Left vertebral: Left vertebral artery arises from left subclavian artery. Pre foraminal, foraminal, extradural, and intradural segments patent. 60 % stenosis left internal carotid artery by NASCET criteria. No obstruction or dissection identified. All CT scans at this facility use dose modulation, iterative reconstruction, and/or weight based dosing when appropriate to reduce radiation dose to as low as reasonably achievable. Mri Lumbar Spine Wo Contrast    Result Date: 5/22/2019  EXAMINATION: MRI LUMBAR SPINE WO CONTRAST: DATE AND TIME: 5/21/2019 at 10:00 AM. CLINICAL HISTORY: LOWER BACK PAIN. WEAKNESS, EXTREMITY (PARAPLEGIA). COMPARISON: None.  TECHNIQUE:  Multi-sequence, multiplanar imaging of the lumbar spine was performed. No gadolinium contrast administered. VOLUME: None. MR CONTRAST: None. FINDINGS: Vertebrae: No acute fracture. Marrow signal changes are within normal limits. Conus: The conus medullaris ends at L1 level and is unremarkable. Disc bulging at T11-T12. T12/L1: There is no evidence of disc bulging or disc herniation. No significant facet hypertrophy or thickening of ligamenta flava. There is no central spinal canal stenosis. There is no neural foraminal stenosis. L1/L2:  There is no evidence of disc bulging or disc herniation. No significant facet hypertrophy or thickening of ligamenta flava. There is no central spinal canal stenosis. There is no neural foraminal stenosis. L2/L3:  Mild disc bulging with ventral ridging on the thecal sac. Mild facet arthropathy. No central canal or foraminal narrowing. L3/L4:  Mild disc space narrowing with broad-based disc bulging and moderate facet arthropathy. Thickening of the ligamenta flava. Findings resulting in mild spinal canal narrowing. No foraminal stenosis. L4/L5:  Broad-based disc bulging and osteophytic spurring off the endplates resulting in ventral ridging on the thecal sac. Changes asymmetric to the right with resulting right foraminal stenosis and compression on the exiting right L4 nerve root. Facet arthropathy combining with disc disease with borderline canal narrowing. No left foraminal stenosis. L5/S1: Broad-based disc bulging/protrusion with osteophytic spurring. Facet hypertrophy. Borderline canal narrowing. There is bilateral foraminal narrowing, left greater than right. Retroperitoneum: No abnormality of the visualized aorta or retroperitoneum. MULTILEVEL DISC DISEASE WITH MILD CANAL NARROWING AT L3-L4. RIGHT L4-L5 FORAMINAL STENOSIS SECONDARY TO DISC OSTEOPHYTE CHANGES/PROTRUSION. L5-S1 MILD FORAMINAL NARROWING, LEFT GREATER THAN RIGHT.      Xr Chest Portable    Result Date: mellitus  5. Hypertension    She is on Nebulizers which DuoNeb 4 times a day and albuterol every 2 hours when necessary. .  Pulmicort 0.5 mg twice a day. Chest x-ray was done before shows active disease. Mucinex 600 mg twice a day. Change Solu-Medrol to prednisone 40 mg daily Patient advised to have a PFT done as outpatient, after discharge.         Electronically signed by Rubin Joseph MD, FCCP on 5/22/2019 at 10:47 AM

## 2019-05-22 NOTE — CARE COORDINATION
Pre cert initiated with [facility] Medicare for the acute in pt. Rehab.  Electronically signed by Britton Morris RN on 5/22/19 at 1:30 PM

## 2019-05-22 NOTE — PROGRESS NOTES
Xiomy Lundberg is a 79 y.o. female patient.  Pt was seen and evaluated, less shortness of breath, has ataxia, leaning side to side needs assistance, possible MS exacerbation, FU imaging results and neurology     Current Facility-Administered Medications   Medication Dose Route Frequency Provider Last Rate Last Dose    ipratropium-albuterol (DUONEB) nebulizer solution 1 ampule  1 ampule Inhalation TID Lydia Thompson MD   1 ampule at 05/22/19 1356    oxybutynin (DITROPAN) tablet 5 mg  5 mg Oral TID Avis Arnold MD   5 mg at 05/22/19 1318    insulin lispro (HUMALOG) injection vial 10 Units  10 Units Subcutaneous TID WC Avis Arnold MD   10 Units at 05/22/19 1322    budesonide (PULMICORT) nebulizer suspension 500 mcg  0.5 mg Nebulization BID Avis Arnold MD   500 mcg at 05/22/19 0900    predniSONE (DELTASONE) tablet 20 mg  20 mg Oral Daily Saumya Dejesus MD   20 mg at 05/22/19 0850    Followed by   Marquis Roman ON 5/24/2019] predniSONE (DELTASONE) tablet 15 mg  15 mg Oral Daily Saumya Dejesus MD        Followed by   Marquis Roman ON 5/27/2019] predniSONE (DELTASONE) tablet 10 mg  10 mg Oral Daily Saumya Dejesus MD        Followed by   Marquis Roman ON 5/30/2019] predniSONE (DELTASONE) tablet 5 mg  5 mg Oral Daily Saumya Dejesus MD        lidocaine viscous hcl (XYLOCAINE) 2 % solution 15 mL  15 mL Mouth/Throat Q3H PRN Brigid Kraft MD        midazolam (VERSED) injection 4 mg  4 mg Intravenous Once Brigid Kraft MD        fentaNYL (SUBLIMAZE) injection 100 mcg  100 mcg Intravenous Once Brigid Kraft MD        sodium chloride bacteriostatic 0.9 % injection 30 mL  30 mL Injection Once Brigid Kraft MD        insulin glargine (LANTUS) injection vial 15 Units  15 Units Subcutaneous BID Avis Arnlod MD   15 Units at 05/22/19 0849    azithromycin (ZITHROMAX) tablet 500 mg  500 mg Oral Daily Avis Arnold MD   500 mg at 05/22/19 0850    ciprofloxacin (CIPRO) tablet 500 mg  500 mg Oral 2 times per day Mary Rahman MD   500 mg at 05/22/19 0850    albuterol (PROVENTIL) nebulizer solution 2.5 mg  2.5 mg Nebulization Q2H PRN Aleatha Pool Sedar, DO   2.5 mg at 05/19/19 0231    lisinopril (PRINIVIL;ZESTRIL) tablet 5 mg  5 mg Oral Daily Troy J Holiday, DO   5 mg at 05/22/19 0850    glucose (GLUTOSE) 40 % oral gel 15 g  15 g Oral PRN Aleatha Pool Sedar, DO        dextrose 50 % solution 12.5 g  12.5 g Intravenous PRN Aleatha Pool Sedar, DO        glucagon (rDNA) injection 1 mg  1 mg Intramuscular PRN Aleatha Pool Sedar, DO        dextrose 5 % solution  100 mL/hr Intravenous PRN Aleatha Pool Sedar, DO        insulin lispro (HUMALOG) injection vial 0-12 Units  0-12 Units Subcutaneous TID SAYDA Chilel D Sedar, DO   2 Units at 05/22/19 1317    insulin lispro (HUMALOG) injection vial 0-6 Units  0-6 Units Subcutaneous Nightly Aleatha Pool Sedar, DO   2 Units at 05/21/19 2037    guaiFENesin [facility]) extended release tablet 600 mg  600 mg Oral BID Aleatha Pool Sedar, DO   600 mg at 05/22/19 0850    sodium chloride flush 0.9 % injection 10 mL  10 mL Intravenous 2 times per day Aleatha Pool Sedar, DO   10 mL at 05/21/19 2037    sodium chloride flush 0.9 % injection 10 mL  10 mL Intravenous PRN Aleatha Pool Sedar, DO        magnesium hydroxide (MILK OF MAGNESIA) 400 MG/5ML suspension 30 mL  30 mL Oral Daily PRN Aleatha Pool Sedar, DO        ondansetron [facility]) injection 4 mg  4 mg Intravenous Q6H PRN Aleatha Pool Sedar, DO        enoxaparin (LOVENOX) injection 40 mg  40 mg Subcutaneous Daily Tatiana Castro D Sedar, DO   40 mg at 05/22/19 0849    labetalol (NORMODYNE;TRANDATE) injection syringe 10 mg  10 mg Intravenous Q10 Min PRN Aleatha Pool Sedar, DO        aspirin EC tablet 81 mg  81 mg Oral Daily Aleatha Pool Sedar, DO   81 mg at 05/22/19 0850    atorvastatin (LIPITOR) tablet 40 mg  40 mg Oral Nightly Aleatha Pool Sedar, DO   40 mg at 05/21/19 2037     No Known Allergies  Active Problems:    Cerebrovascular accident (CVA) due to occlusion of small artery    TIA (transient ischemic attack)    Mild intermittent asthma with exacerbation    Shortness of breath    Cerebrovascular accident (CVA) determined by clinical assessment likely ALEXANDER with Right kip ([address]    Urinary incontinence    Right leg weakness    Type 2 diabetes mellitus with hyperglycemia, with long-term current use of insulin (HCC)    Carotid artery plaque, left    Cervical radicular pain    Cervical spinal stenosis    Cervical spondylosis with myelopathy    Orthostatic hypotension    Lumbar and sacral osteoarthritis    Type 2 diabetes mellitus without complication, with long-term current use of insulin (HCC)  Resolved Problems:    Cervical spondylosis without myelopathy    Blood pressure 138/78, pulse 94, temperature 97.9 °F (36.6 °C), temperature source Oral, resp. rate 16, height 5' 3\" (1.6 m), weight 195 lb (88.5 kg), SpO2 99 %. Subjective:  Symptoms:  No shortness of breath, malaise, cough, chest pain, weakness, headache, chest pressure, anorexia, diarrhea or anxiety. Diet:  No nausea or vomiting. Objective:  General Appearance:  Well-appearing, in no acute distress and comfortable. Vital signs: (most recent): Blood pressure 138/78, pulse 94, temperature 97.9 °F (36.6 °C), temperature source Oral, resp. rate 16, height 5' 3\" (1.6 m), weight 195 lb (88.5 kg), SpO2 99 %. HEENT: Normal HEENT exam.    Lungs:  (+ wheezing, decrease BS)  Heart: S1 normal and S2 normal.    Abdomen: Abdomen is soft. Bowel sounds are normal.   There is no epigastric area or suprapubic area tenderness. Pulses: Distal pulses are intact. Neurological: Patient is alert and oriented to person, place and time. Pupils:  Pupils are equal, round, and reactive to light. Skin:  Warm and dry.       Lab Results   Component Value Date    WBC 10.1 05/22/2019    HGB 13.7 05/22/2019    HCT 40.0 05/22/2019    MCV 89.6 05/22/2019     05/22/2019     Lab Results   Component Value Date     05/22/2019    K 4.7 05/22/2019     05/22/2019    CO2 24 05/22/2019    BUN 15 05/22/2019    CREATININE 0.55 05/22/2019    GLUCOSE 343 05/22/2019    CALCIUM 9.2 05/22/2019        Assessment & Plan  1)  copd exacerbation  improving  Added Iv steroids  pulm eval  duonebs  Azithromycin PO  2) RLE weakness better, TI A ruled out vs MS exacerbation vs lumbar radiculopathy  LEONID negative for thrombus and vegetation     UTI, Klebselia  - urine culture positive for GNB, continue ciprp  NIDDM2 with hyperglycemia  Increase lantus     4) acute urinary re tension   resovled  5) urge incontinence  Start oxybutein  6) foraminal stenosis, lumbar   FU neurosurgery      Shea Vazquez MD  5/22/2019

## 2019-05-22 NOTE — PROGRESS NOTES
Neurology Daily Progress Note  Name: Victoriano Abraham  Age: 79 y.o. Gender: female  CodeStatus: Full Code  Allergies: No Known Allergies    Chief Complaint:Extremity Weakness (right leg weakness, woke up from a nap at 1730 today and right leg is weak)    Primary Care Provider: Kleber Medina MD  InpatientTreatment Team: Treatment Team: Attending Provider: Sarita Huizar MD; Consulting Physician: Yontaan Reyes MD; Consulting Physician: Diane Finley MD; Registered Nurse: Vidal Dawn, RN; Registered Nurse: Tomas Laws RN  Admission Date: 5/17/2019      HPI   Pt seen and examined for neuro follow up. NO Headache, double vision, blurry vision, difficulty with speech, difficulty with swallowing, weakness, numbness, pain, nausea, vomiting, choking, neck pain, dizziness  Vitals:    05/22/19 0430   BP: 139/70   Pulse: 91   Resp:    Temp: 97.9 °F (36.6 °C)   SpO2: 100%        Physical Exam   Constitutional: She is oriented to person, place, and time. She appears well-nourished. No distress. HENT:   Head: Normocephalic and atraumatic. Eyes: Pupils are equal, round, and reactive to light. EOM are normal.   Neck: Neck supple. No JVD present. Cardiovascular: Normal rate and regular rhythm. Pulmonary/Chest: Effort normal and breath sounds normal. No respiratory distress. Abdominal: Soft. Bowel sounds are normal. She exhibits no distension. There is no tenderness. Musculoskeletal: She exhibits no edema. Neurological: She is alert and oriented to person, place, and time. No cranial nerve deficit. See neuro exam   Skin: Skin is warm and dry. She is not diaphoretic. Psychiatric: She has a normal mood and affect.      Mental Status Exam:             Level of Alertness:   awake            Orientation:   person, place, time            Memory:   normal            Fund of Knowledge:  normal            Attention/Concentration:  normal            Language:  normal    Cranial Nerves Cranial nerve III           Pupils:  equal, round, reactive to light      Cranial nerves III, IV, VI           Extraocular Movements: intact      Cranial nerve V           Facial sensation:  intact      Cranial nerve VII           Facial strength: intact      Cranial nerve VIII           Hearing:  intact      Cranial nerve IX           Palate:  intact      Cranial nerve XI         Shoulder shrug:  intact      Cranial nerve XII          Tongue movement:  normal    Motor:    Drift:  absent  Motor exam RLE weakness 3-4/5  Tone:  Increased tone RLE  Abnormal Movements:  absent            Coordination:           Finger/Nose   Right:  normal              Left:  normal          Heel-Knee-Shin                Right:  normal              Left:  normal          Rapid Alternating Movements              Right:  normal              Left:  normal          Gait:                       Casual:  Slow, drag right foot at times                         Reflexes:             Deep Tendon Reflexes:             Right patellar and achilles reflex absent                 Review of Systems   Constitutional: Positive for activity change. Negative for appetite change, chills, fatigue and fever. HENT: Negative for hearing loss and trouble swallowing. Eyes: Negative for visual disturbance. Respiratory: Negative for cough, chest tightness, shortness of breath and wheezing. Cardiovascular: Negative for chest pain, palpitations and leg swelling. Gastrointestinal: Negative for nausea and vomiting. Genitourinary: Positive for difficulty urinating (urinary incontinence). Negative for dysuria, frequency and hematuria. Musculoskeletal: Positive for gait problem. Negative for back pain and neck pain. Skin: Negative for color change and rash. Neurological: Positive for weakness (RLE). Negative for dizziness, tremors, seizures, syncope, facial asymmetry, speech difficulty, light-headedness, numbness and headaches.    Psychiatric/Behavioral: Negative for agitation, confusion and hallucinations. The patient is not nervous/anxious. Medications:  Reviewed    Infusion Medications:    dextrose       Scheduled Medications:    ipratropium-albuterol  1 ampule Inhalation TID    insulin lispro  10 Units Subcutaneous TID     budesonide  0.5 mg Nebulization BID    predniSONE  20 mg Oral Daily    Followed by   Chelsey Boss ON 5/24/2019] predniSONE  15 mg Oral Daily    Followed by   Chelsey Boss ON 5/27/2019] predniSONE  10 mg Oral Daily    Followed by   Chelsey Boss ON 5/30/2019] predniSONE  5 mg Oral Daily    midazolam  4 mg Intravenous Once    fentanNYL  100 mcg Intravenous Once    sodium chloride bacteriostatic  30 mL Injection Once    insulin glargine  15 Units Subcutaneous BID    azithromycin  500 mg Oral Daily    ciprofloxacin  500 mg Oral 2 times per day    lisinopril  5 mg Oral Daily    insulin lispro  0-12 Units Subcutaneous TID WC    insulin lispro  0-6 Units Subcutaneous Nightly    guaiFENesin  600 mg Oral BID    sodium chloride flush  10 mL Intravenous 2 times per day    enoxaparin  40 mg Subcutaneous Daily    aspirin  81 mg Oral Daily    atorvastatin  40 mg Oral Nightly     PRN Meds: lidocaine viscous hcl, albuterol, glucose, dextrose, glucagon (rDNA), dextrose, sodium chloride flush, magnesium hydroxide, ondansetron, labetalol    Labs:   Recent Labs     05/20/19  0532 05/21/19  0522 05/22/19  0525   WBC 8.6 8.9 10.1   HGB 12.9 13.6 13.7   HCT 39.1 39.8 40.0    241 280     Recent Labs     05/20/19  0532 05/21/19  0522 05/22/19  0525    140 137   K 3.9 4.3 4.7    103 102   CO2 25 25 24   BUN 13 16 15   CREATININE 0.49* 0.58 0.55   CALCIUM 9.7 9.5 9.2     No results for input(s): AST, ALT, BILIDIR, BILITOT, ALKPHOS in the last 72 hours. Recent Labs     05/20/19  0532   INR 1.0     No results for input(s): Eward Pong in the last 72 hours.     Urinalysis:   Lab Results   Component Value Date    NITRU POSITIVE 05/17/2019    WBCUA  05/17/2019    BACTERIA MODERATE 05/17/2019    RBCUA 3-5 05/17/2019    BLOODU Negative 05/17/2019    SPECGRAV 1.038 05/17/2019    GLUCOSEU 250 05/17/2019       Radiology:   Most recent    Chest CT      WITH CONTRAST:No results found for this or any previous visit. WITHOUT CONTRAST: No results found for this or any previous visit. CXR      2-view:   Results for orders placed during the hospital encounter of 05/17/19   XR CHEST STANDARD (2 VW)    Narrative EXAMINATION: XR CHEST (2 VW)    CLINICAL HISTORY: COUGH    COMPARISONS: May 17, 2019. FINDINGS:    Osseous structures intact. Cardiopericardial silhouette normal. Pulmonary vasculature normal. Lungs clear. Impression No acute cardiopulmonary disease. Portable:   Results for orders placed during the hospital encounter of 05/17/19   XR CHEST PORTABLE    Narrative EXAMINATION: XR CHEST PORTABLE    CLINICAL HISTORY:   LOSS OF USE OF RIGHT LEG     COMPARISONS: NOVEMBER 24, 2013    FINDINGS: Osseous structures intact. Cardiopericardial silhouette normal. Pulmonary vasculature normal. Lungs clear. Impression NO ACUTE CARDIOPULMONARY DISEASE. Echo No results found for this or any previous visit. Assessment/Plan:    RLE weakness  MRI brain no acute process, T2 hyperintense focus periventricular similar to 1/22/11  Carotid US <50% internal bilat, bilat plaque burden at carotid bifurcation,  TTE showed IAS aneurysm, tech difficulty bubble study  LEONID  negative  ASA 81mg daily  Pt with continued RLE weakness and new urinary retention. Will order MRI Lumbar spine.   MRI lumbar spine shows right foraminal stenosis and compression on exiting L4 nerve root   NSGY consult  Suspicious ALEXANDER CVA not seen on MRI given clinical findings of complete weakness of RLE  In addition pt has L4 radiculopathy which can be addressed as outpatient  Pt with MRI cervical spine 2017 at [facility] that showed severe canal stenosis and significant spondylosis with severe narrowing. MRI also showed multifocal areas of T2 abnormalities concerning for demyelinating disease suspicious for MS. Will repeat MRI cervical spine and thoracic spine    MRI seen and reviewed, low suggestion of demyelination disease, Cervical stenosis, moderate, not surgical  Could still be primary relapsing or secondary relapsing MS. Findings only based on comperision MRI done at [facility] and improvement of MRI. No recommended treatment for now. Rehab, Tapering steroids    Leonel Perez MD, Grace Hairston, American Board of Psychiatry & Neurology  Board Certified in Vascular Neurology  Board Certified in Neuromuscular Medicine  Certified in Neurorehabilitation           Collaborating physicians: Dr Paloma Perez, Dr REYNALDO Patel, Dr Sachi Alvarado    Electronically signed by MAJOR Enamorado CNP on 5/22/2019 at 9:15 AM

## 2019-05-22 NOTE — PROGRESS NOTES
[facility]CCUP[facility] THERAPY ORTHOPEDIC TREATMENT NOTE     Date: 2019  Patient Name: Lucila Jeffery        MRN: [id number]  Account: [de-identified]   : 1950  (79 y.o.)  Room: [place] [address]    Chart Review:  Diagnosis:  The encounter diagnosis was Cerebrovascular accident (CVA) due to occlusion of small artery. Restrictions:    Restrictions/Precautions  Restrictions/Precautions: Fall Risk         Subjective:  Patient states:  \"I feel alright. \"   Pain:0/10  Start of tx: 0/10        End of tx:  Pain Assessment  Patient Currently in Pain: No    Objective:  ADL       Pt SBA supine to sit with increased time and effort, bed flat. Pt used hand rails. Pt able to don socks without assistance. Pt SBA sit to stand. Ambulated to bathroom without LOB with w/w. Verbal cues to manage O2 line. Pt able to stand and brush teeth without LOB. Pt with RUE on sink for support. Pt ambulated to toilet. Pt Min A to thread R leg into preformed brief. Pt urinated but no bowel movement. Use of grab bars for toilet transfer.         Treatment consisted of:   [] ADL Training  [x] Strengthening   [x] Transfer Training    [] DME Education  [] HEP   [] Manual Therapy   [] Patient Education  [] Other:      Assessment/Discharge Disposition:     Performance deficits / Impairments: Decreased functional mobility , Decreased ADL status, Decreased high-level IADLs, Decreased balance  Prognosis: Good  Discharge Recommendations: Continue to assess pending progress  History: Mulit comorb   Exam: 4 perf imp   Assistance / Modification: SBA/CGA    [employer]    AM-PAC Daily Activity Inpatient   How much help for putting on and taking off regular lower body clothing?: A Little  How much help for Bathing?: A Little  How much help for Toileting?: A Little  How much help for putting on and taking off regular upper body clothing?: None  How much help for taking care of personal grooming?: None  How much help for eating meals?: None  AM-PAC Inpatient Daily Activity Raw Score: 21  AM-PAC Inpatient ADL T-Scale Score : 44.27  ADL Inpatient CMS 0-100% Score: 32.79  ADL Inpatient CMS G-Code Modifier : CJ    Plan:  [x] Continue OT per POC  [] D/C OT  [] Other:     Goals/Plan of care addressed during this session:   [x] Improve balance  [] Improve Strength   [x] Improve [place] with functional transfers   []  Improve [place] with ADLs     Minutes:  OT Individual Minutes  Time In: 1500  Time Out: 1523  Minutes: 23      Electronically signed by:     GWEN Watkins    5/22/2019, 3:34 PM

## 2019-05-22 NOTE — CARE COORDINATION
Per Solo Gutierres this pt is out of network with her insurance for [employer]. LSW called [facility] to see if Ted Manuel could start the precert again.

## 2019-05-22 NOTE — PROGRESS NOTES
[facility] EMERGENCY [facility] AT [place] Respiratory Therapy Evaluation   Current Order:  Duoneb QID      Home Regimen: PRN      Ordering Physician: Milton Smyth  Re-evaluation Date:  5/25     Diagnosis: CVA      Patient Status: Stable / Unstable + Physician notified    The following MDI Criteria must be met in order to convert aerosol to MDI with spacer. If unable to meet, MDI will be converted to aerosol:  []  Patient able to demonstrate the ability to use MDI effectively  []  Patient alert and cooperative  []  Patient able to take deep breath with 5-10 second hold  []  Medication(s) available in this delivery method   []  Peak flow greater than or equal to 200 ml/min            Current Order Substituted To  (same drug, same frequency)   Aerosol to MDI [] Albuterol Sulfate 0.083% unit dose by aerosol Albuterol Sulfate MDI 2 puffs by inhalation with spacer    [] Levalbuterol 1.25 mg unit dose by aerosol Levalbuterol MDI 2 puffs by inhalation with spacer    [] Levalbuterol 0.63 mg unit dose by aerosol Levalbuterol MDI 2 puffs by inhalation with spacer    [] Ipratropium Bromide 0.02% unit dose by aerosol Ipratropium Bromide MDI 2 puffs by inhalation with spacer    [] Duoneb (Ipratropium + Albuterol) unit dose by aerosol Ipratropium MDI + Albuterol MDI 2 puffs by inhalation w/spacer   MDI to Aerosol [] Albuterol Sulfate MDI Albuterol Sulfate 0.083% unit dose by aerosol    [] Levalbuterol MDI 2 puffs by inhalation Levalbuterol 1.25 mg unit dose by aerosol    [] Ipratropium Bromide MDI by inhalation Ipratropium Bromide 0.02% unit dose by aerosol    [] Combivent (Ipratropium + Albuterol) MDI by inhalation Duoneb (Ipratropium + Albuterol) unit dose by aerosol   Treatment Assessment [Frequency/Schedule]:  Change frequency to: _____Duoneb TID_____________________________________________per Protocol, P&T, MEC      Points 0 1 2 3 4   Pulmonary Status  Non-Smoker  []   Smoking history   < 20 pack years  []   Smoking history  ?  20 pack years  []   Pulmonary Disorder  (acute or chronic)  [x]   Severe or Chronic w/ Exacerbation  []     Surgical Status No [x]   Surgeries     General []   Surgery Lower []   Abdominal Thoracic or []   Upper Abdominal Thoracic with  PulmonaryDisorder  []     Chest X-ray Clear/Not  Ordered     [x]  Chronic Changes  Results Pending  []  Infiltrates, atelectasis, pleural effusion, or edema  []  Infiltrates in more than one lobe []  Infiltrate + Atelectasis, &/or pleural effusion  []    Respiratory Pattern Regular,  RR = 12-20 [x]  Increased,  RR = 21-25 []  ALBERTS, irregular,  or RR = 26-30 []  Decreased FEV1  or RR = 31-35 []  Severe SOB, use  of accessory muscles, or RR ? 35  []    Mental Status Alert, oriented,  Cooperative [x]  Confused but Follows commands []  Lethargic or unable to follow commands []  Obtunded  []  Comatose  []    Breath Sounds Clear to  auscultation  []  Decreased unilaterally or  in bases only []  Decreased  bilaterally  [x]  Crackles or intermittent wheezes []  Wheezes []    Cough Strong, Spontan., & nonproductive [x]  Strong,  spontaneous, &  productive []  Weak,  Nonproductive []  Weak, productive or  with wheezes []  No spontaneous  cough or may require suctioning []    Level of Activity Ambulatory []  Ambulatory w/ Assist  [x]  Non-ambulatory []  Paraplegic []  Quadriplegic []    Total    Score:___6____     Triage Score:____4____      Tri       Triage:     1. (>20) Freq: Q3    2. (16-20) Freq: Q4   3. (11-15) Freq: QID & Albuterol Q2 PRN    4. (6-10) Freq: TID & Albuterol Q2 PRN    5. (0-5) Freq Q4prn

## 2019-05-22 NOTE — CARE COORDINATION
Received a call from [facility] [address] is out of network for the pt. and she has no out of network benefits.  I called Shanna BREWSTER C3 and Kelly Hillman and gave them the information from the insurance co.  Electronically signed by Adriana Aiken RN on 5/22/19 at 3:06 PM

## 2019-05-22 NOTE — CARE COORDINATION
DR WALLACE AND DR Shweta Carolina AND I WERE SPEAKING TO EACH OTHER ABOUT THIS PT AND DR WALLACE STATED THE PT'S MRI MIGHT HAVE BEEN TOO EARLY AND DR WALLACE FEELS PT IS POSITIVE FOR A CLINICAL CVA PLUS POSSIBLY MULTIPLE SCLEROSIS. DR WALLACE [facility]. I TOLD HIM THE PRECERT FOR [facility] WENT TO MEDICAL REVIEW BUT THE STAFF RN STATED PT AMB IS WORSE AND SHE IS DRAGGING HER FOOT. DR ROWLAND DID SPEAK WITH THE PT THEN I SPOKE WITH THE PT.  WITH FREEDOM OF CHOICE SHE W[facility] SHE TOLD ME AND DR Shweta Carolina APPROVED HER BUT WE NEED A PRECERT. LNAE FROM [facility] CALLED ME AND I EXPLAINED ALL THIS TO HER. SHE STATED ONCE THE MEDICAL REVIEW IS DONE SHE WOULD SEND A DENIAL SINCE WE PLAN TO START A WHOLE NEW PRECERT FOR [facility]. I TOLD  DIANA SOSA TO SEE THE PT FOR UPDATE FOR [facility] PRECERT AND ALSO CALLED P.T. FOR THE SAME. I NOTIFIED THE SW AND THE  NOTIFIED [address]. I ALSO NOTIFIED Shelly Cantu RN OF ALL THE ABOVE AND AS SOON AS PT/OT SEES HER SHE WILL START THE  PRECERT.

## 2019-05-22 NOTE — CONSULTS
Physical Medicine & Rehabilitation  Consult Note      Admitting Physician: Dileep Polanco MD    Primary Care Provider: Willy Padgett MD     Reason for Consult:  Asses rehab needs,promote physical and mental function, and decrease likelihood of re-admit to the hospital after discharge. History of Present Illness:    Darylene Shadow is a 79 y.o. female admitted to [facility] on 5/17/2019. Patient she had awaken recently and had severe right lower extremity weakness. Her workup included rule out CVA including an MRI and CT of the brain which failed to show any identical findings radiologically. However from a clinical standpoint she was diagnosed with a probable right ALEXANDER infarct. Upon further review of her medical records it looks like she's had a similar episode in the past at [facility] and previously seen at [address] [place] [address] the same problem that comes and goes over the years and was diagnosed as cervical myelopathy. She had an MRI in 2017 date revealed moderate to severe central canal stenosis. She states that she was not told to follow-up with anybody and her weakness of her right leg at that time had improved. She states that the weakness returned again recently she denies any falls she denies any significant neck pain. Neck Pain    This is a recurrent problem. The current episode started in the past 7 days. The problem occurs constantly. The problem has been unchanged. The pain is associated with nothing. The pain is present in the midline. The quality of the pain is described as aching. The pain is at a severity of 3/10. The pain is mild. The symptoms are aggravated by twisting. The pain is same all the time. Stiffness is present in the morning. Associated symptoms include numbness and weakness. She has tried oral narcotics, home exercises and acetaminophen for the symptoms. The treatment provided mild relief.    Neurologic Problem   The patient's primary symptoms include focal sensory loss, focal weakness and weakness. The patient's pertinent negatives include no altered mental status or clumsiness. This is a recurrent problem. The current episode started in the past 7 days. The neurological problem developed suddenly. The problem is unchanged. There was right-sided and lower extremity focality noted. Associated symptoms include back pain and neck pain. Past treatments include walking. The treatment provided mild relief. There is no history of a bleeding disorder, a clotting disorder, a CVA, dementia, head trauma, liver disease, mood changes or seizures. Their inpatient work up has included:    Imaging:    Imaging and other studies reviewed and discussed with patient and staff    Cta Head W   5/18/2019  CTA head with intravenous contrast medium. HISTORY: Inability to use left leg. Technical factors: CTA head imaging formatted as contiguous axial images through skull base. Sagittal, coronal, right and left oblique, 3-D MIP obtained during postprocessing. Intravenous contrast medium consisting of Isovue-370, 100 mL utilized. Study done in conjunction with CTA neck reported separately. No prior CT head available for comparison. FINDINGS: Bilateral A1 and A2 segments, anterior carotid arteries patent. Anterior communicating artery patent. Communicating segments bilateral internal carotid arteries Bilateral M1 and M2 segments middle cerebral arteries patent. Congenital absence, bilateral communicating arteries. Bilateral P1, P2 segments, posterior cerebral arteries patent. Basilar tip and basilar artery patent. No aneurysm, or obstruction identified. Negative CTA head. Xr Chest  5/18/2019  EXAMINATION: XR CHEST (2 VW) CLINICAL HISTORY: COUGH COMPARISONS: May 17, 2019. FINDINGS: Osseous structures intact. Cardiopericardial silhouette normal. Pulmonary vasculature normal. Lungs clear. No acute cardiopulmonary disease.     Ct Head Wo  5/17/2019  CT HEAD WO CONTRAST : 5/17/2019 CLINICAL HISTORY:  Loss of use of right leg . COMPARISON: Head MRI 6/22/2011. TECHNIQUE: Spiral unenhanced images were obtained of the head, with routine reconstructions performed. All CT scans at this facility use dose modulation, iterative reconstruction, and/or weight based dosing when appropriate to reduce radiation dose to as low as reasonably achievable. FINDINGS: There is no intracranial hemorrhage, mass effect, midline shift, extra-axial collection, evidence of hydrocephalus, recent ischemic infarct, or skull fracture identified. Mild to moderate atrophic changes have not significantly changed from 6/22/2011. The mastoid air cells and visualized paranasal sinuses are essentially clear. NO ACUTE INTRACRANIAL PROCESS IDENTIFIED. Cta Neck W   5/18/2019  CTA neck with intravenous contrast medium. HISTORY: Unable to move right leg. Technical factors: CTA neck obtained and formatted as contiguous axial images from aortic arch to skull base. Sagittal, coronal, overlap, right and left anterior oblique, and 3-D MIP reconstruction obtained during postprocessing. Intravenous contrast medium, consisting of Isovue-370, 100 mL identified. Study is done in conjunction with CTA head, reported separately. No prior CTA neck images available for comparison. FINDINGS: Right carotid: Right common carotid artery arises from brachiocephalic trunk. Ostium patent. Course and caliber right common carotid artery without anomaly. Carotid bifurcation without anomaly. Cervical segment patent but tortuous. Petrous, lacerum, clinoid, ophthalmic, cavernous, and communicating segments patent. Left carotid: Left common carotid artery arises from aortic arch. Ostium patent. Course and caliber left common carotid artery without anomaly. Calcification identified carotid bulb yielding 60 % stenosis by NASCET criteria.  Remainder cervical segment, as well as petrous, lacerum, clinoid, ophthalmic, cavernous, and communicating segments patent. Right vertebral: Right vertebral artery arises from right subclavian artery. Pre foraminal, foraminal, extradural, and intradural segments patent. Left vertebral: Left vertebral artery arises from left subclavian artery. Pre foraminal, foraminal, extradural, and intradural segments patent. 60 % stenosis left internal carotid artery by NASCET criteria. No obstruction or dissection identified. Mri Lumbar : 5/22/2019  EXAMINATION: MRI LUMBAR SPINE WO CONTRAST: DATE AND TIME: 5/21/2019 at 10:00 AM. CLINICAL HISTORY: LOWER BACK PAIN. WEAKNESS, EXTREMITY (PARAPLEGIA). COMPARISON: None. TECHNIQUE:  Multi-sequence, multiplanar imaging of the lumbar spine was performed. No gadolinium contrast administered. VOLUME: None. MR CONTRAST: None. FINDINGS: Vertebrae: No acute fracture. Marrow signal changes are within normal limits. Conus: The conus medullaris ends at L1 level and is unremarkable. Disc bulging at T11-T12. T12/L1: There is no evidence of disc bulging or disc herniation. No significant facet hypertrophy or thickening of ligamenta flava. There is no central spinal canal stenosis. There is no neural foraminal stenosis. L1/L2:  There is no evidence of disc bulging or disc herniation. No significant facet hypertrophy or thickening of ligamenta flava. There is no central spinal canal stenosis. There is no neural foraminal stenosis. L2/L3:  Mild disc bulging with ventral ridging on the thecal sac. Mild facet arthropathy. No central canal or foraminal narrowing. L3/L4:  Mild disc space narrowing with broad-based disc bulging and moderate facet arthropathy. Thickening of the ligamenta flava. Findings resulting in mild spinal canal narrowing. No foraminal stenosis. L4/L5:  Broad-based disc bulging and osteophytic spurring off the endplates resulting in ventral ridging on the thecal sac.  Changes asymmetric to the right with resulting right foraminal stenosis and compression on the exiting right L4 nerve root. Facet arthropathy combining with disc disease with borderline canal narrowing. No left foraminal stenosis. L5/S1: Broad-based disc bulging/protrusion with osteophytic spurring. Facet hypertrophy. Borderline canal narrowing. There is bilateral foraminal narrowing, left greater than right. Retroperitoneum: No abnormality of the visualized aorta or retroperitoneum. MULTILEVEL DISC DISEASE WITH MILD CANAL NARROWING AT L3-L4. RIGHT L4-L5 FORAMINAL STENOSIS SECONDARY TO DISC OSTEOPHYTE CHANGES/PROTRUSION. L5-S1 MILD FORAMINAL NARROWING, LEFT GREATER THAN RIGHT. Xr Chest  : 5/18/2019  EXAMINATION: XR CHEST PORTABLE CLINICAL HISTORY:   LOSS OF USE OF RIGHT LEG COMPARISONS: NOVEMBER 24, 2013 FINDINGS: Osseous structures intact. Cardiopericardial silhouette normal. Pulmonary vasculature normal. Lungs clear. NO ACUTE CARDIOPULMONARY DISEASE. Mri Brain Wo  5/18/2019  EXAMINATION:  MRI BRAIN WITHOUT IV CONTRAST CLINICAL HISTORY:  RIGHT LOWER EXTREMITY WEAKNESS, EVALUATE FOR POSSIBLE TIA OR CVA COMPARISON:  5/17/2019 CT BRAIN TECHNIQUE:  Multisequence and multiplane MRI brain is obtained without IV gadolinium. FINDINGS:  There is cerebral atrophy and age related findings in the brain. There is relatively extensive patchy T2 hyperintense foci in the periventricular white matter of both cerebral hemispheres somewhat similar to the 1/22/2011 MRI brain. Differential considerations include a manifestation of small vessel disease or microangiopathy and demyelinating disease. There is no restricted diffusion or MRI evidence of an acute or subacute cerebral infarction. No abnormal paramagnetic signal in the  brain. There is no intracranial mass, hemorrhage, \"mass effect\" or midline shift. There is mucosal thickening in the maxillary and ethmoid sinuses suggesting acute or chronic sinusitis. Mastoids appear clear. 1. CEREBRAL ATROPHY AND AGE RELATED FINDINGS IN THE BRAIN.  2. NO RESTRICTED DIFFUSION OR MRI EVIDENCE OF AN ACUTE OR SUBACUTE CEREBRAL INFARCTION. 3. RELATIVELY EXTENSIVE PATCHY T2 HYPERINTENSE FOCI IN PERIVENTRICULAR WHITE MATTER OF BOTH CEREBRAL HEMISPHERES SIMILAR TO THE 1/22/2011 MRI BRAIN. DIFFERENTIAL CONSIDERATIONS INCLUDE A MANIFESTATION OF SMALL VESSEL DISEASE OR MICROANGIOPATHY AND DEMYELINATING DISEASE. 4. NO INTRACRANIAL MASS, HEMORRHAGE, \"MASS EFFECT\" OR MIDLINE SHIFT. Us Carotid Artery Bilateral : 5/18/2019  BILATERAL DUPLEX CAROTID ULTRASOUND CLINICAL INFORMATION:  RIGHT LOWER EXTREMITY WEAKNESS, POSSIBLE TIA OR CVA, INVESTIGATE POSSIBLE CAROTID ARTERY STENOSIS IN THE NECK COMPARISON:  NONE AVAILABLE FINDINGS:  The bilateral carotid duplex ultrasound study demonstrates the following:                                                                  RIGHT            LEFT Carotid plaque burden                           mild               moderate Proximal common carotid artery           81 cm/s            89 cm/s  Mid common carotid artery                   67 cm/s            73 cm/s  Distal common carotid artery                60 cm/s           46 cm/s Proximal internal carotid artery             53 cm/s            81 cm/s Mid internal carotid artery                      69 cm/s           89 cm/s Distal internal carotid artery                  67 cm/s             71 cm/s External carotid artery                            97 cm/s           80 cm/s    ICA/CCA ratio                                         1.0                1.2   Vertebral arteries antegrade flow         Yes                     Yes      1. RIGHT INTERNAL CAROTID ARTERY: <50% STENOSIS. 2. LEFT INTERNAL CAROTID ARTERY: <50% STENOSIS. 3. MODERATE CAROTID PLAQUE BURDEN IN LEFT CAROTID BIFURCATION IN THE NECK. 4. MILD CAROTID PLAQUE BURDEN IN RIGHT CAROTID BIFURCATION IN THE NECK. 5. BILATERALLY PATENT VERTEBRAL ARTERIES WITH ANTEGRADE BLOOD FLOW.  Validated velocity measurements with angiographic measurements, velocity criteria are extrapolated from diameter data as defined by the Society of Radiologist in [address] [place] [address] Drive Radiology 2003; 118;972-400. Labs:     labs reviewed and discussed with patient and staff    Lab Results   Component Value Date    POCGLU 269 05/22/2019    POCGLU 224 05/21/2019    POCGLU 117 05/21/2019    POCGLU 141 05/21/2019    POCGLU 233 05/21/2019     Lab Results   Component Value Date     05/22/2019    K 4.7 05/22/2019     05/22/2019    CO2 24 05/22/2019    BUN 15 05/22/2019    CREATININE 0.55 05/22/2019    CALCIUM 9.2 05/22/2019    LABALBU 4.1 05/17/2019    BILITOT 0.3 05/17/2019    ALKPHOS 89 05/17/2019    AST 15 05/17/2019    ALT 12 05/17/2019     Lab Results   Component Value Date    WBC 10.1 05/22/2019    RBC 4.46 05/22/2019    HGB 13.7 05/22/2019    HCT 40.0 05/22/2019    MCV 89.6 05/22/2019    MCH 30.7 05/22/2019    MCHC 34.2 05/22/2019    RDW 13.6 05/22/2019     05/22/2019    MPV 9.0 08/29/2015     No results found for: VITD25  Lab Results   Component Value Date    COLORU Yellow 05/17/2019    NITRU POSITIVE 05/17/2019    GLUCOSEU 250 05/17/2019    KETUA Negative 05/17/2019    UROBILINOGEN 0.2 05/17/2019    BILIRUBINUR Negative 05/17/2019     Lab Results   Component Value Date    PROTIME 10.0 05/20/2019     Lab Results   Component Value Date    INR 1.0 05/20/2019         I discussed results with patient. Current Rehabilitation Assessments:    Rehabilitation:  Physical therapy: FIMS:  BedMobility:      Transfers: Sit to Stand: Contact guard assistance  Stand to sit: Contact guard assistance, Ambulation 1  Surface: level tile  Device: Rolling Walker  Assistance: Contact guard assistance  Quality of Gait: significant RLE weakness with foot drop, inconsistent step length, R toe out.  no LOB however patient unsteady   Distance: 28' x2   Comments: with turns, VC for safety with negotiating room environement and safety with turning with 2ww,      FIMS: ,  , Assessment: patient demonstrates foot drop and drags right LE when fatigued. Requires verbal cues for safety with transfers.        Occupational therapy: FIMS:   ,  ,        ADL  Feeding: Independent (05/18/19 1340)  Grooming: Setup (05/18/19 1340)  UE Bathing: Supervision (05/18/19 1340)  LE Bathing: Stand by assistance;Contact guard assistance (05/18/19 1340)  UE Dressing: Setup (05/18/19 1340)  LE Dressing: Contact guard assistance;Stand by assistance (05/18/19 1340)  Toileting: Stand by assistance (05/18/19 1340)  Additional Comments: ADL's simulated or perfomred on BSC (05/18/19 1340)    LTG:                 Speech therapy: FIMS:          Past Medical History:          Diagnosis Date    Asthma     Bladder infection     Chicken pox     Chronic back pain     Hypertension     Measles     Mumps     Osteoarthritis     Pneumonia     Whooping cough          PastSurgical History:          Procedure Laterality Date    APPENDECTOMY  1978    HYSTERECTOMY  1983    TONSILLECTOMY  1966         Allergies:   No Known Allergies     CurrentMedications:     Current Facility-Administered Medications: ipratropium-albuterol (DUONEB) nebulizer solution 1 ampule, 1 ampule, Inhalation, TID  insulin lispro (HUMALOG) injection vial 10 Units, 10 Units, Subcutaneous, TID WC  budesonide (PULMICORT) nebulizer suspension 500 mcg, 0.5 mg, Nebulization, BID  predniSONE (DELTASONE) tablet 20 mg, 20 mg, Oral, Daily **FOLLOWED BY** [START ON 5/24/2019] predniSONE (DELTASONE) tablet 15 mg, 15 mg, Oral, Daily **FOLLOWED BY** [START ON 5/27/2019] predniSONE (DELTASONE) tablet 10 mg, 10 mg, Oral, Daily **FOLLOWED BY** [START ON 5/30/2019] predniSONE (DELTASONE) tablet 5 mg, 5 mg, Oral, Daily  lidocaine viscous hcl (XYLOCAINE) 2 % solution 15 mL, 15 mL, Mouth/Throat, Q3H PRN  midazolam (VERSED) injection 4 mg, 4 mg, Intravenous, Once  fentaNYL (SUBLIMAZE) injection 100 mcg, 100 mcg, Intravenous, Once  sodium chloride bacteriostatic 0.9 % injection 30 mL, 30 mL, Injection, Once  insulin glargine (LANTUS) injection vial 15 Units, 15 Units, Subcutaneous, BID  azithromycin (ZITHROMAX) tablet 500 mg, 500 mg, Oral, Daily  ciprofloxacin (CIPRO) tablet 500 mg, 500 mg, Oral, 2 times per day  albuterol (PROVENTIL) nebulizer solution 2.5 mg, 2.5 mg, Nebulization, Q2H PRN  lisinopril (PRINIVIL;ZESTRIL) tablet 5 mg, 5 mg, Oral, Daily  glucose (GLUTOSE) 40 % oral gel 15 g, 15 g, Oral, PRN  dextrose 50 % solution 12.5 g, 12.5 g, Intravenous, PRN  glucagon (rDNA) injection 1 mg, 1 mg, Intramuscular, PRN  dextrose 5 % solution, 100 mL/hr, Intravenous, PRN  insulin lispro (HUMALOG) injection vial 0-12 Units, 0-12 Units, Subcutaneous, TID WC  insulin lispro (HUMALOG) injection vial 0-6 Units, 0-6 Units, Subcutaneous, Nightly  guaiFENesin (MUCINEX) extended release tablet 600 mg, 600 mg, Oral, BID  sodium chloride flush 0.9 % injection 10 mL, 10 mL, Intravenous, 2 times per day  sodium chloride flush 0.9 % injection 10 mL, 10 mL, Intravenous, PRN  magnesium hydroxide (MILK OF MAGNESIA) 400 MG/5ML suspension 30 mL, 30 mL, Oral, Daily PRN  ondansetron (ZOFRAN) injection 4 mg, 4 mg, Intravenous, Q6H PRN  enoxaparin (LOVENOX) injection 40 mg, 40 mg, Subcutaneous, Daily  labetalol (NORMODYNE;TRANDATE) injection syringe 10 mg, 10 mg, Intravenous, Q10 Min PRN  aspirin EC tablet 81 mg, 81 mg, Oral, Daily  atorvastatin (LIPITOR) tablet 40 mg, 40 mg, Oral, Nightly      Social History:    Social History     Socioeconomic History    Marital status:      Spouse name: Not on file    Number of children: Not on file    Years of education: Not on file    Highest education level: Not on file   Occupational History    Not on file   Social Needs    Financial resource strain: Not on file    Food insecurity:     Worry: Not on file     Inability: Not on file    Transportation needs:     Medical: Not on file     Non-medical: Not on file   Tobacco well-nourished. She appears listless. Non-toxic appearance. She does not have a sickly appearance. She does not appear ill. No distress. HENT:   Head: Normocephalic. Not macrocephalic and not microcephalic. Head is without raccoon's eyes and without Dodd's sign. Mouth/Throat: Oropharyngeal exudate present. Eyes: Pupils are equal, round, and reactive to light. Conjunctivae and EOM are normal. Right eye exhibits no discharge. Left eye exhibits no discharge. No scleral icterus. Neck: Normal range of motion. Normal carotid pulses, no hepatojugular reflux and no JVD present. Spinous process tenderness and muscular tenderness present. Carotid bruit is not present. No tracheal deviation present. No thyromegaly present. Cardiovascular: Exam reveals distant heart sounds. Pulmonary/Chest: Effort normal. No stridor. She has decreased breath sounds. Abdominal: Soft. She exhibits no distension. Bowel sounds are decreased. There is no tenderness. There is no rebound and no guarding. Musculoskeletal:        Cervical back: She exhibits decreased range of motion and tenderness. Thoracic back: She exhibits decreased range of motion and tenderness. Lumbar back: She exhibits decreased range of motion and tenderness. Lymphadenopathy:        Head (right side): No submental and no submandibular adenopathy present. Head (left side): No submental and no submandibular adenopathy present. She has no cervical adenopathy. She has no axillary adenopathy. Neurological: She appears listless. A sensory deficit is present. Coordination and gait abnormal.   Reflex Scores:       Tricep reflexes are 1+ on the right side and 1+ on the left side. Bicep reflexes are 1+ on the right side and 1+ on the left side. Brachioradialis reflexes are 1+ on the right side and 1+ on the left side. Patellar reflexes are 0 on the right side and 0 on the left side.        Achilles reflexes are 0 on the light touch: decreased from knee    Gait, Coordination, and Reflexes     Gait  Gait: wide-based    Reflexes   Right brachioradialis: 1+  Left brachioradialis: 1+  Right biceps: 1+  Left biceps: 1+  Right triceps: 1+  Left triceps: 1+  Right patellar: 0  Left patellar: 0  Right achilles: 0  Left achilles: 0            Diagnostics:    Recent Results (from the past 24 hour(s))   POCT Glucose    Collection Time: 05/21/19 11:23 AM   Result Value Ref Range    POC Glucose 141 (H) 60 - 115 mg/dl    Performed on ACCU-CHEK    POCT Glucose    Collection Time: 05/21/19  4:19 PM   Result Value Ref Range    POC Glucose 117 (H) 60 - 115 mg/dl    Performed on ACCU-CHEK    POCT Glucose    Collection Time: 05/21/19  7:27 PM   Result Value Ref Range    POC Glucose 224 (H) 60 - 115 mg/dl    Performed on ACCU-CHEK    Basic Metabolic Panel    Collection Time: 05/22/19  5:25 AM   Result Value Ref Range    Sodium 137 135 - 144 mEq/L    Potassium 4.7 3.4 - 4.9 mEq/L    Chloride 102 95 - 107 mEq/L    CO2 24 20 - 31 mEq/L    Anion Gap 11 9 - 15 mEq/L    Glucose 343 (H) 70 - 99 mg/dL    BUN 15 8 - 23 mg/dL    CREATININE 0.55 0.50 - 0.90 mg/dL    GFR Non-African American >60.0 >60    GFR  >60.0 >60    Calcium 9.2 8.5 - 9.9 mg/dL   CBC    Collection Time: 05/22/19  5:25 AM   Result Value Ref Range    WBC 10.1 4.8 - 10.8 K/uL    RBC 4.46 4.20 - 5.40 M/uL    Hemoglobin 13.7 12.0 - 16.0 g/dL    Hematocrit 40.0 37.0 - 47.0 %    MCV 89.6 82.0 - 100.0 fL    MCH 30.7 27.0 - 31.3 pg    MCHC 34.2 33.0 - 37.0 %    RDW 13.6 11.5 - 14.5 %    Platelets 219 066 - 448 K/uL   POCT Glucose    Collection Time: 05/22/19  6:55 AM   Result Value Ref Range    POC Glucose 269 (H) 60 - 115 mg/dl    Performed on ACCU-CHEK               Impression:    1. Impaired mobility and ADLs due to CVA a CVA with right-sided weakness versus significant cervical myelopathy  2.  Fatigue and Malaise      Complex Active General Medical Issues thatcomplicate care Assess & Plan:     1. Active Problems:    Cerebrovascular accident (CVA) due to occlusion of small artery    TIA (transient ischemic attack)    Mild intermittent asthma with exacerbation    Shortness of breath    Cerebrovascular accident (CVA) determined by clinical assessment likely ALEXANDER with Right kip ([address]    Urinary incontinence    Right leg weakness    Type 2 diabetes mellitus with hyperglycemia, with long-term current use of insulin (HCC)    Carotid artery plaque, left    Cervical radicular pain    Cervical spinal stenosis    Cervical spondylosis with myelopathy    Orthostatic hypotension    Lumbar and sacral osteoarthritis    Type 2 diabetes mellitus without complication, with long-term current use of insulin (HCC)  Resolved Problems:    Cervical spondylosis without myelopathy            Recommendations:    1. Considering all of the factors aboveincluding the patient's current medical status, social status/home envirnment, their functional needs and their ability to participate in a therapy program, I feel that they would best be served at a skilled level   Rehabilitationlevel of care. I reviewed the varous local options re these levels of care with the patient and family. 2. Vitamin B12 shot times one  3. Improve hydration and Nutrition by adding Proteinex and push PO fluids  4. Repeat MRI of cervical spine to assess the degree of cervical compression. It was my pleasure toevaluate Lucila Jeffery today. Please call [phone] with questions.     Christianne Cazares, DO

## 2019-05-23 LAB
ANION GAP SERPL CALCULATED.3IONS-SCNC: 9 MEQ/L (ref 9–15)
BUN BLDV-MCNC: 17 MG/DL (ref 8–23)
CALCIUM SERPL-MCNC: 9.3 MG/DL (ref 8.5–9.9)
CHLORIDE BLD-SCNC: 104 MEQ/L (ref 95–107)
CO2: 25 MEQ/L (ref 20–31)
CREAT SERPL-MCNC: 0.53 MG/DL (ref 0.5–0.9)
GFR AFRICAN AMERICAN: >60
GFR NON-AFRICAN AMERICAN: >60
GLUCOSE BLD-MCNC: 185 MG/DL (ref 60–115)
GLUCOSE BLD-MCNC: 198 MG/DL (ref 60–115)
GLUCOSE BLD-MCNC: 232 MG/DL (ref 60–115)
GLUCOSE BLD-MCNC: 277 MG/DL (ref 60–115)
GLUCOSE BLD-MCNC: 283 MG/DL (ref 70–99)
GLUCOSE BLD-MCNC: 286 MG/DL (ref 60–115)
HCT VFR BLD CALC: 38 % (ref 37–47)
HEMOGLOBIN: 12.9 G/DL (ref 12–16)
MCH RBC QN AUTO: 30.3 PG (ref 27–31.3)
MCHC RBC AUTO-ENTMCNC: 34 % (ref 33–37)
MCV RBC AUTO: 89.2 FL (ref 82–100)
PDW BLD-RTO: 14 % (ref 11.5–14.5)
PERFORMED ON: ABNORMAL
PLATELET # BLD: 260 K/UL (ref 130–400)
POTASSIUM SERPL-SCNC: 3.9 MEQ/L (ref 3.4–4.9)
RBC # BLD: 4.26 M/UL (ref 4.2–5.4)
SODIUM BLD-SCNC: 138 MEQ/L (ref 135–144)
WBC # BLD: 9.8 K/UL (ref 4.8–10.8)

## 2019-05-23 PROCEDURE — 80048 BASIC METABOLIC PNL TOTAL CA: CPT

## 2019-05-23 PROCEDURE — 6370000000 HC RX 637 (ALT 250 FOR IP): Performed by: INTERNAL MEDICINE

## 2019-05-23 PROCEDURE — 2580000003 HC RX 258: Performed by: INTERNAL MEDICINE

## 2019-05-23 PROCEDURE — 94667 MNPJ CHEST WALL 1ST: CPT

## 2019-05-23 PROCEDURE — 6360000002 HC RX W HCPCS: Performed by: INTERNAL MEDICINE

## 2019-05-23 PROCEDURE — 99231 SBSQ HOSP IP/OBS SF/LOW 25: CPT | Performed by: PHYSICAL MEDICINE & REHABILITATION

## 2019-05-23 PROCEDURE — 85027 COMPLETE CBC AUTOMATED: CPT

## 2019-05-23 PROCEDURE — 6370000000 HC RX 637 (ALT 250 FOR IP): Performed by: PHYSICAL MEDICINE & REHABILITATION

## 2019-05-23 PROCEDURE — 1210000000 HC MED SURG R&B

## 2019-05-23 PROCEDURE — 2700000000 HC OXYGEN THERAPY PER DAY

## 2019-05-23 PROCEDURE — 94640 AIRWAY INHALATION TREATMENT: CPT

## 2019-05-23 PROCEDURE — 97116 GAIT TRAINING THERAPY: CPT

## 2019-05-23 PROCEDURE — 36415 COLL VENOUS BLD VENIPUNCTURE: CPT

## 2019-05-23 RX ORDER — ACETAMINOPHEN 325 MG/1
325 TABLET ORAL EVERY 4 HOURS PRN
Status: DISCONTINUED | OUTPATIENT
Start: 2019-05-23 | End: 2019-05-28 | Stop reason: HOSPADM

## 2019-05-23 RX ORDER — ERGOCALCIFEROL 1.25 MG/1
50000 CAPSULE ORAL WEEKLY
Status: DISCONTINUED | OUTPATIENT
Start: 2019-05-23 | End: 2019-05-28 | Stop reason: HOSPADM

## 2019-05-23 RX ADMIN — CIPROFLOXACIN HYDROCHLORIDE 500 MG: 500 TABLET, FILM COATED ORAL at 08:19

## 2019-05-23 RX ADMIN — IPRATROPIUM BROMIDE AND ALBUTEROL SULFATE 1 AMPULE: 2.5; .5 SOLUTION RESPIRATORY (INHALATION) at 13:33

## 2019-05-23 RX ADMIN — INSULIN LISPRO 6 UNITS: 100 INJECTION, SOLUTION INTRAVENOUS; SUBCUTANEOUS at 08:29

## 2019-05-23 RX ADMIN — VITAMIN D, TAB 1000IU (100/BT) 1000 UNITS: 25 TAB at 12:30

## 2019-05-23 RX ADMIN — AZITHROMYCIN MONOHYDRATE 500 MG: 250 TABLET ORAL at 08:19

## 2019-05-23 RX ADMIN — Medication 10 ML: at 08:20

## 2019-05-23 RX ADMIN — CIPROFLOXACIN HYDROCHLORIDE 500 MG: 500 TABLET, FILM COATED ORAL at 20:48

## 2019-05-23 RX ADMIN — GUAIFENESIN 600 MG: 600 TABLET, EXTENDED RELEASE ORAL at 08:20

## 2019-05-23 RX ADMIN — ENOXAPARIN SODIUM 40 MG: 40 INJECTION SUBCUTANEOUS at 08:20

## 2019-05-23 RX ADMIN — OXYBUTYNIN CHLORIDE 5 MG: 5 TABLET ORAL at 23:14

## 2019-05-23 RX ADMIN — OXYBUTYNIN CHLORIDE 5 MG: 5 TABLET ORAL at 18:13

## 2019-05-23 RX ADMIN — BUDESONIDE 500 MCG: 0.5 INHALANT RESPIRATORY (INHALATION) at 19:11

## 2019-05-23 RX ADMIN — GUAIFENESIN 600 MG: 600 TABLET, EXTENDED RELEASE ORAL at 20:48

## 2019-05-23 RX ADMIN — INSULIN LISPRO 2 UNITS: 100 INJECTION, SOLUTION INTRAVENOUS; SUBCUTANEOUS at 18:14

## 2019-05-23 RX ADMIN — INSULIN GLARGINE 15 UNITS: 100 INJECTION, SOLUTION SUBCUTANEOUS at 08:23

## 2019-05-23 RX ADMIN — OXYBUTYNIN CHLORIDE 5 MG: 5 TABLET ORAL at 08:20

## 2019-05-23 RX ADMIN — INSULIN GLARGINE 15 UNITS: 100 INJECTION, SOLUTION SUBCUTANEOUS at 20:48

## 2019-05-23 RX ADMIN — ERGOCALCIFEROL 50000 UNITS: 1.25 CAPSULE ORAL at 21:13

## 2019-05-23 RX ADMIN — IPRATROPIUM BROMIDE AND ALBUTEROL SULFATE 1 AMPULE: 2.5; .5 SOLUTION RESPIRATORY (INHALATION) at 19:11

## 2019-05-23 RX ADMIN — LISINOPRIL 5 MG: 5 TABLET ORAL at 08:20

## 2019-05-23 RX ADMIN — IPRATROPIUM BROMIDE AND ALBUTEROL SULFATE 1 AMPULE: 2.5; .5 SOLUTION RESPIRATORY (INHALATION) at 07:03

## 2019-05-23 RX ADMIN — BUDESONIDE 500 MCG: 0.5 INHALANT RESPIRATORY (INHALATION) at 13:33

## 2019-05-23 RX ADMIN — INSULIN LISPRO 4 UNITS: 100 INJECTION, SOLUTION INTRAVENOUS; SUBCUTANEOUS at 12:30

## 2019-05-23 RX ADMIN — PREDNISONE 20 MG: 20 TABLET ORAL at 08:19

## 2019-05-23 RX ADMIN — ATORVASTATIN CALCIUM 40 MG: 40 TABLET, FILM COATED ORAL at 20:48

## 2019-05-23 RX ADMIN — ASPIRIN 81 MG: 81 TABLET, COATED ORAL at 08:20

## 2019-05-23 ASSESSMENT — PAIN SCALES - GENERAL
PAINLEVEL_OUTOF10: 0
PAINLEVEL_OUTOF10: 0

## 2019-05-23 ASSESSMENT — ENCOUNTER SYMPTOMS
TROUBLE SWALLOWING: 0
NAUSEA: 0
COUGH: 0
SHORTNESS OF BREATH: 0
WHEEZING: 0
VOMITING: 0
COLOR CHANGE: 0
CHEST TIGHTNESS: 0

## 2019-05-23 NOTE — CARE COORDINATION
5/23/19 I SPOKE TO MARISOL ALLEN TO UPDATE HER ON RE-PRECERT STARTED TODAY AGAIN FOR [facility] WITH CORRECT ADDRESS ON [address]  AND UPDATED 5/22 PT/OT SINCE PT IS MORE UNSTEADY. PT WAS OUT OF NETWORK FOR [facility]. THE ORIGINAL [facility] DENIAL WAS FROM A WRONG ADDRESS AND OLD PT/OT NOTES. SHE ASK THAT I TELL JUNIOR TO FAX INFORMATION TO MARISOL WHITNEY RN (MARGO [phone]) I DID SPEAK WITH LIN FROM JUNIOR AND TOLD HER THE FAX NUMBER. PRECERT STARTED TODAY FOR [facility]. I EXPLAINED ALL THIS TO THE PT AND MAINTAINED FREEDOM OF CHOICE. SHE WOULD LIKE TO GO TO [facility] SHE SAID. SHE IS TO F/U WITH DR NAVA AS OUT PT AFTER REHAB HE SAID. DR BUCKLEY STATED OK FOR DISCHARGE AND TO F/U IN THE OFFICE IN 2 WEEKS. EDIE KEN FOR DR WALLACE AWARE OF THE PLAN.

## 2019-05-23 NOTE — PROGRESS NOTES
significant change in deep tendon reflexes or     sensation  Lungs:  Diminished, CTA-B. Respiration effort is normal at rest.     Heart:   S1 = S2,  RRR. No loud murmurs. Abdomen:  Soft, non-tender, no enlargement of liver or spleen. Extremities:  No significant lower extremity edema or tenderness. Skin:    BUE bruises dt blood draws, no visualized or palpated problems. Rehabilitation:  Physical therapy: FIMS:  Bed Mobility:      Transfers: Sit to Stand: Contact guard assistance  Stand to sit: Contact guard assistance, Ambulation 1  Surface: level tile  Device: Rolling Walker  Assistance: Contact guard assistance  Quality of Gait: significant RLE weakness with foot drop, inconsistent step length, R toe out. no LOB however patient unsteady   Distance: 28' x2   Comments: with turns, VC for safety with negotiating room environement and safety with turning with 2ww,      FIMS:  ,  , Assessment: patient demonstrates foot drop and drags right LE when fatigued. Requires verbal cues for safety with transfers.      Occupational therapy: FIMS:   ,  ,      Speech therapy: FIMS:        Lab/X-ray studies reviewed, analyzed and discussed with patient and staff:   Recent Results (from the past 24 hour(s))   POCT Glucose    Collection Time: 05/22/19  1:12 PM   Result Value Ref Range    POC Glucose 141 (H) 60 - 115 mg/dl    Performed on ACCU-CHEK    POCT Glucose    Collection Time: 05/22/19  4:25 PM   Result Value Ref Range    POC Glucose 177 (H) 60 - 115 mg/dl    Performed on ACCU-CHEK    POCT Glucose    Collection Time: 05/22/19  8:45 PM   Result Value Ref Range    POC Glucose 150 (H) 60 - 115 mg/dl    Performed on ACCU-CHEK    Basic Metabolic Panel    Collection Time: 05/23/19  5:48 AM   Result Value Ref Range    Sodium 138 135 - 144 mEq/L    Potassium 3.9 3.4 - 4.9 mEq/L    Chloride 104 95 - 107 mEq/L    CO2 25 20 - 31 mEq/L    Anion Gap 9 9 - 15 mEq/L    Glucose 283 (H) 70 - 99 mg/dL    BUN 17 8 - 23 mg/dL    CREATININE

## 2019-05-23 NOTE — PROGRESS NOTES
Constitutional: Positive for activity change. Negative for appetite change, chills, fatigue and fever. HENT: Negative for hearing loss and trouble swallowing. Eyes: Negative for visual disturbance. Respiratory: Negative for cough, chest tightness, shortness of breath and wheezing. Cardiovascular: Negative for chest pain, palpitations and leg swelling. Gastrointestinal: Negative for nausea and vomiting. Genitourinary: Positive for difficulty urinating (incontinence). Musculoskeletal: Positive for gait problem. Skin: Negative for color change and rash. Neurological: Positive for weakness (RLE). Negative for dizziness, tremors, seizures, syncope, facial asymmetry, speech difficulty, light-headedness, numbness and headaches. Psychiatric/Behavioral: Negative for agitation, confusion and hallucinations. The patient is not nervous/anxious.         Medications:  Reviewed    Infusion Medications:    dextrose       Scheduled Medications:    ipratropium-albuterol  1 ampule Inhalation TID    oxybutynin  5 mg Oral TID    insulin lispro  10 Units Subcutaneous TID WC    budesonide  0.5 mg Nebulization BID    [START ON 5/24/2019] predniSONE  15 mg Oral Daily    Followed by   Mariposa Humphreys ON 5/27/2019] predniSONE  10 mg Oral Daily    Followed by   Mariposa Humphreys ON 5/30/2019] predniSONE  5 mg Oral Daily    midazolam  4 mg Intravenous Once    fentanNYL  100 mcg Intravenous Once    sodium chloride bacteriostatic  30 mL Injection Once    insulin glargine  15 Units Subcutaneous BID    azithromycin  500 mg Oral Daily    ciprofloxacin  500 mg Oral 2 times per day    lisinopril  5 mg Oral Daily    insulin lispro  0-12 Units Subcutaneous TID WC    insulin lispro  0-6 Units Subcutaneous Nightly    guaiFENesin  600 mg Oral BID    sodium chloride flush  10 mL Intravenous 2 times per day    enoxaparin  40 mg Subcutaneous Daily    aspirin  81 mg Oral Daily    atorvastatin  40 mg Oral Nightly     PRN Meds: lidocaine viscous hcl, albuterol, glucose, dextrose, glucagon (rDNA), dextrose, sodium chloride flush, magnesium hydroxide, ondansetron, labetalol    Labs:   Recent Labs     05/21/19  0522 05/22/19  0525 05/23/19  0549   WBC 8.9 10.1 9.8   HGB 13.6 13.7 12.9   HCT 39.8 40.0 38.0    280 260     Recent Labs     05/21/19  0522 05/22/19  0525 05/23/19  0548    137 138   K 4.3 4.7 3.9    102 104   CO2 25 24 25   BUN 16 15 17   CREATININE 0.58 0.55 0.53   CALCIUM 9.5 9.2 9.3     No results for input(s): AST, ALT, BILIDIR, BILITOT, ALKPHOS in the last 72 hours. No results for input(s): INR in the last 72 hours. No results for input(s): Jose Pheasant in the last 72 hours. Urinalysis:   Lab Results   Component Value Date    NITRU POSITIVE 05/17/2019    WBCUA  05/17/2019    BACTERIA MODERATE 05/17/2019    RBCUA 3-5 05/17/2019    BLOODU Negative 05/17/2019    SPECGRAV 1.038 05/17/2019    GLUCOSEU 250 05/17/2019       Radiology:   Most recent    Chest CT      WITH CONTRAST:No results found for this or any previous visit. WITHOUT CONTRAST: No results found for this or any previous visit. CXR      2-view:   Results for orders placed during the hospital encounter of 05/17/19   XR CHEST STANDARD (2 VW)    Narrative EXAMINATION: XR CHEST (2 VW)    CLINICAL HISTORY: COUGH    COMPARISONS: May 17, 2019. FINDINGS:    Osseous structures intact. Cardiopericardial silhouette normal. Pulmonary vasculature normal. Lungs clear. Impression No acute cardiopulmonary disease. Portable:   Results for orders placed during the hospital encounter of 05/17/19   XR CHEST PORTABLE    Narrative EXAMINATION: XR CHEST PORTABLE    CLINICAL HISTORY:   LOSS OF USE OF RIGHT LEG     COMPARISONS: NOVEMBER 24, 2013    FINDINGS: Osseous structures intact. Cardiopericardial silhouette normal. Pulmonary vasculature normal. Lungs clear. Impression NO ACUTE CARDIOPULMONARY DISEASE.        Echo No results found for this or any previous visit. Assessment/Plan:    RLE weakness  MRI brain no acute process, T2 hyperintense focus periventricular similar to 1/22/11  Carotid US <50% internal bilat, bilat plaque burden at carotid bifurcation,  TTE showed IAS aneurysm, tech difficulty bubble study  LEONID  negative  ASA 81mg daily  Pt with continued RLE weakness and new urinary retention. MRI lumbar spine shows right foraminal stenosis and compression on exiting L4 nerve root   NSGY consult  Suspicious ALEXANDER CVA not seen on MRI given clinical findings of complete weakness of RLE  In addition pt has L4 radiculopathy which can be addressed as outpatient  Pt with MRI cervical spine 2017 at [facility] that showed severe canal stenosis and significant spondylosis with severe narrowing. MRI also showed multifocal areas of T2 abnormalities concerning for demyelinating disease suspicious for MS. Will repeat MRI cervical spine and thoracic spine     MRI seen and reviewed, low suggestion of demyelination disease, Cervical stenosis, moderate, not surgical  Could still be primary relapsing or secondary relapsing MS. Findings only based on comparison MRI done at [facility] and improvement of MRI. No recommended treatment for now. Tapering steroids. Pt unable to go to [facility] rehab due to insurance. Plan for [facility]. Leonel Reyna MD, Olivia Todd, American Board of Psychiatry & Neurology  Board Certified in Vascular Neurology  Board Certified in Neuromuscular Medicine  Certified in Neurorehabilitation           Collaborating physicians: Dr Harinder Reyna, Dr REYNALDO Mendez, Dr Demarco Height    Electronically signed by MAJOR Isaac - CNP on 5/23/2019 at 8:41 AM

## 2019-05-23 NOTE — CONSULTS
Consults     Patient Name: Janice Lorenzo  Admit Date: 2019  6:50 PM  MR #: [id number]  : 1950    Attending Physician: Dariel Figueroa MD  Reason for consult: Spinal stenosis   History of Presenting Illness and Review of Sophia Shafter is a 79 y.o. female on hospital day 4 . History Obtained From:  patient  patient is a 72-year-old female who was admitted with strokelike symptoms with TIA associated with a acute right arm weakness. Which has been resolved thankfully. Patient subsequently underwent multiple simple extensive evaluation including MRI cervical and lumbar spine after which neurosurgery was called. When she was seen by me, she denies any having neck pain so right arm pain or even chronic lower back pain as well. Essentially no complaints of spine issues or weakness other than this strokelike symptoms which has resolved. No preceding injury or trauma. No bowel bladder bladder incontinence. No conservative management.     Patient at this point denies any issues whatsoever at this point   History:      Past Medical History:   Diagnosis Date    Asthma     Bladder infection     Chicken pox     Chronic back pain     Hypertension     Measles     Mumps     Osteoarthritis     Pneumonia     Whooping cough      Past Surgical History:   Procedure Laterality Date    APPENDECTOMY  1978    HYSTERECTOMY  26    TONSILLECTOMY  1966       Family History  Family History   Problem Relation Age of Onset   [facility] [place] Cancer Mother     Cancer Father      [] Unable to obtain due to ventilated and/ or neurologic status    Social History     Socioeconomic History    Marital status:      Spouse name: Not on file    Number of children: Not on file    Years of education: Not on file    Highest education level: Not on file   Occupational History    Not on file   Social Needs    Financial resource strain: Not on file    Food insecurity:     Worry: Not on file     Inability: Not on needed for Pain  HYDROcodone-acetaminophen (NORCO)  MG per tablet, Take 1 tablet by mouth 3 times daily as needed for Pain  HYDROcodone-acetaminophen (NORCO)  MG per tablet, Take 1 tablet by mouth every 8 hours as needed for Pain    Current Hospital Medications:     Scheduled Meds:   vitamin D  1,000 Units Oral Lunch    vitamin D  50,000 Units Oral Weekly    ipratropium-albuterol  1 ampule Inhalation TID    oxybutynin  5 mg Oral TID    insulin lispro  10 Units Subcutaneous TID WC    budesonide  0.5 mg Nebulization BID    [START ON 5/24/2019] predniSONE  15 mg Oral Daily    Followed by   Kathleen Jaime ON 5/27/2019] predniSONE  10 mg Oral Daily    Followed by   Kathleen Jaime ON 5/30/2019] predniSONE  5 mg Oral Daily    midazolam  4 mg Intravenous Once    fentanNYL  100 mcg Intravenous Once    sodium chloride bacteriostatic  30 mL Injection Once    insulin glargine  15 Units Subcutaneous BID    azithromycin  500 mg Oral Daily    ciprofloxacin  500 mg Oral 2 times per day    lisinopril  5 mg Oral Daily    insulin lispro  0-12 Units Subcutaneous TID WC    insulin lispro  0-6 Units Subcutaneous Nightly    guaiFENesin  600 mg Oral BID    sodium chloride flush  10 mL Intravenous 2 times per day    enoxaparin  40 mg Subcutaneous Daily    aspirin  81 mg Oral Daily    atorvastatin  40 mg Oral Nightly     Continuous Infusions:   dextrose       PRN Meds:.acetaminophen, lidocaine viscous hcl, albuterol, glucose, dextrose, glucagon (rDNA), dextrose, sodium chloride flush, magnesium hydroxide, ondansetron, labetalol  .  dextrose          Allergies:     No Known Allergies     Physical Exam     Clinically she is lying in bed comfortably not in acute distress. Appears to be her stated age. Awake alert oriented ×3. Afebrile vital signs stable. No photophobia. Cranial nerves II through XII nonfocal.  Normocephalic atraumatic. Neck is supple no nuchal rigidity.   No Lhermitte sign is atorvastatin  40 mg Oral Nightly     Continuous Infusions:   dextrose         Recent Labs     05/21/19  0522 05/22/19  0525 05/23/19  0549   WBC 8.9 10.1 9.8   HGB 13.6 13.7 12.9   HCT 39.8 40.0 38.0   MCV 89.4 89.6 89.2    280 260     Recent Labs     05/21/19  0522 05/22/19  0525 05/23/19  0548    137 138   K 4.3 4.7 3.9    102 104   CO2 25 24 25   BUN 16 15 17   CREATININE 0.58 0.55 0.53     No results for input(s): AST, ALT, ALB, BILIDIR, BILITOT, ALKPHOS in the last 72 hours. No results for input(s): LIPASE, AMYLASE in the last 72 hours. No results for input(s): PROT, INR in the last 72 hours. Cta Head W Wo Contrast    Result Date: 5/18/2019  CTA head with intravenous contrast medium. HISTORY: Inability to use left leg. Technical factors: CTA head imaging formatted as contiguous axial images through skull base. Sagittal, coronal, right and left oblique, 3-D MIP obtained during postprocessing. Intravenous contrast medium consisting of Isovue-370, 100 mL utilized. Study done in conjunction with CTA neck reported separately. No prior CT head available for comparison. FINDINGS: Bilateral A1 and A2 segments, anterior carotid arteries patent. Anterior communicating artery patent. Communicating segments bilateral internal carotid arteries Bilateral M1 and M2 segments middle cerebral arteries patent. Congenital absence, bilateral communicating arteries. Bilateral P1, P2 segments, posterior cerebral arteries patent. Basilar tip and basilar artery patent. No aneurysm, or obstruction identified. Negative CTA head. All CT scans at this facility use dose modulation, iterative reconstruction, and/or weight based dosing when appropriate to reduce radiation dose to as low as reasonably achievable. Xr Chest Standard (2 Vw)    Result Date: 5/18/2019  EXAMINATION: XR CHEST (2 VW) CLINICAL HISTORY: COUGH COMPARISONS: May 17, 2019. FINDINGS: Osseous structures intact.  Cardiopericardial silhouette Calcification identified carotid bulb yielding 60 % stenosis by NASCET criteria. Remainder cervical segment, as well as petrous, lacerum, clinoid, ophthalmic, cavernous, and communicating segments patent. Right vertebral: Right vertebral artery arises from right subclavian artery. Pre foraminal, foraminal, extradural, and intradural segments patent. Left vertebral: Left vertebral artery arises from left subclavian artery. Pre foraminal, foraminal, extradural, and intradural segments patent. 60 % stenosis left internal carotid artery by NASCET criteria. No obstruction or dissection identified. All CT scans at this facility use dose modulation, iterative reconstruction, and/or weight based dosing when appropriate to reduce radiation dose to as low as reasonably achievable. Mri Lumbar Spine Wo Contrast    Result Date: 5/22/2019  EXAMINATION: MRI LUMBAR SPINE WO CONTRAST: DATE AND TIME: 5/21/2019 at 10:00 AM. CLINICAL HISTORY: LOWER BACK PAIN. WEAKNESS, EXTREMITY (PARAPLEGIA). COMPARISON: None. TECHNIQUE:  Multi-sequence, multiplanar imaging of the lumbar spine was performed. No gadolinium contrast administered. VOLUME: None. MR CONTRAST: None. FINDINGS: Vertebrae: No acute fracture. Marrow signal changes are within normal limits. Conus: The conus medullaris ends at L1 level and is unremarkable. Disc bulging at T11-T12. T12/L1: There is no evidence of disc bulging or disc herniation. No significant facet hypertrophy or thickening of ligamenta flava. There is no central spinal canal stenosis. There is no neural foraminal stenosis. L1/L2:  There is no evidence of disc bulging or disc herniation. No significant facet hypertrophy or thickening of ligamenta flava. There is no central spinal canal stenosis. There is no neural foraminal stenosis. L2/L3:  Mild disc bulging with ventral ridging on the thecal sac. Mild facet arthropathy. No central canal or foraminal narrowing.     L3/L4:  Mild disc space narrowing with broad-based disc bulging and moderate facet arthropathy. Thickening of the ligamenta flava. Findings resulting in mild spinal canal narrowing. No foraminal stenosis. L4/L5:  Broad-based disc bulging and osteophytic spurring off the endplates resulting in ventral ridging on the thecal sac. Changes asymmetric to the right with resulting right foraminal stenosis and compression on the exiting right L4 nerve root. Facet arthropathy combining with disc disease with borderline canal narrowing. No left foraminal stenosis. L5/S1: Broad-based disc bulging/protrusion with osteophytic spurring. Facet hypertrophy. Borderline canal narrowing. There is bilateral foraminal narrowing, left greater than right. Retroperitoneum: No abnormality of the visualized aorta or retroperitoneum. MULTILEVEL DISC DISEASE WITH MILD CANAL NARROWING AT L3-L4. RIGHT L4-L5 FORAMINAL STENOSIS SECONDARY TO DISC OSTEOPHYTE CHANGES/PROTRUSION. L5-S1 MILD FORAMINAL NARROWING, LEFT GREATER THAN RIGHT. Mri Cervical Spine W Wo Contrast    Result Date: 5/22/2019  EXAMINATION: MRI of cervical spine without and with contrast CLINICAL HISTORY: History of demyelinating disease. Recent onset of neck pain and upper extremity weakness. Comparison with previous MR of December 8, 2010. FINDINGS: T1 and T2-weighted sagittal images, T2 and gradient echo axial images, a STIR sagittal sequence, and postcontrast fat sat T1-weighted images were acquired. Patient received 20 mL of ProHance. A focus of abnormal increasing signal in the cervical cord at the C2 level is noted and unchanged. No other areas of abnormal signal in the cervical cord. No enhancement within the cervical cord following gadolinium injection. There is a broadbase shallow left lateral disc protrusion at C5-C6 effacing the thecal sac in the midline and slightly compressing the cord and moderately narrowing the central canal, left side greater than right.  There is narrowing of the left C6 nerve root foramen. This is similar to the findings on the MR of December 8, 2010. At C6-C7 generalized bulging of the annulus and proliferative changes posteriorly effacing the thecal sac in the midline with moderate central canal stenosis. This has developed since 2010. No significant extradural defect at C2-C3, C3-C4, C4-C5, or C7-T1. Following the injection of gadolinium no suspicious enhancement within the cervical cord, the bone, or the paraspinal soft tissue. RESIDUAL INCREASED SIGNAL IN THE CERVICAL CORD AT C2. DECREASED SIGNAL IN THE CORD AT THE C4-C5 LEVEL COMPARED TO DECEMBER 8, 2010. DISC PROTRUSION AT C5-C6 LEFT SIDE WITH MODERATE CENTRAL CANAL AND LEFT C6 NERVE ROOT FORAMINAL STENOSIS UNCHANGED. MORE PROMINENT BULGING DISC AT C6-C7 WITH MODERATE CENTRAL CANAL STENOSIS WHICH HAS PROGRESSED SINCE 2010. NO ENHANCING LESIONS IN THE CENTRAL CANAL, CERVICAL CORD, OR BONE. EXAMINATION: MRI thoracic spine without and with contrast CLINICAL HISTORY: History of demyelinating disease and syrinx. Mid back pain. FINDINGS: T1 and T2-weighted sagittal and axial sequences and a STIR sagittal sequence were acquired prior to contrast. Precontrast diffusion-weighted images were also included. Fat sat postcontrast T1-weighted sequences were also obtained. No additional  IV contrast given other than the original 20 mL of ProHance described in the cervical report above. No suspicious abnormal signal in the thoracic cord. No evidence of syrinx. Following the injection of gadolinium there was no suspicious enhancement within the cervical cord. There is no restricted diffusion in the cord or in the thoracic vertebrae. Central canal is adequate in dimension. There is a small broadbase disc protrusion at T10-T11 which slightly effaces the thecal sac anteriorly without compressing or displacing the cord or nerve roots. Prevertebral and paraspinal soft tissues are unremarkable.  IMPRESSION: NO EVIDENCE OF DEMYELINATING DISEASE OR INTRAMEDULLARY LESION OR SYRINX. NO EVIDENCE OF SIGNIFICANT CENTRAL CANAL STENOSIS OR CORD COMPRESSION. NO ENHANCING MASS IN THE CENTRAL CANAL, SOFT TISSUE, OR THORACIC VERTEBRAE. Mri Thoracic Spine W Wo Contrast    Result Date: 5/22/2019  EXAMINATION: MRI of cervical spine without and with contrast CLINICAL HISTORY: History of demyelinating disease. Recent onset of neck pain and upper extremity weakness. Comparison with previous MR of December 8, 2010. FINDINGS: T1 and T2-weighted sagittal images, T2 and gradient echo axial images, a STIR sagittal sequence, and postcontrast fat sat T1-weighted images were acquired. Patient received 20 mL of ProHance. A focus of abnormal increasing signal in the cervical cord at the C2 level is noted and unchanged. No other areas of abnormal signal in the cervical cord. No enhancement within the cervical cord following gadolinium injection. There is a broadbase shallow left lateral disc protrusion at C5-C6 effacing the thecal sac in the midline and slightly compressing the cord and moderately narrowing the central canal, left side greater than right. There is narrowing of the left C6 nerve root foramen. This is similar to the findings on the MR of December 8, 2010. At C6-C7 generalized bulging of the annulus and proliferative changes posteriorly effacing the thecal sac in the midline with moderate central canal stenosis. This has developed since 2010. No significant extradural defect at C2-C3, C3-C4, C4-C5, or C7-T1. Following the injection of gadolinium no suspicious enhancement within the cervical cord, the bone, or the paraspinal soft tissue. RESIDUAL INCREASED SIGNAL IN THE CERVICAL CORD AT C2. DECREASED SIGNAL IN THE CORD AT THE C4-C5 LEVEL COMPARED TO DECEMBER 8, 2010. DISC PROTRUSION AT C5-C6 LEFT SIDE WITH MODERATE CENTRAL CANAL AND LEFT C6 NERVE ROOT FORAMINAL STENOSIS UNCHANGED.  MORE PROMINENT BULGING DISC AT C6-C7 WITH MODERATE CENTRAL CANAL STENOSIS WHICH HAS PROGRESSED SINCE 2010. NO ENHANCING LESIONS IN THE CENTRAL CANAL, CERVICAL CORD, OR BONE. EXAMINATION: MRI thoracic spine without and with contrast CLINICAL HISTORY: History of demyelinating disease and syrinx. Mid back pain. FINDINGS: T1 and T2-weighted sagittal and axial sequences and a STIR sagittal sequence were acquired prior to contrast. Precontrast diffusion-weighted images were also included. Fat sat postcontrast T1-weighted sequences were also obtained. No additional  IV contrast given other than the original 20 mL of ProHance described in the cervical report above. No suspicious abnormal signal in the thoracic cord. No evidence of syrinx. Following the injection of gadolinium there was no suspicious enhancement within the cervical cord. There is no restricted diffusion in the cord or in the thoracic vertebrae. Central canal is adequate in dimension. There is a small broadbase disc protrusion at T10-T11 which slightly effaces the thecal sac anteriorly without compressing or displacing the cord or nerve roots. Prevertebral and paraspinal soft tissues are unremarkable. IMPRESSION: NO EVIDENCE OF DEMYELINATING DISEASE OR INTRAMEDULLARY LESION OR SYRINX. NO EVIDENCE OF SIGNIFICANT CENTRAL CANAL STENOSIS OR CORD COMPRESSION. NO ENHANCING MASS IN THE CENTRAL CANAL, SOFT TISSUE, OR THORACIC VERTEBRAE. Xr Chest Portable    Result Date: 5/18/2019  EXAMINATION: XR CHEST PORTABLE CLINICAL HISTORY:   LOSS OF USE OF RIGHT LEG COMPARISONS: NOVEMBER 24, 2013 FINDINGS: Osseous structures intact. Cardiopericardial silhouette normal. Pulmonary vasculature normal. Lungs clear. NO ACUTE CARDIOPULMONARY DISEASE. Mri Brain Wo Contrast    Result Date: 5/18/2019  EXAMINATION:  MRI BRAIN WITHOUT IV CONTRAST CLINICAL HISTORY:  RIGHT LOWER EXTREMITY WEAKNESS, EVALUATE FOR POSSIBLE TIA OR CVA COMPARISON:  5/17/2019 CT BRAIN TECHNIQUE:  Multisequence and multiplane MRI brain is obtained without IV gadolinium.  FINDINGS:  There is cerebral atrophy and age related findings in the brain. There is relatively extensive patchy T2 hyperintense foci in the periventricular white matter of both cerebral hemispheres somewhat similar to the 1/22/2011 MRI brain. Differential considerations include a manifestation of small vessel disease or microangiopathy and demyelinating disease. There is no restricted diffusion or MRI evidence of an acute or subacute cerebral infarction. No abnormal paramagnetic signal in the  brain. There is no intracranial mass, hemorrhage, \"mass effect\" or midline shift. There is mucosal thickening in the maxillary and ethmoid sinuses suggesting acute or chronic sinusitis. Mastoids appear clear. 1. CEREBRAL ATROPHY AND AGE RELATED FINDINGS IN THE BRAIN. 2. NO RESTRICTED DIFFUSION OR MRI EVIDENCE OF AN ACUTE OR SUBACUTE CEREBRAL INFARCTION. 3. RELATIVELY EXTENSIVE PATCHY T2 HYPERINTENSE FOCI IN PERIVENTRICULAR WHITE MATTER OF BOTH CEREBRAL HEMISPHERES SIMILAR TO THE 1/22/2011 MRI BRAIN. DIFFERENTIAL CONSIDERATIONS INCLUDE A MANIFESTATION OF SMALL VESSEL DISEASE OR MICROANGIOPATHY AND DEMYELINATING DISEASE. 4. NO INTRACRANIAL MASS, HEMORRHAGE, \"MASS EFFECT\" OR MIDLINE SHIFT.      Us Carotid Artery Bilateral    Result Date: 5/18/2019  BILATERAL DUPLEX CAROTID ULTRASOUND CLINICAL INFORMATION:  RIGHT LOWER EXTREMITY WEAKNESS, POSSIBLE TIA OR CVA, INVESTIGATE POSSIBLE CAROTID ARTERY STENOSIS IN THE NECK COMPARISON:  NONE AVAILABLE FINDINGS:  The bilateral carotid duplex ultrasound study demonstrates the following:                                                                  RIGHT            LEFT Carotid plaque burden                           mild               moderate Proximal common carotid artery           81 cm/s            89 cm/s  Mid common carotid artery                   67 cm/s            73 cm/s  Distal common carotid artery                60 cm/s           46 cm/s Proximal internal carotid artery             53

## 2019-05-23 NOTE — DISCHARGE SUMMARY
Physician Discharge Summary     Patient ID:  Yury Almaraz  [id number]  33 y.o.  1950    Admit date: 5/17/2019    Discharge date and time:5/23/19    Admitting Physician: Bhavna Givens DO     Discharge Physician: [facility]    Admission Diagnoses: TIA (transient ischemic attack) [G45.9]  TIA (transient ischemic attack) [G45.9]    Discharge Diagnoses: Acute COPD exacerbation  1)  copd exacerbation  improving  Taper steroids    duonebs    2) RLE weakness better, TI A ruled out vs MS exacerbation vs lumbar radiculopathy  LEONID negative for thrombus and vegetation     UTI, Klebselia  - urine culture positive for GNB, continue ciprp fiish the course  NIDDM2 with hyperglycemia  Increase lantus     4) acute urinary re tension   resovled  5) urge incontinence  Started oxybutein  6) foraminal stenosis, lumbar   FU neurosurgery  neurosugery as outpt    Admission Condition: fair    Discharged Condition: stable    Indication for Admission: The patient is a 79 y.o. female with a history of HTN, DLD, DMII, Asthma presenting from home with RLE weakness. Pt took a nap around 2pm, woke up around 530 with RLE weakness. She was not able to walk. No visual changes. No sensory deficit. She was not able to walk around her house so she called her brother to bring her to ED. Hospital Course: MRI of cervical : MRI lumbar, MRI of brain, CT head, pt/ot. Urine cx, IV abx, duonbes  NO EVIDENCE OF DEMYELINATING DISEASE OR INTRAMEDULLARY LESION OR SYRINX. NO EVIDENCE OF SIGNIFICANT CENTRAL CANAL STENOSIS OR CORD COMPRESSION.       NO ENHANCING MASS IN THE CENTRAL CANAL, SOFT TISSUE, OR THORACIC VERTEBRAE. MRI of lumbar:   MULTILEVEL DISC DISEASE WITH MILD CANAL NARROWING AT L3-L4. RIGHT L4-L5 FORAMINAL STENOSIS SECONDARY TO DISC OSTEOPHYTE CHANGES/PROTRUSION.  L5-S1 MILD FORAMINAL NARROWING, LEFT GREATER THAN RIGHT.             Consults: pulm, neurosurgery, neurology    Significant Diagnostic Studies:   Lab Results   Component Value Date  insulin glargine (LANTUS) injection vial 15 Units  15 Units Subcutaneous BID John Perez MD   15 Units at 05/23/19 0823    azithromycin (ZITHROMAX) tablet 500 mg  500 mg Oral Daily John Perez MD   500 mg at 05/23/19 0819    ciprofloxacin (CIPRO) tablet 500 mg  500 mg Oral 2 times per day Carlos Vora MD   500 mg at 05/23/19 0819    albuterol (PROVENTIL) nebulizer solution 2.5 mg  2.5 mg Nebulization Q2H PRN Theodoro Mallet Sedar, DO   2.5 mg at 05/19/19 0231    lisinopril (PRINIVIL;ZESTRIL) tablet 5 mg  5 mg Oral Daily Troy SEGUNDO Gould, DO   5 mg at 05/23/19 0820    glucose (GLUTOSE) 40 % oral gel 15 g  15 g Oral PRN Theodoro Mallet Sedar, DO        dextrose 50 % solution 12.5 g  12.5 g Intravenous PRN Theodoro Mallet Sedar, DO        glucagon (rDNA) injection 1 mg  1 mg Intramuscular PRN Theodoro Mallet Sedar, DO        dextrose 5 % solution  100 mL/hr Intravenous PRN Theodoro Mallet Sedar, DO        insulin lispro (HUMALOG) injection vial 0-12 Units  0-12 Units Subcutaneous TID WC Theodoro Mallet Sedar, DO   6 Units at 05/23/19 0829    insulin lispro (HUMALOG) injection vial 0-6 Units  0-6 Units Subcutaneous Nightly Theodoro Mallet Sedar, DO   1 Units at 05/22/19 2047    guaiFENesin [facility]) extended release tablet 600 mg  600 mg Oral BID Theodoro Mallet Sedar, DO   600 mg at 05/23/19 0820    sodium chloride flush 0.9 % injection 10 mL  10 mL Intravenous 2 times per day Onita Loud D Sedar, DO   10 mL at 05/23/19 0820    sodium chloride flush 0.9 % injection 10 mL  10 mL Intravenous PRN Onita Loud D Sedar, DO        magnesium hydroxide (MILK OF MAGNESIA) 400 MG/5ML suspension 30 mL  30 mL Oral Daily PRN Theodoro Mallet Sedar, DO        ondansetron [facility]) injection 4 mg  4 mg Intravenous Q6H PRN Theodoro Mallet Sedar, DO        enoxaparin (LOVENOX) injection 40 mg  40 mg Subcutaneous Daily Onita Loud D Sedar, DO   40 mg at 05/23/19 0820    labetalol (NORMODYNE;TRANDATE) injection syringe 10 mg  10 mg Intravenous Q10 Min PRN Theodoro Mallet Sedar, DO        aspirin EC tablet 81 mg  81 mg Oral Daily Awanda Muscat Sedar, DO   81 mg at 05/23/19 0820    atorvastatin (LIPITOR) tablet 40 mg  40 mg Oral Nightly Awanda Muscat Sedar, DO   40 mg at 05/22/19 2046         Discharge Exam:  HEENT: AT/NC, PERRLA, no JVD  HEART: s1/s2 wnl w/o s3  LUNG: clear  ABD: soft, NT  EXT: no edema  SKin : no rash  Neuro:+ weakness in LE, ATAxia      Disposition: SNIF  Patient Instructions:      Activity: as tolerated, fall precautions  Diet: diabetic diet      Follow-up with PCP in 1 week  Overtime on dc summary was 45 min    Signed:  Rishi Bundy  5/23/2019  11:21 AM

## 2019-05-23 NOTE — PROGRESS NOTES
1000- patient taken off 02 and SPO2 remains WNL; Lavern Mckenzie was taken off patient to trial if she was able to void on own. 1040-patient voids in BSC; post void bladder scan done immediately following with 300mL retained in bladder. Patient states that she does not feel like she has to urinate more at this time. 1045-Dr. Gonzales Pearson notified that patient had 300mL post void residual per bladder scan and due to no symptoms he did not want a andrews placed. He stated that if patient is unable to void in 8 hours then she would need a andrews cath. **patient able to void within the 8 hours.

## 2019-05-23 NOTE — PROGRESS NOTES
Physical Therapy Med Surg Daily Treatment Note  Facility/Department: [address] NEURO  Room: N225/N2[facility]       NAME: Victoriano Abraham  : 1950 (79 y.o.)  MRN: [id number]  CODE STATUS: Full Code    Date of Service: 2019    Patient Diagnosis(es): TIA (transient ischemic attack) [G45.9]  TIA (transient ischemic attack) [G45.9]   Chief Complaint   Patient presents with    Extremity Weakness     right leg weakness, woke up from a nap at 1730 today and right leg is weak     Patient Active Problem List    Diagnosis Date Noted    Cerebrovascular accident (CVA) due to occlusion of small artery      Priority: High    Cerebrovascular accident (CVA) determined by clinical assessment likely ALEXANDER with Right kip ([address] 2019    Urinary incontinence 2019    Right leg weakness 2019    Type 2 diabetes mellitus with hyperglycemia, with long-term current use of insulin ([address] 2019    Mild intermittent asthma with exacerbation     Shortness of breath     TIA (transient ischemic attack) 2019    Right foot drop 02/15/2019    Mixed hyperlipidemia 2018    Chronic obstructive pulmonary disease ([address] 2018    Carotid artery plaque, left 2017    Cervical spinal stenosis 2017    Orthostatic hypotension 2017    Mild intermittent asthma without complication     Temporary cerebral vascular dysfunction 2017    Type 2 diabetes mellitus with vascular disease ([address] 2017    Type 2 diabetes mellitus without complication, with long-term current use of insulin ([address] 2017    Cervical radicular pain 2017    Essential hypertension 2017    Lumbosacral spondylosis without myelopathy 2015    Lumbar and sacral osteoarthritis 2015    Cervical spondylosis with myelopathy 2007        Past Medical History:   Diagnosis Date    Asthma     Bladder infection     Chicken pox     Chronic back pain     Hypertension     Measles  Mumps     Osteoarthritis     Pneumonia     Whooping cough      Past Surgical History:   Procedure Laterality Date    APPENDECTOMY  1978    HYSTERECTOMY  1983    TONSILLECTOMY  1966          Restrictions:  Restrictions/Precautions: Fall Risk    SUBJECTIVE:  General  Chart Reviewed: Yes  Family / Caregiver Present: No  Subjective  Subjective: They've been letting me get to and from this little toilet. Pre Pain Assessment:  Pre Treatment Pain Screening  Pain at present: 0  Scale Used: Numeric Score  Intervention List: Patient able to continue with treatment  Pain Screening  Patient Currently in Pain: No       Post Pain Assessment:   Pain Assessment  Pain Assessment: 0-10  Pain Level: 0       OBJECTIVE:         Bed mobility  Comment: DNT pt in chair before and after tx. Transfers  Sit to Stand: Stand by assistance  Stand to sit: Stand by assistance  Comment: improved technique compared to yesterday. Ambulation  Ambulation?: Yes  Ambulation 1  Surface: level tile  Device: Rolling Walker  Assistance: Contact guard assistance  Quality of Gait: RLE weakness with foot drop, inconsistent step length, R toe out. Distance: 39' x2           Exercises  Hip Flexion: x 15  Hip Abduction: x 15 abd/add  Knee Long Arc Quad: x 15  Ankle Pumps: x 20 (drop foot on R)   Comments: pt still with RLE weakness. ASSESSMENT:  Body structures, Functions, Activity limitations: Decreased functional mobility ; Decreased ROM; Decreased balance;Decreased strength;Decreased high-level IADLs    Assessment: pt with slightly improved gait and able to increase distance without issue.      Activity Tolerance  Activity Tolerance: Patient Tolerated treatment well       Discharge Recommendations:  Continue to assess pending progress    Goals:  Long term goals  Long term goal 1: indep with HEP to improve R LE strength  Long term goal 2: bed mobility with indep   Long term goal 3: functional transfers with mod I  Long

## 2019-05-23 NOTE — PROGRESS NOTES
GFRAA >60.0 >60.0       MV Settings:          No results for input(s): PHART, NFC5BPH, PO2ART, DLS9XUV, BEART, J8ZDBFMM in the last 72 hours. O2 Device: Nasal cannula  O2 Flow Rate (L/min): 2 L/min    DIET CARDIAC; Carb Control: 3 carb choices (45 gms)/meal     MEDICATIONS during current hospitalization:    Continuous Infusions:   dextrose         Scheduled Meds:   vitamin D  1,000 Units Oral Lunch    vitamin D  50,000 Units Oral Weekly    ipratropium-albuterol  1 ampule Inhalation TID    oxybutynin  5 mg Oral TID    insulin lispro  10 Units Subcutaneous TID WC    budesonide  0.5 mg Nebulization BID    [START ON 5/24/2019] predniSONE  15 mg Oral Daily    Followed by   Richa Lizama ON 5/27/2019] predniSONE  10 mg Oral Daily    Followed by   Richa Lizama ON 5/30/2019] predniSONE  5 mg Oral Daily    midazolam  4 mg Intravenous Once    fentanNYL  100 mcg Intravenous Once    sodium chloride bacteriostatic  30 mL Injection Once    insulin glargine  15 Units Subcutaneous BID    azithromycin  500 mg Oral Daily    ciprofloxacin  500 mg Oral 2 times per day    lisinopril  5 mg Oral Daily    insulin lispro  0-12 Units Subcutaneous TID WC    insulin lispro  0-6 Units Subcutaneous Nightly    guaiFENesin  600 mg Oral BID    sodium chloride flush  10 mL Intravenous 2 times per day    enoxaparin  40 mg Subcutaneous Daily    aspirin  81 mg Oral Daily    atorvastatin  40 mg Oral Nightly       PRN Meds:acetaminophen, lidocaine viscous hcl, albuterol, glucose, dextrose, glucagon (rDNA), dextrose, sodium chloride flush, magnesium hydroxide, ondansetron, labetalol    Radiology  Cta Head W Wo Contrast    Result Date: 5/18/2019  CTA head with intravenous contrast medium. HISTORY: Inability to use left leg. Technical factors: CTA head imaging formatted as contiguous axial images through skull base. Sagittal, coronal, right and left oblique, 3-D MIP obtained during postprocessing.  Intravenous contrast medium consisting of neck with intravenous contrast medium. HISTORY: Unable to move right leg. Technical factors: CTA neck obtained and formatted as contiguous axial images from aortic arch to skull base. Sagittal, coronal, overlap, right and left anterior oblique, and 3-D MIP reconstruction obtained during postprocessing. Intravenous contrast medium, consisting of Isovue-370, 100 mL identified. Study is done in conjunction with CTA head, reported separately. No prior CTA neck images available for comparison. FINDINGS: Right carotid: Right common carotid artery arises from brachiocephalic trunk. Ostium patent. Course and caliber right common carotid artery without anomaly. Carotid bifurcation without anomaly. Cervical segment patent but tortuous. Petrous, lacerum, clinoid, ophthalmic, cavernous, and communicating segments patent. Left carotid: Left common carotid artery arises from aortic arch. Ostium patent. Course and caliber left common carotid artery without anomaly. Calcification identified carotid bulb yielding 60 % stenosis by NASCET criteria. Remainder cervical segment, as well as petrous, lacerum, clinoid, ophthalmic, cavernous, and communicating segments patent. Right vertebral: Right vertebral artery arises from right subclavian artery. Pre foraminal, foraminal, extradural, and intradural segments patent. Left vertebral: Left vertebral artery arises from left subclavian artery. Pre foraminal, foraminal, extradural, and intradural segments patent. 60 % stenosis left internal carotid artery by NASCET criteria. No obstruction or dissection identified. All CT scans at this facility use dose modulation, iterative reconstruction, and/or weight based dosing when appropriate to reduce radiation dose to as low as reasonably achievable. Mri Lumbar Spine Wo Contrast    Result Date: 5/22/2019  EXAMINATION: MRI LUMBAR SPINE WO CONTRAST: DATE AND TIME: 5/21/2019 at 10:00 AM. CLINICAL HISTORY: LOWER BACK PAIN.   [facility] RIGHT. Xr Chest Portable    Result Date: 5/18/2019  EXAMINATION: XR CHEST PORTABLE CLINICAL HISTORY:   LOSS OF USE OF RIGHT LEG COMPARISONS: NOVEMBER 24, 2013 FINDINGS: Osseous structures intact. Cardiopericardial silhouette normal. Pulmonary vasculature normal. Lungs clear. NO ACUTE CARDIOPULMONARY DISEASE. Mri Brain Wo Contrast    Result Date: 5/18/2019  EXAMINATION:  MRI BRAIN WITHOUT IV CONTRAST CLINICAL HISTORY:  RIGHT LOWER EXTREMITY WEAKNESS, EVALUATE FOR POSSIBLE TIA OR CVA COMPARISON:  5/17/2019 CT BRAIN TECHNIQUE:  Multisequence and multiplane MRI brain is obtained without IV gadolinium. FINDINGS:  There is cerebral atrophy and age related findings in the brain. There is relatively extensive patchy T2 hyperintense foci in the periventricular white matter of both cerebral hemispheres somewhat similar to the 1/22/2011 MRI brain. Differential considerations include a manifestation of small vessel disease or microangiopathy and demyelinating disease. There is no restricted diffusion or MRI evidence of an acute or subacute cerebral infarction. No abnormal paramagnetic signal in the  brain. There is no intracranial mass, hemorrhage, \"mass effect\" or midline shift. There is mucosal thickening in the maxillary and ethmoid sinuses suggesting acute or chronic sinusitis. Mastoids appear clear. 1. CEREBRAL ATROPHY AND AGE RELATED FINDINGS IN THE BRAIN. 2. NO RESTRICTED DIFFUSION OR MRI EVIDENCE OF AN ACUTE OR SUBACUTE CEREBRAL INFARCTION. 3. RELATIVELY EXTENSIVE PATCHY T2 HYPERINTENSE FOCI IN PERIVENTRICULAR WHITE MATTER OF BOTH CEREBRAL HEMISPHERES SIMILAR TO THE 1/22/2011 MRI BRAIN. DIFFERENTIAL CONSIDERATIONS INCLUDE A MANIFESTATION OF SMALL VESSEL DISEASE OR MICROANGIOPATHY AND DEMYELINATING DISEASE. 4. NO INTRACRANIAL MASS, HEMORRHAGE, \"MASS EFFECT\" OR MIDLINE SHIFT. IMPRESSION AND SUGGESTION:    1. Asthma with mild exacerbation, improving   2. Shortness of breath secondary to above  3.  Rt. Leg weakness   4. Diabetes mellitus  5. Hypertension    She is on Nebulizers which DuoNeb 4 times a day and albuterol every 2 hours when necessary. . Pulmicort 0.5 mg twice a day. Mucinex 600 mg twice a day. on prednisone . Ok to d/c.  F/u in office 2 weeks       Electronically signed by Sidra Issa MD, FCCP on 5/23/2019 at 12:09 PM

## 2019-05-23 NOTE — CARE COORDINATION
Pt will go to [employer] when precert approval is received. Elisa Marmolejo starting precert now.

## 2019-05-24 LAB
GLUCOSE BLD-MCNC: 114 MG/DL (ref 60–115)
GLUCOSE BLD-MCNC: 138 MG/DL (ref 60–115)
GLUCOSE BLD-MCNC: 151 MG/DL (ref 60–115)
GLUCOSE BLD-MCNC: 189 MG/DL (ref 60–115)
PERFORMED ON: ABNORMAL
PERFORMED ON: NORMAL

## 2019-05-24 PROCEDURE — 2580000003 HC RX 258: Performed by: INTERNAL MEDICINE

## 2019-05-24 PROCEDURE — 6370000000 HC RX 637 (ALT 250 FOR IP): Performed by: INTERNAL MEDICINE

## 2019-05-24 PROCEDURE — 6360000002 HC RX W HCPCS: Performed by: INTERNAL MEDICINE

## 2019-05-24 PROCEDURE — 2700000000 HC OXYGEN THERAPY PER DAY

## 2019-05-24 PROCEDURE — 93010 ELECTROCARDIOGRAM REPORT: CPT | Performed by: INTERNAL MEDICINE

## 2019-05-24 PROCEDURE — 1210000000 HC MED SURG R&B

## 2019-05-24 PROCEDURE — 94640 AIRWAY INHALATION TREATMENT: CPT

## 2019-05-24 PROCEDURE — 6370000000 HC RX 637 (ALT 250 FOR IP): Performed by: PHYSICAL MEDICINE & REHABILITATION

## 2019-05-24 RX ADMIN — GUAIFENESIN 600 MG: 600 TABLET, EXTENDED RELEASE ORAL at 09:42

## 2019-05-24 RX ADMIN — AZITHROMYCIN MONOHYDRATE 500 MG: 250 TABLET ORAL at 09:43

## 2019-05-24 RX ADMIN — BUDESONIDE 500 MCG: 0.5 INHALANT RESPIRATORY (INHALATION) at 07:17

## 2019-05-24 RX ADMIN — IPRATROPIUM BROMIDE AND ALBUTEROL SULFATE 1 AMPULE: 2.5; .5 SOLUTION RESPIRATORY (INHALATION) at 19:59

## 2019-05-24 RX ADMIN — INSULIN GLARGINE 15 UNITS: 100 INJECTION, SOLUTION SUBCUTANEOUS at 20:20

## 2019-05-24 RX ADMIN — ASPIRIN 81 MG: 81 TABLET, COATED ORAL at 09:42

## 2019-05-24 RX ADMIN — PREDNISONE 15 MG: 5 TABLET ORAL at 09:42

## 2019-05-24 RX ADMIN — Medication 10 ML: at 09:48

## 2019-05-24 RX ADMIN — CIPROFLOXACIN HYDROCHLORIDE 500 MG: 500 TABLET, FILM COATED ORAL at 09:42

## 2019-05-24 RX ADMIN — LISINOPRIL 5 MG: 5 TABLET ORAL at 09:42

## 2019-05-24 RX ADMIN — OXYBUTYNIN CHLORIDE 5 MG: 5 TABLET ORAL at 20:19

## 2019-05-24 RX ADMIN — OXYBUTYNIN CHLORIDE 5 MG: 5 TABLET ORAL at 16:12

## 2019-05-24 RX ADMIN — CIPROFLOXACIN HYDROCHLORIDE 500 MG: 500 TABLET, FILM COATED ORAL at 20:19

## 2019-05-24 RX ADMIN — INSULIN LISPRO 2 UNITS: 100 INJECTION, SOLUTION INTRAVENOUS; SUBCUTANEOUS at 12:30

## 2019-05-24 RX ADMIN — IPRATROPIUM BROMIDE AND ALBUTEROL SULFATE 1 AMPULE: 2.5; .5 SOLUTION RESPIRATORY (INHALATION) at 07:17

## 2019-05-24 RX ADMIN — VITAMIN D, TAB 1000IU (100/BT) 1000 UNITS: 25 TAB at 16:12

## 2019-05-24 RX ADMIN — IPRATROPIUM BROMIDE AND ALBUTEROL SULFATE 1 AMPULE: 2.5; .5 SOLUTION RESPIRATORY (INHALATION) at 12:10

## 2019-05-24 RX ADMIN — ATORVASTATIN CALCIUM 40 MG: 40 TABLET, FILM COATED ORAL at 20:19

## 2019-05-24 RX ADMIN — BUDESONIDE 500 MCG: 0.5 INHALANT RESPIRATORY (INHALATION) at 20:05

## 2019-05-24 RX ADMIN — ENOXAPARIN SODIUM 40 MG: 40 INJECTION SUBCUTANEOUS at 09:43

## 2019-05-24 RX ADMIN — OXYBUTYNIN CHLORIDE 5 MG: 5 TABLET ORAL at 09:43

## 2019-05-24 RX ADMIN — INSULIN GLARGINE 15 UNITS: 100 INJECTION, SOLUTION SUBCUTANEOUS at 09:43

## 2019-05-24 RX ADMIN — GUAIFENESIN 600 MG: 600 TABLET, EXTENDED RELEASE ORAL at 20:19

## 2019-05-24 ASSESSMENT — ENCOUNTER SYMPTOMS
TROUBLE SWALLOWING: 0
NAUSEA: 0
WHEEZING: 0
DIARRHEA: 0
VOMITING: 0
CHEST TIGHTNESS: 0
SHORTNESS OF BREATH: 0
COUGH: 0
COLOR CHANGE: 0

## 2019-05-24 ASSESSMENT — PAIN SCALES - GENERAL: PAINLEVEL_OUTOF10: 0

## 2019-05-24 NOTE — CARE COORDINATION
I MET WITH PATIENT DURING QUALITY ROUNDS. CONFIRMED DC PLAN OF [facility] [place]. WAITING FOR PRECERT.   CARE TEAM FOLLOWING

## 2019-05-24 NOTE — PROGRESS NOTES
Asad Gibson is a 79 y.o. female patient.  Pt was seen and evaluated,no overnight events, no new complaints    Current Facility-Administered Medications   Medication Dose Route Frequency Provider Last Rate Last Dose    acetaminophen (TYLENOL) tablet 325 mg  325 mg Oral Q4H PRN Kirti Scullin, DO        vitamin D (CHOLECALCIFEROL) tablet 1,000 Units  1,000 Units Oral Lunch Kirti Scullin, DO   1,000 Units at 05/24/19 1612    vitamin D (ERGOCALCIFEROL) capsule 50,000 Units  50,000 Units Oral Weekly Kirti Scullin, DO   50,000 Units at 05/23/19 2113    ipratropium-albuterol (DUONEB) nebulizer solution 1 ampule  1 ampule Inhalation TID Joey Patterson MD   1 ampule at 05/24/19 1210    oxybutynin (DITROPAN) tablet 5 mg  5 mg Oral TID Shea Vazquez MD   5 mg at 05/24/19 1612    insulin lispro (HUMALOG) injection vial 10 Units  10 Units Subcutaneous TID WC Shea Vazquez MD   10 Units at 05/24/19 1228    budesonide (PULMICORT) nebulizer suspension 500 mcg  0.5 mg Nebulization BID Shea Vazquez MD   500 mcg at 05/24/19 0717    predniSONE (DELTASONE) tablet 15 mg  15 mg Oral Daily Rhonda Mays MD   15 mg at 05/24/19 6549    Followed by   Dmitriy Mcintosh ON 5/27/2019] predniSONE (DELTASONE) tablet 10 mg  10 mg Oral Daily Rhonda Mays MD        Followed by   Dmitriy Mcintosh ON 5/30/2019] predniSONE (DELTASONE) tablet 5 mg  5 mg Oral Daily Rhonda Mays MD        lidocaine viscous hcl (XYLOCAINE) 2 % solution 15 mL  15 mL Mouth/Throat Q3H PRN Isai Mendoza MD        midazolam (VERSED) injection 4 mg  4 mg Intravenous Once Isai Mendoza MD        fentaNYL (SUBLIMAZE) injection 100 mcg  100 mcg Intravenous Once Isai Mendoza MD        sodium chloride bacteriostatic 0.9 % injection 30 mL  30 mL Injection Once Isai Mendoza MD        insulin glargine (LANTUS) injection vial 15 Units  15 Units Subcutaneous BID Shea Vazquez MD   15 Units at 05/24/19 0943    azithromycin (ZITHROMAX) tablet 500 mg  500 mg Oral Daily Clydell Sin Dorota Majano MD   500 mg at 05/24/19 0943    ciprofloxacin (CIPRO) tablet 500 mg  500 mg Oral 2 times per day Flora Finn MD   500 mg at 05/24/19 0942    albuterol (PROVENTIL) nebulizer solution 2.5 mg  2.5 mg Nebulization Q2H PRN Tawanda Mems Sedar, DO   2.5 mg at 05/19/19 0231    lisinopril (PRINIVIL;ZESTRIL) tablet 5 mg  5 mg Oral Daily Troy RAYMOND Holiday, DO   5 mg at 05/24/19 0942    glucose (GLUTOSE) 40 % oral gel 15 g  15 g Oral PRN Tawanda Mems Sedar, DO        dextrose 50 % solution 12.5 g  12.5 g Intravenous PRN Tawanda Mems Sedar, DO        glucagon (rDNA) injection 1 mg  1 mg Intramuscular PRN Tawanda Mems Sedar, DO        dextrose 5 % solution  100 mL/hr Intravenous PRN Tawanda Mems Sedar, DO        insulin lispro (HUMALOG) injection vial 0-12 Units  0-12 Units Subcutaneous TID SAYDA RAGLAND Sedar, DO   2 Units at 05/24/19 1230    insulin lispro (HUMALOG) injection vial 0-6 Units  0-6 Units Subcutaneous Nightly Tawanda Mems Sedar, DO   3 Units at 05/23/19 2049    guaiFENesin (MUCINEX) extended release tablet 600 mg  600 mg Oral BID Tawanda Mems Sedar, DO   600 mg at 05/24/19 2685    sodium chloride flush 0.9 % injection 10 mL  10 mL Intravenous 2 times per day Dread RAGLAND Sedar, DO   10 mL at 05/24/19 0948    sodium chloride flush 0.9 % injection 10 mL  10 mL Intravenous PRN Tawanda Mems Sedar, DO        magnesium hydroxide (MILK OF MAGNESIA) 400 MG/5ML suspension 30 mL  30 mL Oral Daily PRN Tawanda Mems Sedar, DO        ondansetron [facility]) injection 4 mg  4 mg Intravenous Q6H PRN Tawanda Mems Sedar, DO        enoxaparin (LOVENOX) injection 40 mg  40 mg Subcutaneous Daily Dread RAGLAND Sedar, DO   40 mg at 05/24/19 0943    labetalol (NORMODYNE;TRANDATE) injection syringe 10 mg  10 mg Intravenous Q10 Min PRN Tawanda Mems Sedar, DO        aspirin EC tablet 81 mg  81 mg Oral Daily Tawanda Mems Sedar, DO   81 mg at 05/24/19 3844    atorvastatin (LIPITOR) tablet 40 mg  40 mg Oral Nightly Getachew D Sedar, DO   40 mg at 05/23/19 2048     No Known Allergies  Active Problems: Cerebrovascular accident (CVA) due to occlusion of small artery    TIA (transient ischemic attack)    Mild intermittent asthma with exacerbation    Shortness of breath    Cerebrovascular accident (CVA) determined by clinical assessment likely ALEXANDER with Right kip ([address]    Urinary incontinence    Right leg weakness    Type 2 diabetes mellitus with hyperglycemia, with long-term current use of insulin (H[facility])    Carotid artery plaque, left    Cervical radicular pain    Cervical spinal stenosis    Cervical spondylosis with myelopathy    Orthostatic hypotension    Lumbar and sacral osteoarthritis    Type 2 diabetes mellitus without complication, with long-term current use of insulin ([facility])  Resolved Problems:    Cervical spondylosis without myelopathy    Blood pressure 138/74, pulse 104, temperature 97.9 °F (36.6 °C), temperature source Oral, resp. rate 18, height 5' 3\" (1.6 m), weight 195 lb (88.5 kg), SpO2 94 %. Subjective:  Symptoms:  No shortness of breath, malaise, cough, chest pain, weakness, headache, chest pressure, anorexia, diarrhea or anxiety. Diet:  No nausea or vomiting. Objective:  General Appearance:  Well-appearing, in no acute distress and comfortable. Vital signs: (most recent): Blood pressure 138/74, pulse 104, temperature 97.9 °F (36.6 °C), temperature source Oral, resp. rate 18, height 5' 3\" (1.6 m), weight 195 lb (88.5 kg), SpO2 94 %. HEENT: Normal HEENT exam.    Lungs:  (+ wheezing, decrease BS)  Heart: S1 normal and S2 normal.    Abdomen: Abdomen is soft. Bowel sounds are normal.   There is no epigastric area or suprapubic area tenderness. Pulses: Distal pulses are intact. Neurological: Patient is alert and oriented to person, place and time. Pupils:  Pupils are equal, round, and reactive to light. Skin:  Warm and dry.       Lab Results   Component Value Date    WBC 9.8 05/23/2019    HGB 12.9 05/23/2019    HCT 38.0 05/23/2019    MCV 89.2 05/23/2019     05/23/2019 Lab Results   Component Value Date     05/23/2019    K 3.9 05/23/2019     05/23/2019    CO2 25 05/23/2019    BUN 17 05/23/2019    CREATININE 0.53 05/23/2019    GLUCOSE 283 05/23/2019    CALCIUM 9.3 05/23/2019        Assessment & Plan  1)  copd exacerbation  Resolved    2) RLE weakness better, TI A ruled out vs MS exacerbation vs lumbar radiculopathy  LEONID negative for thrombus and vegetation     UTI, Klebselia  - urine culture positive for GNB, continue ciprp  NIDDM2 with hyperglycemia  Increase lantus     4) acute urinary re tension   resovled  5) urge incontinence  Start oxybutein  6) foraminal stenosis, lumbar   FU neurosurgery  Awaiting for precert    Juanita Choudhury MD  5/24/2019

## 2019-05-24 NOTE — PROGRESS NOTES
Nutrition Assessment (Low Risk)    Type and Reason for Visit: RD Nutrition Re-Screen    Nutrition Recommendations: Continue Cardiac, Carb Control 3 Choices Diet    Nutrition Assessment:  Patient assessed for nutritional risk. Deemed to be at low risk at this time. Will continue to monitor for changes in status. Pt adequately nourished pta as evidenced by reported good po intake and stable weight. Not at risk for nutrition decline due to continued good po intake (>75%). No nutrition diagnosis at this time. Will follow per protocol.     Malnutrition Assessment:  · Malnutrition Status: No malnutrition    Nutrition Risk Level   Risk Level: Low    Nutrition Diagnosis:   · Problem: No nutrition diagnosis at this time    Nutrition Intervention:  Food and/or Delivery: Continue current diet  Nutrition Education/Counseling/Coordination of Care:  Continued Inpatient Monitoring, Education declined      Electronically signed by Shey Greer RD, LD on 5/24/19 at 2:39 PM

## 2019-05-24 NOTE — PROGRESS NOTES
diaphoretic. Psychiatric: She has a normal mood and affect. Review of Systems   Constitutional: Negative for appetite change, chills, fatigue and fever. HENT: Negative for hearing loss and trouble swallowing. Eyes: Negative for visual disturbance. Respiratory: Negative for cough, chest tightness, shortness of breath and wheezing. Cardiovascular: Negative for chest pain, palpitations and leg swelling. Gastrointestinal: Negative for nausea and vomiting. Genitourinary: Positive for difficulty urinating and frequency. Negative for dysuria and hematuria. Musculoskeletal: Positive for gait problem. Skin: Negative for color change and rash. Neurological: Positive for weakness (RLE). Negative for dizziness, tremors, seizures, syncope, facial asymmetry, speech difficulty, light-headedness, numbness and headaches. Psychiatric/Behavioral: Negative for agitation, confusion and hallucinations. The patient is not nervous/anxious.         Medications:  Reviewed    Infusion Medications:    dextrose       Scheduled Medications:    vitamin D  1,000 Units Oral Lunch    vitamin D  50,000 Units Oral Weekly    ipratropium-albuterol  1 ampule Inhalation TID    oxybutynin  5 mg Oral TID    insulin lispro  10 Units Subcutaneous TID     budesonide  0.5 mg Nebulization BID    predniSONE  15 mg Oral Daily    Followed by   Dennis Hagan ON 5/27/2019] predniSONE  10 mg Oral Daily    Followed by   Dennis Hagan ON 5/30/2019] predniSONE  5 mg Oral Daily    midazolam  4 mg Intravenous Once    fentanNYL  100 mcg Intravenous Once    sodium chloride bacteriostatic  30 mL Injection Once    insulin glargine  15 Units Subcutaneous BID    azithromycin  500 mg Oral Daily    ciprofloxacin  500 mg Oral 2 times per day    lisinopril  5 mg Oral Daily    insulin lispro  0-12 Units Subcutaneous TID     insulin lispro  0-6 Units Subcutaneous Nightly    guaiFENesin  600 mg Oral BID    sodium chloride flush  10 mL Intravenous 2 times per day    enoxaparin  40 mg Subcutaneous Daily    aspirin  81 mg Oral Daily    atorvastatin  40 mg Oral Nightly     PRN Meds: acetaminophen, lidocaine viscous hcl, albuterol, glucose, dextrose, glucagon (rDNA), dextrose, sodium chloride flush, magnesium hydroxide, ondansetron, labetalol    Labs:   Recent Labs     05/22/19  0525 05/23/19  0549   WBC 10.1 9.8   HGB 13.7 12.9   HCT 40.0 38.0    260     Recent Labs     05/22/19  0525 05/23/19  0548    138   K 4.7 3.9    104   CO2 24 25   BUN 15 17   CREATININE 0.55 0.53   CALCIUM 9.2 9.3     No results for input(s): AST, ALT, BILIDIR, BILITOT, ALKPHOS in the last 72 hours. No results for input(s): INR in the last 72 hours. No results for input(s): [place] in the last 72 hours. Urinalysis:   Lab Results   Component Value Date    NITRU POSITIVE 05/17/2019    WBCUA  05/17/2019    BACTERIA MODERATE 05/17/2019    RBCUA 3-5 05/17/2019    BLOODU Negative 05/17/2019    SPECGRAV 1.038 05/17/2019    GLUCOSEU 250 05/17/2019       Radiology:   Most recent    Chest CT      WITH CONTRAST:No results found for this or any previous visit. WITHOUT CONTRAST: No results found for this or any previous visit. CXR      2-view:   Results for orders placed during the hospital encounter of 05/17/19   XR CHEST STANDARD (2 VW)    Narrative EXAMINATION: XR CHEST (2 VW)    CLINICAL HISTORY: COUGH    COMPARISONS: May 17, 2019. FINDINGS:    Osseous structures intact. Cardiopericardial silhouette normal. Pulmonary vasculature normal. Lungs clear. Impression No acute cardiopulmonary disease. Portable:   Results for orders placed during the hospital encounter of 05/17/19   XR CHEST PORTABLE    Narrative EXAMINATION: XR CHEST PORTABLE    CLINICAL HISTORY:   LOSS OF USE OF RIGHT LEG     COMPARISONS: NOVEMBER 24, 2013    FINDINGS: Osseous structures intact.  Cardiopericardial silhouette normal. Pulmonary vasculature normal. Lungs clear. Impression NO ACUTE CARDIOPULMONARY DISEASE. Echo No results found for this or any previous visit. Assessment/Plan:  RLE weakness  MRI brain no acute process, T2 hyperintense focus periventricular similar to 1/22/11  Carotid US <50% internal bilat, bilat plaque burden at carotid bifurcation,  TTE showed IAS aneurysm, tech difficulty bubble study  LEONID  negative  ASA 81mg daily  Pt with continued RLE weakness and new urinary retention. MRI lumbar spine shows right foraminal stenosis and compression on exiting L4 nerve root   NSGY consult  Suspicious ALEXANDER CVA not seen on MRI given clinical findings of complete weakness of RLE  In addition pt has L4 radiculopathy which can be addressed as outpatient  Pt with MRI cervical spine 2017 at [facility] that showed severe canal stenosis and significant spondylosis with severe narrowing. MRI also showed multifocal areas of T2 abnormalities concerning for demyelinating disease suspicious for MS. Will repeat MRI cervical spine and thoracic spine     MRI seen and reviewed, low suggestion of demyelination disease, Cervical stenosis, moderate, not surgical  Could still be primary relapsing or secondary relapsing MS. Findings only based on comparison MRI done at [facility] and improvement of MRI. No recommended treatment for now. Tapering steroids. Pt unable to go to [address] [place] [address] rehab due to insurance. Plan for [facility]  OK to DC  Follow up 4-6 weeks  Issue with precert   Leonel Trotter MD, Tam Xiao, American Board of Psychiatry & Neurology  Board Certified in Vascular Neurology  Board Certified in Neuromuscular Medicine  Certified in Neurorehabilitation         Collaborating physicians: Dr Suzanna Trotter, Dr REYNALDO Peterson, Dr Reinier Kang    Electronically signed by MAJOR Betts - CNP on 5/24/2019 at 9:04 AM

## 2019-05-24 NOTE — FLOWSHEET NOTE
Pt assessment complete, pt alert and oriented x 4, denies pain, denies n/v, denies sob & cp, call light in reach, bed in lowest position, bed alarm engaged, will continue to monitor.

## 2019-05-25 LAB
BLOOD CULTURE, ROUTINE: NORMAL
CULTURE, BLOOD 2: NORMAL
GLUCOSE BLD-MCNC: 100 MG/DL (ref 60–115)
GLUCOSE BLD-MCNC: 158 MG/DL (ref 60–115)
GLUCOSE BLD-MCNC: 186 MG/DL (ref 60–115)
GLUCOSE BLD-MCNC: 189 MG/DL (ref 60–115)
PERFORMED ON: ABNORMAL
PERFORMED ON: NORMAL

## 2019-05-25 PROCEDURE — 6370000000 HC RX 637 (ALT 250 FOR IP): Performed by: INTERNAL MEDICINE

## 2019-05-25 PROCEDURE — 6360000002 HC RX W HCPCS: Performed by: INTERNAL MEDICINE

## 2019-05-25 PROCEDURE — 94640 AIRWAY INHALATION TREATMENT: CPT

## 2019-05-25 PROCEDURE — 6370000000 HC RX 637 (ALT 250 FOR IP): Performed by: PHYSICAL MEDICINE & REHABILITATION

## 2019-05-25 PROCEDURE — 94760 N-INVAS EAR/PLS OXIMETRY 1: CPT

## 2019-05-25 PROCEDURE — 97116 GAIT TRAINING THERAPY: CPT

## 2019-05-25 PROCEDURE — 1210000000 HC MED SURG R&B

## 2019-05-25 PROCEDURE — 94664 DEMO&/EVAL PT USE INHALER: CPT

## 2019-05-25 RX ORDER — IPRATROPIUM BROMIDE AND ALBUTEROL SULFATE 2.5; .5 MG/3ML; MG/3ML
1 SOLUTION RESPIRATORY (INHALATION) EVERY 4 HOURS PRN
Status: DISCONTINUED | OUTPATIENT
Start: 2019-05-25 | End: 2019-05-28 | Stop reason: HOSPADM

## 2019-05-25 RX ORDER — OXYBUTYNIN CHLORIDE 5 MG/1
5 TABLET ORAL 4 TIMES DAILY
Status: DISCONTINUED | OUTPATIENT
Start: 2019-05-25 | End: 2019-05-28 | Stop reason: HOSPADM

## 2019-05-25 RX ADMIN — PREDNISONE 15 MG: 5 TABLET ORAL at 08:56

## 2019-05-25 RX ADMIN — INSULIN LISPRO 2 UNITS: 100 INJECTION, SOLUTION INTRAVENOUS; SUBCUTANEOUS at 13:18

## 2019-05-25 RX ADMIN — LISINOPRIL 5 MG: 5 TABLET ORAL at 08:56

## 2019-05-25 RX ADMIN — INSULIN GLARGINE 15 UNITS: 100 INJECTION, SOLUTION SUBCUTANEOUS at 08:59

## 2019-05-25 RX ADMIN — IPRATROPIUM BROMIDE AND ALBUTEROL SULFATE 1 AMPULE: 2.5; .5 SOLUTION RESPIRATORY (INHALATION) at 07:16

## 2019-05-25 RX ADMIN — CIPROFLOXACIN HYDROCHLORIDE 500 MG: 500 TABLET, FILM COATED ORAL at 08:56

## 2019-05-25 RX ADMIN — BUDESONIDE 500 MCG: 0.5 INHALANT RESPIRATORY (INHALATION) at 19:47

## 2019-05-25 RX ADMIN — BUDESONIDE 500 MCG: 0.5 INHALANT RESPIRATORY (INHALATION) at 07:16

## 2019-05-25 RX ADMIN — ASPIRIN 81 MG: 81 TABLET, COATED ORAL at 08:56

## 2019-05-25 RX ADMIN — INSULIN GLARGINE 15 UNITS: 100 INJECTION, SOLUTION SUBCUTANEOUS at 21:08

## 2019-05-25 RX ADMIN — ENOXAPARIN SODIUM 40 MG: 40 INJECTION SUBCUTANEOUS at 08:56

## 2019-05-25 RX ADMIN — IPRATROPIUM BROMIDE AND ALBUTEROL SULFATE 1 AMPULE: 2.5; .5 SOLUTION RESPIRATORY (INHALATION) at 14:00

## 2019-05-25 RX ADMIN — GUAIFENESIN 600 MG: 600 TABLET, EXTENDED RELEASE ORAL at 21:08

## 2019-05-25 RX ADMIN — OXYBUTYNIN CHLORIDE 5 MG: 5 TABLET ORAL at 08:56

## 2019-05-25 RX ADMIN — GUAIFENESIN 600 MG: 600 TABLET, EXTENDED RELEASE ORAL at 08:56

## 2019-05-25 RX ADMIN — IPRATROPIUM BROMIDE AND ALBUTEROL SULFATE 1 AMPULE: 2.5; .5 SOLUTION RESPIRATORY (INHALATION) at 19:49

## 2019-05-25 RX ADMIN — ATORVASTATIN CALCIUM 40 MG: 40 TABLET, FILM COATED ORAL at 21:08

## 2019-05-25 RX ADMIN — INSULIN LISPRO 2 UNITS: 100 INJECTION, SOLUTION INTRAVENOUS; SUBCUTANEOUS at 18:09

## 2019-05-25 RX ADMIN — VITAMIN D, TAB 1000IU (100/BT) 1000 UNITS: 25 TAB at 14:34

## 2019-05-25 RX ADMIN — AZITHROMYCIN MONOHYDRATE 500 MG: 250 TABLET ORAL at 08:56

## 2019-05-25 RX ADMIN — OXYBUTYNIN CHLORIDE 5 MG: 5 TABLET ORAL at 21:08

## 2019-05-25 ASSESSMENT — PAIN SCALES - GENERAL: PAINLEVEL_OUTOF10: 0

## 2019-05-25 NOTE — PROGRESS NOTES
Physical Therapy Med Surg Daily Treatment Note  Facility/Department: [address] NEURO  Room: 25/[place] [address]       NAME: Tana Marino  : 1950 (79 y.o.)  MRN: [id number]  CODE STATUS: Full Code    Date of Service: 2019    Patient Diagnosis(es): TIA (transient ischemic attack) [G45.9]  TIA (transient ischemic attack) [G45.9]   Chief Complaint   Patient presents with    Extremity Weakness     right leg weakness, woke up from a nap at 1730 today and right leg is weak     Patient Active Problem List    Diagnosis Date Noted    Cerebrovascular accident (CVA) due to occlusion of small artery      Priority: High    Cerebrovascular accident (CVA) determined by clinical assessment likely ALEXANDER with Right kip ([address] 2019    Urinary incontinence 2019    Right leg weakness 2019    Type 2 diabetes mellitus with hyperglycemia, with long-term current use of insulin ([address] 2019    Mild intermittent asthma with exacerbation     Shortness of breath     TIA (transient ischemic attack) 2019    Right foot drop 02/15/2019    Mixed hyperlipidemia 2018    Chronic obstructive pulmonary disease ([address] 2018    Carotid artery plaque, left 2017    Cervical spinal stenosis 2017    Orthostatic hypotension 2017    Mild intermittent asthma without complication     Temporary cerebral vascular dysfunction 2017    Type 2 diabetes mellitus with vascular disease ([address] 2017    Type 2 diabetes mellitus without complication, with long-term current use of insulin ([address] 2017    Cervical radicular pain 2017    Essential hypertension 2017    Lumbosacral spondylosis without myelopathy 2015    Lumbar and sacral osteoarthritis 2015    Cervical spondylosis with myelopathy 2007        Past Medical History:   Diagnosis Date    Asthma     Bladder infection     Chicken pox     Chronic back pain     Hypertension     Measles  Mumps     Osteoarthritis     Pneumonia     Whooping cough      Past Surgical History:   Procedure Laterality Date    APPENDECTOMY  1978    HYSTERECTOMY  1983    TONSILLECTOMY  1966         Restrictions  Restrictions/Precautions: Fall Risk    Subjective   General  Chart Reviewed: Yes  Family / Caregiver Present: No  Pre Treatment Pain Screening  Pain at present: 0  Scale Used: Numeric Score  Intervention List: Patient able to continue with treatment    Pain Screening  Patient Currently in Pain: Denies     Pain Reassessment:   Pain Assessment  Pain Assessment: 0-10  Pain Level: 0       Orientation  Orientation  Overall Orientation Status: Within Functional Limits    Objective   Bed mobility  Supine to Sit: Stand by assistance  Comment: bed flat with bedrail, increased time and effort    Transfers  Sit to Stand: Stand by assistance  Stand to sit: Stand by assistance  Comment: at [facility] [place], [facility] by [facility]    Ambulation  Ambulation?: Yes  Ambulation 1  Surface: level tile  Device: Rolling Walker  Assistance: Contact guard assistance  Quality of Gait: reciprocal with decreased step length and heel strike, R toe out  Distance: 50'  Comments: increased cues with change of direction  Stairs/Curb  Stairs?: No       Balance  Standing - Static: Fair  Standing - Dynamic: Fair  Comments: at [facility], [place], operated by [facility]                        Assessment   Activity Tolerance  Activity Tolerance: Patient Tolerated treatment well     Discharge Recommendations:  Continue to assess pending progress    Goals  Long term goals  Long term goal 1: indep with HEP to improve R LE strength  Long term goal 2: bed mobility with indep   Long term goal 3: functional transfers with mod I  Long term goal 4: amb 50ft with LRAD and mod I  Patient Goals   Patient goals : \"walk better, go home\"    Plan    Plan  Times per week: 3-6  Current Treatment Recommendations: Strengthening, ROM, Functional Mobility Training, Neuromuscular Re-education, Manual Therapy - Joint Manipulation, Home Exercise Program, Equipment Evaluation, Education, & procurement, Modalities, Manual Therapy - Soft Tissue Mobilization, Gait Training, Transfer Training, Balance Training, Endurance Training, Stair training, Pain Management, Patient/Caregiver Education & Training, Safety Education & Training, Positioning  Plan Comment: cont current POC  Safety Devices  Type of devices: Call light within reach, Chair alarm in place, Left in chair, Nurse notified     [facility] ([phone] CLICK) [phone] Mobility Raw Score : 18    Therapy Time   Individual   Time In 0936   Time Out 0946   Minutes ROXANNA Gil, 05/25/19 at 9:47 AM

## 2019-05-25 NOTE — PROGRESS NOTES
Neurology Daily Progress Note  Name: Darylene Shadow  Age: 79 y.o. Gender: female  CodeStatus: Full Code  Allergies: No Known Allergies    Chief Complaint:Extremity Weakness (right leg weakness, woke up from a nap at 1730 today and right leg is weak)improved though fluctuates    Primary Care Provider: Willy Padgett MD  InpatientTreatment Team: Treatment Team: Attending Provider: Dileep Polanco MD; Consulting Physician: Esthela Cortes MD; Consulting Physician: Can Cannon MD; Consulting Physician: Samara Thomas MD; Consulting Physician: Jennifer Bravo DO; Registered Nurse: Ermelinda Bright, RN; : Edith Ge, RN; Registered Nurse: Rasta Starr RN  Admission Date: 5/17/2019      HPI Pt seen and examined for neuro follow up for RLE weakness urinary retention and bladder incontinence. A&O x3, NAD, cooperative. No new focal neuro deficits. Still with RLE weakness 4/5. Pt with urinary frequency. Had some urinary retention after admission. Intermittent urinary incontinence as well. Denies Headache, double vision, blurry vision, difficulty with speech, difficulty with swallowing, numbness, pain, nausea, vomiting, choking, neck pain, dizziness    Vitals:    05/24/19 0719   BP:    Pulse: 96   Resp: 18   Temp:    SpO2: 95%        Physical Exam   Constitutional: She is oriented to person, place, and time. She appears well-nourished. No distress. HENT:   Head: Normocephalic and atraumatic. Eyes: Pupils are equal, round, and reactive to light. EOM are normal.   Neck: Neck supple. No JVD present. Cardiovascular: Normal rate and regular rhythm. Pulmonary/Chest: Effort normal and breath sounds normal. No respiratory distress. Abdominal: Soft. Bowel sounds are normal. She exhibits no distension. There is no tenderness. Musculoskeletal: She exhibits no edema. Neurological: She is alert and oriented to person, place, and time. No cranial nerve deficit.    RLE weakness 4/5   Skin: Skin is warm and dry. She is not diaphoretic. Psychiatric: She has a normal mood and affect. Review of Systems   Constitutional: Negative for appetite change, chills, fatigue and fever. HENT: Negative for hearing loss and trouble swallowing. Eyes: Negative for visual disturbance. Respiratory: Negative for cough, chest tightness, shortness of breath and wheezing. Cardiovascular: Negative for chest pain, palpitations and leg swelling. Gastrointestinal: Negative for nausea and vomiting. Genitourinary: Positive for difficulty urinating and frequency. Negative for dysuria and hematuria. Musculoskeletal: Positive for gait problem. Skin: Negative for color change and rash. Neurological: Positive for weakness (RLE). Negative for dizziness, tremors, seizures, syncope, facial asymmetry, speech difficulty, light-headedness, numbness and headaches. Psychiatric/Behavioral: Negative for agitation, confusion and hallucinations. The patient is not nervous/anxious.         Medications:  Reviewed    Infusion Medications:    dextrose       Scheduled Medications:    vitamin D  1,000 Units Oral Lunch    vitamin D  50,000 Units Oral Weekly    ipratropium-albuterol  1 ampule Inhalation TID    oxybutynin  5 mg Oral TID    insulin lispro  10 Units Subcutaneous TID     budesonide  0.5 mg Nebulization BID    predniSONE  15 mg Oral Daily    Followed by   Mario Gray ON 5/27/2019] predniSONE  10 mg Oral Daily    Followed by   Mario Gray ON 5/30/2019] predniSONE  5 mg Oral Daily    midazolam  4 mg Intravenous Once    fentanNYL  100 mcg Intravenous Once    sodium chloride bacteriostatic  30 mL Injection Once    insulin glargine  15 Units Subcutaneous BID    azithromycin  500 mg Oral Daily    ciprofloxacin  500 mg Oral 2 times per day    lisinopril  5 mg Oral Daily    insulin lispro  0-12 Units Subcutaneous TID     insulin lispro  0-6 Units Subcutaneous Nightly    guaiFENesin  600 mg Oral BID    sodium chloride flush  10 mL Intravenous 2 times per day    enoxaparin  40 mg Subcutaneous Daily    aspirin  81 mg Oral Daily    atorvastatin  40 mg Oral Nightly     PRN Meds: acetaminophen, lidocaine viscous hcl, albuterol, glucose, dextrose, glucagon (rDNA), dextrose, sodium chloride flush, magnesium hydroxide, ondansetron, labetalol    Labs:   Recent Labs     05/22/19  0525 05/23/19  0549   WBC 10.1 9.8   HGB 13.7 12.9   HCT 40.0 38.0    260     Recent Labs     05/22/19  0525 05/23/19  0548    138   K 4.7 3.9    104   CO2 24 25   BUN 15 17   CREATININE 0.55 0.53   CALCIUM 9.2 9.3     No results for input(s): AST, ALT, BILIDIR, BILITOT, ALKPHOS in the last 72 hours. No results for input(s): INR in the last 72 hours. No results for input(s): Clarance Palacios in the last 72 hours. Urinalysis:   Lab Results   Component Value Date    NITRU POSITIVE 05/17/2019    WBCUA  05/17/2019    BACTERIA MODERATE 05/17/2019    RBCUA 3-5 05/17/2019    BLOODU Negative 05/17/2019    SPECGRAV 1.038 05/17/2019    GLUCOSEU 250 05/17/2019       Radiology:   Most recent    Chest CT      WITH CONTRAST:No results found for this or any previous visit. WITHOUT CONTRAST: No results found for this or any previous visit. CXR      2-view:   Results for orders placed during the hospital encounter of 05/17/19   XR CHEST STANDARD (2 VW)    Narrative EXAMINATION: XR CHEST (2 VW)    CLINICAL HISTORY: COUGH    COMPARISONS: May 17, 2019. FINDINGS:    Osseous structures intact. Cardiopericardial silhouette normal. Pulmonary vasculature normal. Lungs clear. Impression No acute cardiopulmonary disease. Portable:   Results for orders placed during the hospital encounter of 05/17/19   XR CHEST PORTABLE    Narrative EXAMINATION: XR CHEST PORTABLE    CLINICAL HISTORY:   LOSS OF USE OF RIGHT LEG     COMPARISONS: NOVEMBER 24, 2013    FINDINGS: Osseous structures intact.  Cardiopericardial silhouette normal. Pulmonary vasculature normal. Lungs clear. Impression NO ACUTE CARDIOPULMONARY DISEASE. Echo No results found for this or any previous visit. Assessment/Plan:  RLE weakness  MRI brain no acute process, T2 hyperintense focus periventricular similar to 1/22/11  Carotid US <50% internal bilat, bilat plaque burden at carotid bifurcation,  TTE showed IAS aneurysm, tech difficulty bubble study  LEONID  negative  ASA 81mg daily  Pt with continued RLE weakness and new urinary retention. MRI lumbar spine shows right foraminal stenosis and compression on exiting L4 nerve root   NSGY consult  Suspicious ALEXANDER CVA not seen on MRI given clinical findings of complete weakness of RLE  In addition pt has L4 radiculopathy which can be addressed as outpatient  Pt with MRI cervical spine 2017 at [facility] that showed severe canal stenosis and significant spondylosis with severe narrowing. MRI also showed multifocal areas of T2 abnormalities concerning for demyelinating disease suspicious for MS. Will repeat MRI cervical spine and thoracic spine     MRI seen and reviewed, low suggestion of demyelination disease, Cervical stenosis, moderate, not surgical  Could still be primary relapsing or secondary relapsing MS. Findings only based on comparison MRI done at [facility] and improvement of MRI. No recommended treatment for now. Tapering steroids. Pt independatly intermittently walking  Suspect more cognitive issues then physical  If remains independent [facility] d/c home with [place] [id number]  Pt unable to go to [facility] rehab due to insurance. Plan for [facility]  OK to DC  Follow up 4-6 weeks  Issue with precert     Leonel Valles MD, Derrick Richmond, American Board of Psychiatry & Neurology  Board Certified in Vascular Neurology  Board Certified in Neuromuscular Medicine  Certified in [address]

## 2019-05-26 LAB
GLUCOSE BLD-MCNC: 115 MG/DL (ref 60–115)
GLUCOSE BLD-MCNC: 167 MG/DL (ref 60–115)
GLUCOSE BLD-MCNC: 180 MG/DL (ref 60–115)
GLUCOSE BLD-MCNC: 274 MG/DL (ref 60–115)
PERFORMED ON: ABNORMAL
PERFORMED ON: NORMAL

## 2019-05-26 PROCEDURE — 6370000000 HC RX 637 (ALT 250 FOR IP): Performed by: INTERNAL MEDICINE

## 2019-05-26 PROCEDURE — 6360000002 HC RX W HCPCS: Performed by: INTERNAL MEDICINE

## 2019-05-26 PROCEDURE — 1210000000 HC MED SURG R&B

## 2019-05-26 PROCEDURE — 94640 AIRWAY INHALATION TREATMENT: CPT

## 2019-05-26 PROCEDURE — 6370000000 HC RX 637 (ALT 250 FOR IP): Performed by: PHYSICAL MEDICINE & REHABILITATION

## 2019-05-26 RX ADMIN — BUDESONIDE 500 MCG: 0.5 INHALANT RESPIRATORY (INHALATION) at 07:16

## 2019-05-26 RX ADMIN — INSULIN GLARGINE 15 UNITS: 100 INJECTION, SOLUTION SUBCUTANEOUS at 09:34

## 2019-05-26 RX ADMIN — OXYBUTYNIN CHLORIDE 5 MG: 5 TABLET ORAL at 20:49

## 2019-05-26 RX ADMIN — INSULIN LISPRO 2 UNITS: 100 INJECTION, SOLUTION INTRAVENOUS; SUBCUTANEOUS at 12:55

## 2019-05-26 RX ADMIN — GUAIFENESIN 600 MG: 600 TABLET, EXTENDED RELEASE ORAL at 20:49

## 2019-05-26 RX ADMIN — LISINOPRIL 5 MG: 5 TABLET ORAL at 09:34

## 2019-05-26 RX ADMIN — IPRATROPIUM BROMIDE AND ALBUTEROL SULFATE 1 AMPULE: .5; 3 SOLUTION RESPIRATORY (INHALATION) at 07:16

## 2019-05-26 RX ADMIN — OXYBUTYNIN CHLORIDE 5 MG: 5 TABLET ORAL at 17:23

## 2019-05-26 RX ADMIN — OXYBUTYNIN CHLORIDE 5 MG: 5 TABLET ORAL at 12:52

## 2019-05-26 RX ADMIN — ASPIRIN 81 MG: 81 TABLET, COATED ORAL at 09:34

## 2019-05-26 RX ADMIN — AZITHROMYCIN MONOHYDRATE 500 MG: 250 TABLET ORAL at 09:34

## 2019-05-26 RX ADMIN — INSULIN LISPRO 2 UNITS: 100 INJECTION, SOLUTION INTRAVENOUS; SUBCUTANEOUS at 16:56

## 2019-05-26 RX ADMIN — ATORVASTATIN CALCIUM 40 MG: 40 TABLET, FILM COATED ORAL at 20:50

## 2019-05-26 RX ADMIN — VITAMIN D, TAB 1000IU (100/BT) 1000 UNITS: 25 TAB at 12:52

## 2019-05-26 RX ADMIN — OXYBUTYNIN CHLORIDE 5 MG: 5 TABLET ORAL at 09:34

## 2019-05-26 RX ADMIN — INSULIN GLARGINE 15 UNITS: 100 INJECTION, SOLUTION SUBCUTANEOUS at 20:50

## 2019-05-26 RX ADMIN — BUDESONIDE 500 MCG: 0.5 INHALANT RESPIRATORY (INHALATION) at 20:44

## 2019-05-26 RX ADMIN — GUAIFENESIN 600 MG: 600 TABLET, EXTENDED RELEASE ORAL at 09:34

## 2019-05-26 RX ADMIN — PREDNISONE 15 MG: 5 TABLET ORAL at 09:33

## 2019-05-26 RX ADMIN — ENOXAPARIN SODIUM 40 MG: 40 INJECTION SUBCUTANEOUS at 09:33

## 2019-05-26 ASSESSMENT — ENCOUNTER SYMPTOMS
VOMITING: 0
COUGH: 0
SHORTNESS OF BREATH: 0
NAUSEA: 0
DIARRHEA: 0

## 2019-05-26 NOTE — CARE COORDINATION
SPOKE WITH PATIENT. SHE WANTS TO WAIT TILL Tuesday TO WAIT FOR HER PRECERT FOR [facility]. SHE STATED THAT IF BY Tuesday SHE FEELS SAFE TO GO HOME AND STRONGER BY THEN, THEN SHE WILL BE AGREEABLE TO GO HOME WITH [facility] [place], FREEDOM OF CHOICE WAS PROVIDED. WE WOULD NEED TO OBTAIN AN ORDER IF SHE DOES GO HOME WITH [facility]. WENT OVER PAGE 2 OF HER IMM FOR THE SECOND NOTIFICATION, PATIENT VERBALLY UNDERSTOOD AND INTIALS OBTAINED. LSW/C3 TO FOLLOW.

## 2019-05-26 NOTE — PROGRESS NOTES
Xiomy Lundberg is a 79 y.o. female patient.  Pt was seen and evaluated,no overnight events, no new complaints    Current Facility-Administered Medications   Medication Dose Route Frequency Provider Last Rate Last Dose    oxybutynin (DITROPAN) tablet 5 mg  5 mg Oral 4x Daily Avis Arnold MD   5 mg at 05/26/19 1252    ipratropium-albuterol (DUONEB) nebulizer solution 1 ampule  1 ampule Inhalation Q4H PRN Lydia Thompson MD   1 ampule at 05/26/19 0716    acetaminophen (TYLENOL) tablet 325 mg  325 mg Oral Q4H PRN Kirti Scullin, DO        vitamin D (CHOLECALCIFEROL) tablet 1,000 Units  1,000 Units Oral Lunch Kirti Scullin, DO   1,000 Units at 05/26/19 1252    vitamin D (ERGOCALCIFEROL) capsule 50,000 Units  50,000 Units Oral Weekly Kirti Scullin, DO   50,000 Units at 05/23/19 2113    insulin lispro (HUMALOG) injection vial 10 Units  10 Units Subcutaneous TID WC Avis Arnold MD   10 Units at 05/26/19 1257    budesonide (PULMICORT) nebulizer suspension 500 mcg  0.5 mg Nebulization BID Avis Arnold MD   500 mcg at 05/26/19 0716    [START ON 5/27/2019] predniSONE (DELTASONE) tablet 10 mg  10 mg Oral Daily Saumya Dejesus MD        Followed by   Marquis Roman ON 5/30/2019] predniSONE (DELTASONE) tablet 5 mg  5 mg Oral Daily Saumya Dejesus MD        lidocaine viscous hcl (XYLOCAINE) 2 % solution 15 mL  15 mL Mouth/Throat Q3H PRN Brigid Kraft MD        midazolam (VERSED) injection 4 mg  4 mg Intravenous Once Brigid Kratf MD        fentaNYL (SUBLIMAZE) injection 100 mcg  100 mcg Intravenous Once Brigid Kraft MD        sodium chloride bacteriostatic 0.9 % injection 30 mL  30 mL Injection Once Brigid Kraft MD        insulin glargine (LANTUS) injection vial 15 Units  15 Units Subcutaneous BID Avis Arnold MD   15 Units at 05/26/19 0934    azithromycin (ZITHROMAX) tablet 500 mg  500 mg Oral Daily Avis Arnold MD   500 mg at 05/26/19 0934    lisinopril (PRINIVIL;ZESTRIL) tablet 5 mg  5 mg Oral Daily Troy RAYMOND Holiday, DO   5 mg at 05/26/19 0934    glucose (GLUTOSE) 40 % oral gel 15 g  15 g Oral PRN Tawanda Mems Sedar, DO        dextrose 50 % solution 12.5 g  12.5 g Intravenous PRN Tawanda Mems Sedar, DO        glucagon (rDNA) injection 1 mg  1 mg Intramuscular PRN Tawanda Mems Sedar, DO        dextrose 5 % solution  100 mL/hr Intravenous PRN Tawanda Mems Sedar, DO        insulin lispro (HUMALOG) injection vial 0-12 Units  0-12 Units Subcutaneous TID SAYDA Chilel D Sedar, DO   2 Units at 05/26/19 1255    insulin lispro (HUMALOG) injection vial 0-6 Units  0-6 Units Subcutaneous Nightly Tawanda Mems Sedar, DO   1 Units at 05/25/19 2109    guaiFENesin [facility]) extended release tablet 600 mg  600 mg Oral BID Tawanda Mems Sedar, DO   600 mg at 05/26/19 0926    sodium chloride flush 0.9 % injection 10 mL  10 mL Intravenous 2 times per day Dread RAGLAND Sedar, DO   10 mL at 05/24/19 5791    sodium chloride flush 0.9 % injection 10 mL  10 mL Intravenous PRN Tawanda Mems Sedar, DO        magnesium hydroxide (MILK OF MAGNESIA) 400 MG/5ML suspension 30 mL  30 mL Oral Daily PRN Tawanda Mems Sedar, DO        ondansetron [facility]) injection 4 mg  4 mg Intravenous Q6H PRN Tawanda Mems Sedar, DO        enoxaparin (LOVENOX) injection 40 mg  40 mg Subcutaneous Daily Dread RAGLAND Sedar, DO   40 mg at 05/26/19 1026    labetalol (NORMODYNE;TRANDATE) injection syringe 10 mg  10 mg Intravenous Q10 Min PRN Tawanda Mems Sedar, DO        aspirin EC tablet 81 mg  81 mg Oral Daily Tawanda Mems Sedar, DO   81 mg at 05/26/19 3967    atorvastatin (LIPITOR) tablet 40 mg  40 mg Oral Nightly Tawanda Mems Sedar, DO   40 mg at 05/25/19 2108     No Known Allergies  Active Problems:    Cerebrovascular accident (CVA) due to occlusion of small artery    TIA (transient ischemic attack)    Mild intermittent asthma with exacerbation    Shortness of breath    Cerebrovascular accident (CVA) determined by clinical assessment likely ALEXANDER with Right kip (HCC)    Urinary incontinence    Right leg weakness    Type 2 diabetes mellitus with hyperglycemia, with long-term current use of insulin (HCC)    Carotid artery plaque, left    Cervical radicular pain    Cervical spinal stenosis    Cervical spondylosis with myelopathy    Orthostatic hypotension    Lumbar and sacral osteoarthritis    Type 2 diabetes mellitus without complication, with long-term current use of insulin (HCC)  Resolved Problems:    Cervical spondylosis without myelopathy    Blood pressure 135/74, pulse 88, temperature 97.5 °F (36.4 °C), temperature source Oral, resp. rate 16, height 5' 3\" (1.6 m), weight 195 lb (88.5 kg), SpO2 94 %. Subjective:  Symptoms:  No shortness of breath, malaise, cough, chest pain, weakness, headache, chest pressure, anorexia, diarrhea or anxiety. Diet:  No nausea or vomiting. Objective:  General Appearance:  Well-appearing, in no acute distress and comfortable. Vital signs: (most recent): Blood pressure 135/74, pulse 88, temperature 97.5 °F (36.4 °C), temperature source Oral, resp. rate 16, height 5' 3\" (1.6 m), weight 195 lb (88.5 kg), SpO2 94 %. HEENT: Normal HEENT exam.    Lungs:  (+ wheezing, decrease BS)  Heart: S1 normal and S2 normal.    Abdomen: Abdomen is soft. Bowel sounds are normal.   There is no epigastric area or suprapubic area tenderness. Pulses: Distal pulses are intact. Neurological: Patient is alert and oriented to person, place and time. Pupils:  Pupils are equal, round, and reactive to light. Skin:  Warm and dry.       Lab Results   Component Value Date    WBC 9.8 05/23/2019    HGB 12.9 05/23/2019    HCT 38.0 05/23/2019    MCV 89.2 05/23/2019     05/23/2019     Lab Results   Component Value Date     05/23/2019    K 3.9 05/23/2019     05/23/2019    CO2 25 05/23/2019    BUN 17 05/23/2019    CREATININE 0.53 05/23/2019    GLUCOSE 283 05/23/2019    CALCIUM 9.3 05/23/2019        Assessment & Plan  1)  copd exacerbation  Resolved    2) RLE weakness better, TI A ruled out vs MS exacerbation vs lumbar radiculopathy  LEONID negative for thrombus and vegetation     UTI, Klebselia  - urine culture positive for GNB, continue ciprp  NIDDM2 with hyperglycemia  Increase lantus     4) acute urinary re tension   resovled  5) urge incontinence  Start oxybutein  6) foraminal stenosis, lumbar   FU neurosurgery  Awaiting for precert  Level 1    Sondra Echols MD  5/26/2019

## 2019-05-26 NOTE — PROGRESS NOTES
Pulmonary Disorder  (acute or chronic)  [x]   Severe or Chronic w/ Exacerbation  []     Surgical Status No [x]   Surgeries     General []   Surgery Lower []   Abdominal Thoracic or []   Upper Abdominal Thoracic with  PulmonaryDisorder  []     Chest X-ray Clear/Not  Ordered     [x]  Chronic Changes  Results Pending  []  Infiltrates, atelectasis, pleural effusion, or edema  []  Infiltrates in more than one lobe []  Infiltrate + Atelectasis, &/or pleural effusion  []    Respiratory Pattern Regular,  RR = 12-20 [x]  Increased,  RR = 21-25 []  ALBERTS, irregular,  or RR = 26-30 []  Decreased FEV1  or RR = 31-35 []  Severe SOB, use  of accessory muscles, or RR ? 35  []    Mental Status Alert, oriented,  Cooperative [x]  Confused but Follows commands []  Lethargic or unable to follow commands []  Obtunded  []  Comatose  []    Breath Sounds Clear to  auscultation  []  Decreased unilaterally or  in bases only [x]  Decreased  bilaterally  []  Crackles or intermittent wheezes []  Wheezes []    Cough Strong, Spontan., & nonproductive [x]  Strong,  spontaneous, &  productive []  Weak,  Nonproductive []  Weak, productive or  with wheezes []  No spontaneous  cough or may require suctioning []    Level of Activity Ambulatory []  Ambulatory w/ Assist  [x]  Non-ambulatory []  Paraplegic []  Quadriplegic []    Total    Score:___5____     Triage Score:___5_____      Tri       Triage:     1. (>20) Freq: Q3    2. (16-20) Freq: Q4   3. (11-15) Freq: QID & Albuterol Q2 PRN    4. (6-10) Freq: TID & Albuterol Q2 PRN    5. (0-5) Freq Q4prn

## 2019-05-26 NOTE — PROGRESS NOTES
Neurology Daily Progress Note  Name: Willian Rowland  Age: 79 y.o. Gender: female  CodeStatus: Full Code  Allergies: No Known Allergies    Chief Complaint:Extremity Weakness (right leg weakness, woke up from a nap at 1730 today and right leg is weak)improved though fluctuates    Primary Care Provider: Miranda Goodpasture, MD  InpatientTreatment Team: Treatment Team: Attending Provider: Pooja Jaramillo MD; Consulting Physician: James Morris MD; Consulting Physician: uRchi Montelongo MD; Consulting Physician: Akhil Finney MD; Consulting Physician: Nita Butler DO; Registered Nurse: David Biggs, RN; : Seema Ramírez, RN; Registered Nurse: Aashish Tam RN  Admission Date: 5/17/2019      HPI Pt seen and examined for neuro follow up for RLE weakness urinary retention and bladder incontinence. A&O x3, NAD, cooperative. No new focal neuro deficits. Still with RLE weakness 4/5. Pt with urinary frequency. Had some urinary retention after admission. Intermittent urinary incontinence as well. Denies Headache, double vision, blurry vision, difficulty with speech, difficulty with swallowing, numbness, pain, nausea, vomiting, choking, neck pain, dizziness    Vitals:    05/24/19 0719   BP:    Pulse: 96   Resp: 18   Temp:    SpO2: 95%    /74   Pulse 88   Temp 97.5 °F (36.4 °C) (Oral)   Resp 16   Ht 5' 3\" (1.6 m)   Wt 195 lb (88.5 kg)   LMP  (LMP Unknown)   SpO2 94%   BMI 34.54 kg/m²       Physical Exam   Constitutional: She is oriented to person, place, and time. She appears well-nourished. No distress. HENT:   Head: Normocephalic and atraumatic. Eyes: Pupils are equal, round, and reactive to light. EOM are normal.   Neck: Neck supple. No JVD present. Cardiovascular: Normal rate and regular rhythm. Pulmonary/Chest: Effort normal and breath sounds normal. No respiratory distress. Abdominal: Soft. Bowel sounds are normal. She exhibits no distension. There is no tenderness. Musculoskeletal: She exhibits no edema. Neurological: She is alert and oriented to person, place, and time. No cranial nerve deficit. RLE weakness 4/5   Skin: Skin is warm and dry. She is not diaphoretic. Psychiatric: She has a normal mood and affect. Review of Systems   Constitutional: Negative for appetite change, chills, fatigue and fever. HENT: Negative for hearing loss and trouble swallowing. Eyes: Negative for visual disturbance. Respiratory: Negative for cough, chest tightness, shortness of breath and wheezing. Cardiovascular: Negative for chest pain, palpitations and leg swelling. Gastrointestinal: Negative for nausea and vomiting. Genitourinary: Positive for difficulty urinating and frequency. Negative for dysuria and hematuria. Musculoskeletal: Positive for gait problem. Skin: Negative for color change and rash. Neurological: Positive for weakness (RLE). Negative for dizziness, tremors, seizures, syncope, facial asymmetry, speech difficulty, light-headedness, numbness and headaches. Psychiatric/Behavioral: Negative for agitation, confusion and hallucinations. The patient is not nervous/anxious.         Medications:  Reviewed    Infusion Medications:    dextrose       Scheduled Medications:    vitamin D  1,000 Units Oral Lunch    vitamin D  50,000 Units Oral Weekly    ipratropium-albuterol  1 ampule Inhalation TID    oxybutynin  5 mg Oral TID    insulin lispro  10 Units Subcutaneous TID WC    budesonide  0.5 mg Nebulization BID    predniSONE  15 mg Oral Daily    Followed by   Dennis Hagan ON 5/27/2019] predniSONE  10 mg Oral Daily    Followed by   Dennis Hagan ON 5/30/2019] predniSONE  5 mg Oral Daily    midazolam  4 mg Intravenous Once    fentanNYL  100 mcg Intravenous Once    sodium chloride bacteriostatic  30 mL Injection Once    insulin glargine  15 Units Subcutaneous BID    azithromycin  500 mg Oral Daily    ciprofloxacin  500 mg Oral 2 times per day    PORTABLE    CLINICAL HISTORY:   LOSS OF USE OF RIGHT LEG     COMPARISONS: NOVEMBER 24, 2013    FINDINGS: Osseous structures intact. Cardiopericardial silhouette normal. Pulmonary vasculature normal. Lungs clear. Impression NO ACUTE CARDIOPULMONARY DISEASE. Echo No results found for this or any previous visit. Assessment/Plan:  RLE weakness  MRI brain no acute process, T2 hyperintense focus periventricular similar to 1/22/11  Carotid US <50% internal bilat, bilat plaque burden at carotid bifurcation,  TTE showed IAS aneurysm, tech difficulty bubble study  LEONID  negative  ASA 81mg daily  Pt with continued RLE weakness and new urinary retention. MRI lumbar spine shows right foraminal stenosis and compression on exiting L4 nerve root   NSGY consult  Suspicious ALEXANDER CVA not seen on MRI given clinical findings of complete weakness of RLE  In addition pt has L4 radiculopathy which can be addressed as outpatient  Pt with MRI cervical spine 2017 at [facility] that showed severe canal stenosis and significant spondylosis with severe narrowing. MRI also showed multifocal areas of T2 abnormalities concerning for demyelinating disease suspicious for MS. Will repeat MRI cervical spine and thoracic spine     MRI seen and reviewed, low suggestion of demyelination disease, Cervical stenosis, moderate, not surgical  Could still be primary relapsing or secondary relapsing MS. Findings only based on comparison MRI done at [facility] and improvement of MRI. No recommended treatment for now. Tapering steroids. Pt independatly intermittently walking  Suspect more cognitive issues then physical  If remains independent witll d/c home with [facility]  Pt unable to go to [facility] rehab due to insurance. Plan for [facility]  OK to DC, still prefers to go to SNF  Follow up 4-6 weeks  Issue with precert     Leonel Han MD, Laura Bustamante, American Board of Psychiatry & Neurology  Board Certified in Vascular Neurology  Board Certified in

## 2019-05-26 NOTE — FLOWSHEET NOTE
2119: vital signs obtained. Assessment complete and charted. Patient denies any c/o pain or shortness of breath. 2130: Patient given snack of turkey roll up and diet ginger ale. 0030: vitals and neuro assessment completed. Assisted patient to bedside commode. Patient was able to independently transfer from bed to commode with just supervision. 7823: Neuro assessment completed. Vitals obtained and filed in [facility].

## 2019-05-27 LAB
GLUCOSE BLD-MCNC: 151 MG/DL (ref 60–115)
GLUCOSE BLD-MCNC: 151 MG/DL (ref 60–115)
GLUCOSE BLD-MCNC: 165 MG/DL (ref 60–115)
GLUCOSE BLD-MCNC: 85 MG/DL (ref 60–115)
GLUCOSE BLD-MCNC: 93 MG/DL (ref 60–115)
PERFORMED ON: ABNORMAL
PERFORMED ON: NORMAL
PERFORMED ON: NORMAL

## 2019-05-27 PROCEDURE — 6370000000 HC RX 637 (ALT 250 FOR IP): Performed by: PHYSICAL MEDICINE & REHABILITATION

## 2019-05-27 PROCEDURE — 1210000000 HC MED SURG R&B

## 2019-05-27 PROCEDURE — 99222 1ST HOSP IP/OBS MODERATE 55: CPT | Performed by: INTERNAL MEDICINE

## 2019-05-27 PROCEDURE — 6370000000 HC RX 637 (ALT 250 FOR IP): Performed by: INTERNAL MEDICINE

## 2019-05-27 PROCEDURE — 6360000002 HC RX W HCPCS: Performed by: INTERNAL MEDICINE

## 2019-05-27 PROCEDURE — 94640 AIRWAY INHALATION TREATMENT: CPT

## 2019-05-27 PROCEDURE — 2580000003 HC RX 258: Performed by: INTERNAL MEDICINE

## 2019-05-27 RX ADMIN — ASPIRIN 81 MG: 81 TABLET, COATED ORAL at 09:44

## 2019-05-27 RX ADMIN — INSULIN LISPRO 2 UNITS: 100 INJECTION, SOLUTION INTRAVENOUS; SUBCUTANEOUS at 12:21

## 2019-05-27 RX ADMIN — GUAIFENESIN 600 MG: 600 TABLET, EXTENDED RELEASE ORAL at 19:58

## 2019-05-27 RX ADMIN — INSULIN GLARGINE 15 UNITS: 100 INJECTION, SOLUTION SUBCUTANEOUS at 09:46

## 2019-05-27 RX ADMIN — Medication 10 ML: at 09:46

## 2019-05-27 RX ADMIN — OXYBUTYNIN CHLORIDE 5 MG: 5 TABLET ORAL at 16:46

## 2019-05-27 RX ADMIN — ENOXAPARIN SODIUM 40 MG: 40 INJECTION SUBCUTANEOUS at 09:44

## 2019-05-27 RX ADMIN — OXYBUTYNIN CHLORIDE 5 MG: 5 TABLET ORAL at 12:21

## 2019-05-27 RX ADMIN — PREDNISONE 10 MG: 5 TABLET ORAL at 09:44

## 2019-05-27 RX ADMIN — ATORVASTATIN CALCIUM 40 MG: 40 TABLET, FILM COATED ORAL at 19:58

## 2019-05-27 RX ADMIN — OXYBUTYNIN CHLORIDE 5 MG: 5 TABLET ORAL at 09:44

## 2019-05-27 RX ADMIN — LISINOPRIL 5 MG: 5 TABLET ORAL at 09:44

## 2019-05-27 RX ADMIN — GUAIFENESIN 600 MG: 600 TABLET, EXTENDED RELEASE ORAL at 09:44

## 2019-05-27 RX ADMIN — OXYBUTYNIN CHLORIDE 5 MG: 5 TABLET ORAL at 19:58

## 2019-05-27 RX ADMIN — INSULIN LISPRO 2 UNITS: 100 INJECTION, SOLUTION INTRAVENOUS; SUBCUTANEOUS at 16:46

## 2019-05-27 RX ADMIN — BUDESONIDE 500 MCG: 0.5 INHALANT RESPIRATORY (INHALATION) at 20:24

## 2019-05-27 RX ADMIN — BUDESONIDE 500 MCG: 0.5 INHALANT RESPIRATORY (INHALATION) at 07:08

## 2019-05-27 RX ADMIN — AZITHROMYCIN MONOHYDRATE 500 MG: 250 TABLET ORAL at 09:44

## 2019-05-27 RX ADMIN — VITAMIN D, TAB 1000IU (100/BT) 1000 UNITS: 25 TAB at 12:21

## 2019-05-27 ASSESSMENT — PAIN SCALES - GENERAL
PAINLEVEL_OUTOF10: 0

## 2019-05-27 ASSESSMENT — ENCOUNTER SYMPTOMS
CHOKING: 0
ABDOMINAL DISTENTION: 0
BACK PAIN: 0
ABDOMINAL PAIN: 0
COLOR CHANGE: 0
APNEA: 0
CHEST TIGHTNESS: 0

## 2019-05-27 NOTE — CONSULTS
[facility] CONSULT NOTE    Patient: Lucila Jeffery  Unit/Bed: Y138/X666-47  YOB: 1950  MRN: [id number]  Acct: [de-identified]   Admitting Diagnosis: TIA (transient ischemic attack) [G45.9]  TIA (transient ischemic attack) [G45.9]  Admit Date:  2019  Hospital Day: 8      Chief Complaint: TIA    History of Present Illness: 79year old female with recent evaluation by Dr. Amita Roque for possible TIA. Negative LEONID. No prior cardiac history. No chest pain or shortness of breath. Normal LV.     PMHx:  Past Medical History:   Diagnosis Date    Asthma     Bladder infection     Chicken pox     Chronic back pain     Hypertension     Measles     Mumps     Osteoarthritis     Pneumonia     Whooping cough        PSHx:  Past Surgical History:   Procedure Laterality Date    APPENDECTOMY      HYSTERECTOMY  26    TONSILLECTOMY  1966       Social Hx:  Social History     Socioeconomic History    Marital status:      Spouse name: None    Number of children: None    Years of education: None    Highest education level: None   Occupational History    None   Social Needs    Financial resource strain: None    Food insecurity:     Worry: None     Inability: None    Transportation needs:     Medical: None     Non-medical: None   Tobacco Use    Smoking status: Former Smoker     Last attempt to quit: 10/19/2009     Years since quittin.6    Smokeless tobacco: Never Used   Substance and Sexual Activity    Alcohol use: No     Comment: recovering alcoholic    Drug use: Never    Sexual activity: None   Lifestyle    Physical activity:     Days per week: None     Minutes per session: None    Stress: None   Relationships    Social connections:     Talks on phone: None     Gets together: None     Attends Bahai service: None     Active member of club or organization: None     Attends meetings of clubs or organizations: None     Relationship status: None    Intimate partner violence: Fear of current or ex partner: None     Emotionally abused: None     Physically abused: None     Forced sexual activity: None   Other Topics Concern    None   Social History Narrative         Lives With: Alone    Type of Home: Apartment: One level    Home Access: Level entry    Bathroom Shower/Tub: Tub/Shower unit    Bathroom Equipment: Shower chair    Home Equipment: Rolling walker, Oxygen(pt unsure of L o2 at home )    ADL Assistance: [address]: Needs assistance(pt reported dtr assist with cleaning )    Ambulation Assistance: Independent    Transfer Assistance: Independent    Active : No(dtr drives )       Family Hx:  Family History   Problem Relation Age of Onset    Cancer Mother     Cancer Father        Review of Systems:   Review of Systems   Constitutional: Negative for activity change and appetite change. HENT: Negative for congestion. Respiratory: Negative for apnea, choking and chest tightness. Cardiovascular: Negative for chest pain. Gastrointestinal: Negative for abdominal distention and abdominal pain. Endocrine: Negative for cold intolerance and heat intolerance. Genitourinary: Negative for dysuria and enuresis. Musculoskeletal: Negative for arthralgias and back pain. Skin: Negative for color change. Neurological: Negative for dizziness, seizures, syncope and light-headedness. Psychiatric/Behavioral: Negative for agitation, behavioral problems and confusion.        Allergies:  No Known Allergies    Current Medications:    Current Facility-Administered Medications:     oxybutynin (DITROPAN) tablet 5 mg, 5 mg, Oral, 4x Daily, Karon Thompson MD, 5 mg at 05/26/19 2049    ipratropium-albuterol (DUONEB) nebulizer solution 1 ampule, 1 ampule, Inhalation, Q4H PRN, Kassandra Madrigal MD, 1 ampule at 05/26/19 0716    acetaminophen (TYLENOL) tablet 325 mg, 325 mg, Oral, Q4H PRN, Kirti Enciso DO    vitamin D (CHOLECALCIFEROL) tablet 1,000 Units, 1,000 Units, Oral, Lunch, Kirti Scullin, DO, 1,000 Units at 05/26/19 1252    vitamin D (ERGOCALCIFEROL) capsule 50,000 Units, 50,000 Units, Oral, Weekly, Kirti Scullin, DO, 50,000 Units at 05/23/19 2113    insulin lispro (HUMALOG) injection vial 10 Units, 10 Units, Subcutaneous, TID WC, Mateo Anthony MD, 10 Units at 05/26/19 1654    budesonide (PULMICORT) nebulizer suspension 500 mcg, 0.5 mg, Nebulization, BID, Mateo Anthony MD, 500 mcg at 05/27/19 0708    [COMPLETED] predniSONE (DELTASONE) tablet 20 mg, 20 mg, Oral, Daily, 20 mg at 05/23/19 0819 **FOLLOWED BY** [COMPLETED] predniSONE (DELTASONE) tablet 15 mg, 15 mg, Oral, Daily, 15 mg at 05/26/19 0933 **FOLLOWED BY** predniSONE (DELTASONE) tablet 10 mg, 10 mg, Oral, Daily **FOLLOWED BY** [START ON 5/30/2019] predniSONE (DELTASONE) tablet 5 mg, 5 mg, Oral, Daily, Colon Mohs, MD    lidocaine viscous hcl (XYLOCAINE) 2 % solution 15 mL, 15 mL, Mouth/Throat, Q3H PRN, Jemima Mcmanus MD    midazolam (VERSED) injection 4 mg, 4 mg, Intravenous, Once, Jemima Mcmanus MD    fentaNYL (SUBLIMAZE) injection 100 mcg, 100 mcg, Intravenous, Once, Jemima Mcmanus MD    sodium chloride bacteriostatic 0.9 % injection 30 mL, 30 mL, Injection, Once, Jemima Mcmanus MD    insulin glargine (LANTUS) injection vial 15 Units, 15 Units, Subcutaneous, BID, Mateo Anthony MD, 15 Units at 05/26/19 2050    azithromycin (ZITHROMAX) tablet 500 mg, 500 mg, Oral, Daily, Mateo Anthony MD, 500 mg at 05/26/19 0934    lisinopril (PRINIVIL;ZESTRIL) tablet 5 mg, 5 mg, Oral, Daily, Troy RAYMOND Holiday, DO, 5 mg at 05/26/19 0934    glucose (GLUTOSE) 40 % oral gel 15 g, 15 g, Oral, PRN, Christopher RAGLAND Sedar, DO    dextrose 50 % solution 12.5 g, 12.5 g, Intravenous, PRN, Gabriela Allan Sedar, DO    glucagon (rDNA) injection 1 mg, 1 mg, Intramuscular, PRN, Gabriela Allan Sedar, DO    dextrose 5 % solution, 100 mL/hr, Intravenous, PRN, Gabrielaestrella Allan Sedar, DO    insulin lispro (HUMALOG) injection vial 0-12 Units, 0-12 Units, Subcutaneous, TID WC, Awanda Muscat Sedar, DO, 2 Units at 05/26/19 1656    insulin lispro (HUMALOG) injection vial 0-6 Units, 0-6 Units, Subcutaneous, Nightly, Awanda Muscat Sedar, DO, 3 Units at 05/26/19 2050    guaiFENesin [facility]) extended release tablet 600 mg, 600 mg, Oral, BID, Awanda Muscat Sedar, DO, 600 mg at 05/26/19 2049    sodium chloride flush 0.9 % injection 10 mL, 10 mL, Intravenous, 2 times per day, Awanda Muscat Sedar, DO, 10 mL at 05/24/19 0834    sodium chloride flush 0.9 % injection 10 mL, 10 mL, Intravenous, PRN, Awanda Muscat Sedar, DO    magnesium hydroxide (MILK OF MAGNESIA) 400 MG/5ML suspension 30 mL, 30 mL, Oral, Daily PRN, Anai RAGLAND Sedar, DO    ondansetron (ZOFRAN) injection 4 mg, 4 mg, Intravenous, Q6H PRN, Awanda Muscat Sedar, DO    enoxaparin (LOVENOX) injection 40 mg, 40 mg, Subcutaneous, Daily, Getachew D Sedar, DO, 40 mg at 05/26/19 0933    labetalol (NORMODYNE;TRANDATE) injection syringe 10 mg, 10 mg, Intravenous, Q10 Min PRN, Awanda Muscat Sedar, DO    aspirin EC tablet 81 mg, 81 mg, Oral, Daily, Awanda Muscat Sedar, DO, 81 mg at 05/26/19 0934    atorvastatin (LIPITOR) tablet 40 mg, 40 mg, Oral, Nightly, Getachew D Sedar, DO, 40 mg at 05/26/19 2050    Physical Examination:    BP (!) 143/76   Pulse 79   Temp 97.9 °F (36.6 °C) (Oral)   Resp 16   Ht 5' 3\" (1.6 m)   Wt 195 lb (88.5 kg)   LMP  (LMP Unknown)   SpO2 94%   BMI 34.54 kg/m²    Physical Exam   Constitutional: She is oriented to person, place, and time. She appears well-developed and well-nourished. HENT:   Head: Normocephalic and atraumatic. Eyes: Pupils are equal, round, and reactive to light. Neck: Normal range of motion. Neck supple. Cardiovascular: Normal rate and regular rhythm. Exam reveals no gallop and no friction rub. No murmur heard. Pulmonary/Chest: Effort normal and breath sounds normal. No respiratory distress. She has no wheezes. She has no rales. She exhibits no tenderness. Abdominal: Soft. Bowel sounds are normal. She exhibits no distension. There is no tenderness. Musculoskeletal: Normal range of motion. She exhibits no edema. Neurological: She is alert and oriented to person, place, and time. No cranial nerve deficit. Coordination normal.   Skin: Skin is warm and dry. Psychiatric: She has a normal mood and affect.        LABS:  CBC:   Lab Results   Component Value Date    WBC 9.8 05/23/2019    RBC 4.26 05/23/2019    HGB 12.9 05/23/2019    HCT 38.0 05/23/2019    MCV 89.2 05/23/2019    MCH 30.3 05/23/2019    MCHC 34.0 05/23/2019    RDW 14.0 05/23/2019     05/23/2019    MPV 9.0 08/29/2015     CBC with Differential:   Lab Results   Component Value Date    WBC 9.8 05/23/2019    RBC 4.26 05/23/2019    HGB 12.9 05/23/2019    HCT 38.0 05/23/2019     05/23/2019    MCV 89.2 05/23/2019    MCH 30.3 05/23/2019    MCHC 34.0 05/23/2019    RDW 14.0 05/23/2019    LYMPHOPCT 27.0 05/17/2019    MONOPCT 4.9 05/17/2019    BASOPCT 0.6 05/17/2019    MONOSABS 0.5 05/17/2019    LYMPHSABS 2.5 05/17/2019    EOSABS 0.5 05/17/2019    BASOSABS 0.1 05/17/2019     CMP:    Lab Results   Component Value Date     05/23/2019    K 3.9 05/23/2019     05/23/2019    CO2 25 05/23/2019    BUN 17 05/23/2019    CREATININE 0.53 05/23/2019    GFRAA >60.0 05/23/2019    LABGLOM >60.0 05/23/2019    GLUCOSE 283 05/23/2019    PROT 7.2 05/17/2019    LABALBU 4.1 05/17/2019    CALCIUM 9.3 05/23/2019    BILITOT 0.3 05/17/2019    ALKPHOS 89 05/17/2019    AST 15 05/17/2019    ALT 12 05/17/2019     BMP:    Lab Results   Component Value Date     05/23/2019    K 3.9 05/23/2019     05/23/2019    CO2 25 05/23/2019    BUN 17 05/23/2019    LABALBU 4.1 05/17/2019    CREATININE 0.53 05/23/2019    CALCIUM 9.3 05/23/2019    GFRAA >60.0 05/23/2019    LABGLOM >60.0 05/23/2019    GLUCOSE 283 05/23/2019     Magnesium:    Lab Results   Component Value Date    MG 1.8 11/24/2013     Troponin:    Lab Results   Component Value Date    TROPONINI <0.010 05/18/2019       EKG: EKG: normal sinus rhythm. ASSESSMENT/PLAN:         Active Hospital Problems    Diagnosis Date Noted    Cerebrovascular accident (CVA) due to occlusion of small artery [I63.81]      Priority: High    Cerebrovascular accident (CVA) determined by clinical assessment likely ALEXANDER with Right kip ([address] [I63.9] 05/22/2019    Urinary incontinence [R32] 05/22/2019    Right leg weakness [R29.898] 05/22/2019    Type 2 diabetes mellitus with hyperglycemia, with long-term current use of insulin ([address] [E11.65, Z79.4] 05/22/2019    Mild intermittent asthma with exacerbation [J45.21]     Shortness of breath [R06.02]     TIA (transient ischemic attack) [G45.9] 05/17/2019    Orthostatic hypotension [I95.1] 12/02/2017    Cervical spinal stenosis [M48.02] 12/02/2017    Carotid artery plaque, left [I65.22] 12/02/2017    Type 2 diabetes mellitus without complication, with long-term current use of insulin ([address] [E11.9, Z79.4] 02/22/2017    Cervical radicular pain [M54.12] 01/20/2017    Lumbar and sacral osteoarthritis [M47.817] 09/28/2015    Cervical spondylosis with myelopathy [M47.12] 07/19/2007        Preprocedural cardiac evaluation: Patient presents for preoperative risk evaluation for non-cardiac surgery. The patient can sustain 4 METS activity without symptoms. This patient's cardiac risk is low to intermediate with a history of none. The surgery planned is low to intermediate cardiac risk. The patient is acceptable risk for surgery with no further evaluation. Electronically signed by Peggy Salas.  Cherise Henry MD [facility] Director of Cardiology Services and CardiacCatheterization Laboratory  [facility], [place]   on 5/27/2019 at 8:40 AM

## 2019-05-28 VITALS
DIASTOLIC BLOOD PRESSURE: 88 MMHG | BODY MASS INDEX: 34.55 KG/M2 | HEART RATE: 93 BPM | SYSTOLIC BLOOD PRESSURE: 155 MMHG | HEIGHT: 63 IN | RESPIRATION RATE: 18 BRPM | TEMPERATURE: 97.7 F | OXYGEN SATURATION: 98 % | WEIGHT: 195 LBS

## 2019-05-28 DIAGNOSIS — M47.12 CERVICAL SPONDYLOSIS WITH MYELOPATHY: ICD-10-CM

## 2019-05-28 DIAGNOSIS — M48.02 CERVICAL SPINAL STENOSIS: Primary | ICD-10-CM

## 2019-05-28 DIAGNOSIS — M54.12 CERVICAL RADICULAR PAIN: ICD-10-CM

## 2019-05-28 LAB
GLUCOSE BLD-MCNC: 112 MG/DL (ref 60–115)
GLUCOSE BLD-MCNC: 120 MG/DL (ref 60–115)
GLUCOSE BLD-MCNC: 155 MG/DL (ref 60–115)
PERFORMED ON: ABNORMAL
PERFORMED ON: ABNORMAL
PERFORMED ON: NORMAL

## 2019-05-28 PROCEDURE — 6370000000 HC RX 637 (ALT 250 FOR IP): Performed by: INTERNAL MEDICINE

## 2019-05-28 PROCEDURE — 99232 SBSQ HOSP IP/OBS MODERATE 35: CPT | Performed by: INTERNAL MEDICINE

## 2019-05-28 PROCEDURE — 6360000002 HC RX W HCPCS: Performed by: INTERNAL MEDICINE

## 2019-05-28 PROCEDURE — 94760 N-INVAS EAR/PLS OXIMETRY 1: CPT

## 2019-05-28 PROCEDURE — 94640 AIRWAY INHALATION TREATMENT: CPT

## 2019-05-28 PROCEDURE — 99222 1ST HOSP IP/OBS MODERATE 55: CPT | Performed by: INTERNAL MEDICINE

## 2019-05-28 PROCEDURE — 6370000000 HC RX 637 (ALT 250 FOR IP): Performed by: PHYSICAL MEDICINE & REHABILITATION

## 2019-05-28 PROCEDURE — 97116 GAIT TRAINING THERAPY: CPT

## 2019-05-28 RX ORDER — ERGOCALCIFEROL (VITAMIN D2) 1250 MCG
50000 CAPSULE ORAL WEEKLY
Qty: 5 CAPSULE | Refills: 0 | Status: SHIPPED | OUTPATIENT
Start: 2019-05-30 | End: 2019-08-27

## 2019-05-28 RX ORDER — INSULIN GLARGINE 100 [IU]/ML
15 INJECTION, SOLUTION SUBCUTANEOUS 2 TIMES DAILY
Qty: 1 VIAL | Refills: 3 | Status: SHIPPED | OUTPATIENT
Start: 2019-05-28 | End: 2019-05-28 | Stop reason: HOSPADM

## 2019-05-28 RX ORDER — LISINOPRIL 5 MG/1
5 TABLET ORAL DAILY
Qty: 30 TABLET | Refills: 3 | Status: SHIPPED | OUTPATIENT
Start: 2019-05-29

## 2019-05-28 RX ORDER — INSULIN GLARGINE 100 [IU]/ML
15 INJECTION, SOLUTION SUBCUTANEOUS NIGHTLY
Qty: 1 VIAL | Refills: 3 | Status: ON HOLD | OUTPATIENT
Start: 2019-05-29 | End: 2019-10-18 | Stop reason: CLARIF

## 2019-05-28 RX ORDER — BUDESONIDE 0.5 MG/2ML
0.5 INHALANT ORAL 2 TIMES DAILY
Qty: 60 AMPULE | Refills: 3 | Status: SHIPPED | OUTPATIENT
Start: 2019-05-28

## 2019-05-28 RX ORDER — OXYBUTYNIN CHLORIDE 5 MG/1
5 TABLET ORAL 4 TIMES DAILY
Qty: 90 TABLET | Refills: 3 | Status: ON HOLD | OUTPATIENT
Start: 2019-05-28 | End: 2019-10-21 | Stop reason: HOSPADM

## 2019-05-28 RX ORDER — INSULIN GLARGINE 100 [IU]/ML
15 INJECTION, SOLUTION SUBCUTANEOUS NIGHTLY
Status: DISCONTINUED | OUTPATIENT
Start: 2019-05-29 | End: 2019-05-28 | Stop reason: HOSPADM

## 2019-05-28 RX ORDER — ATORVASTATIN CALCIUM 40 MG/1
40 TABLET, FILM COATED ORAL NIGHTLY
Qty: 30 TABLET | Refills: 3 | Status: ON HOLD | OUTPATIENT
Start: 2019-05-28 | End: 2019-10-18

## 2019-05-28 RX ORDER — IPRATROPIUM BROMIDE AND ALBUTEROL SULFATE 2.5; .5 MG/3ML; MG/3ML
3 SOLUTION RESPIRATORY (INHALATION) EVERY 4 HOURS PRN
Qty: 360 ML | Refills: 0 | Status: SHIPPED | OUTPATIENT
Start: 2019-05-28

## 2019-05-28 RX ORDER — ASPIRIN 81 MG/1
81 TABLET ORAL DAILY
Qty: 30 TABLET | Refills: 3 | Status: SHIPPED | OUTPATIENT
Start: 2019-05-29

## 2019-05-28 RX ADMIN — INSULIN LISPRO 2 UNITS: 100 INJECTION, SOLUTION INTRAVENOUS; SUBCUTANEOUS at 17:46

## 2019-05-28 RX ADMIN — AZITHROMYCIN MONOHYDRATE 500 MG: 250 TABLET ORAL at 08:51

## 2019-05-28 RX ADMIN — LISINOPRIL 5 MG: 5 TABLET ORAL at 08:51

## 2019-05-28 RX ADMIN — ASPIRIN 81 MG: 81 TABLET, COATED ORAL at 08:51

## 2019-05-28 RX ADMIN — OXYBUTYNIN CHLORIDE 5 MG: 5 TABLET ORAL at 12:04

## 2019-05-28 RX ADMIN — INSULIN GLARGINE 15 UNITS: 100 INJECTION, SOLUTION SUBCUTANEOUS at 08:56

## 2019-05-28 RX ADMIN — PREDNISONE 10 MG: 5 TABLET ORAL at 08:51

## 2019-05-28 RX ADMIN — ENOXAPARIN SODIUM 40 MG: 40 INJECTION SUBCUTANEOUS at 08:56

## 2019-05-28 RX ADMIN — GUAIFENESIN 600 MG: 600 TABLET, EXTENDED RELEASE ORAL at 08:51

## 2019-05-28 RX ADMIN — BUDESONIDE 500 MCG: 0.5 INHALANT RESPIRATORY (INHALATION) at 09:16

## 2019-05-28 RX ADMIN — OXYBUTYNIN CHLORIDE 5 MG: 5 TABLET ORAL at 08:51

## 2019-05-28 RX ADMIN — VITAMIN D, TAB 1000IU (100/BT) 1000 UNITS: 25 TAB at 12:04

## 2019-05-28 RX ADMIN — OXYBUTYNIN CHLORIDE 5 MG: 5 TABLET ORAL at 17:46

## 2019-05-28 ASSESSMENT — ENCOUNTER SYMPTOMS
COLOR CHANGE: 0
NAUSEA: 0
RESPIRATORY NEGATIVE: 1
DIARRHEA: 0
TROUBLE SWALLOWING: 0
BLOOD IN STOOL: 0
CHEST TIGHTNESS: 0
GASTROINTESTINAL NEGATIVE: 1
STRIDOR: 0
SHORTNESS OF BREATH: 0
EYES NEGATIVE: 1
WHEEZING: 0
VOMITING: 0
COUGH: 0

## 2019-05-28 ASSESSMENT — PAIN SCALES - GENERAL
PAINLEVEL_OUTOF10: 0

## 2019-05-28 NOTE — PROGRESS NOTES
Physical Therapy Med Surg Daily Treatment Note  Facility/Department: [address] NEURO  Room: N225/N2[facility]       NAME: Barbra Lan  : 1950 (79 y.o.)  MRN: [id number]  CODE STATUS: Full Code    Date of Service: 2019    Patient Diagnosis(es): TIA (transient ischemic attack) [G45.9]  TIA (transient ischemic attack) [G45.9]   Chief Complaint   Patient presents with    Extremity Weakness     right leg weakness, woke up from a nap at 1730 today and right leg is weak     Patient Active Problem List    Diagnosis Date Noted    Cerebrovascular accident (CVA) due to occlusion of small artery      Priority: High    Cerebrovascular accident (CVA) determined by clinical assessment likely ALEXANDER with Right kip ([address] 2019    Urinary incontinence 2019    Right leg weakness 2019    Type 2 diabetes mellitus with hyperglycemia, with long-term current use of insulin ([address] 2019    Mild intermittent asthma with exacerbation     Shortness of breath     TIA (transient ischemic attack) 2019    Right foot drop 02/15/2019    Mixed hyperlipidemia 2018    Chronic obstructive pulmonary disease ([address] 2018    Carotid artery plaque, left 2017    Cervical spinal stenosis 2017    Orthostatic hypotension 2017    Mild intermittent asthma without complication     Temporary cerebral vascular dysfunction 2017    Type 2 diabetes mellitus with vascular disease ([address] 2017    Type 2 diabetes mellitus without complication, with long-term current use of insulin ([address] 2017    Cervical radicular pain 2017    Essential hypertension 2017    Lumbosacral spondylosis without myelopathy 2015    Lumbar and sacral osteoarthritis 2015    Cervical spondylosis with myelopathy 2007        Past Medical History:   Diagnosis Date    Asthma     Bladder infection     Chicken pox     Chronic back pain     Hypertension     Measles  Mumps     Osteoarthritis     Pneumonia     Whooping cough      Past Surgical History:   Procedure Laterality Date    APPENDECTOMY  1978    HYSTERECTOMY  1983    TONSILLECTOMY  1966          Restrictions:  Restrictions/Precautions: Fall Risk    SUBJECTIVE:  Subjective  Subjective: My R side is still weak. Pre Pain Assessment:  Pre Treatment Pain Screening  Pain at present: 0  Intervention List: Patient able to continue with treatment          Post Pain Assessment:   Pain Assessment  Pain Level: 0       OBJECTIVE:         Bed mobility  Supine to Sit: Stand by assistance  Comment: bed flat with use of hand rails; increased time and effort to complete    Transfers  Sit to Stand: Stand by assistance  Stand to sit: Stand by assistance  Comment: vc's and demo for safety and proper technique; Demo'd 2 hand push up for increased safety    Ambulation  Ambulation?: Yes  Ambulation 1  Surface: level tile  Device: Rolling Walker  Assistance: Contact guard assistance  Quality of Gait: Reciprocal patter with shortened B step length and decreased toe clearance on R; Pt dragging R foot  Gait Deviations: Decreased step length; Slow Angela  Distance: 40'x2  Comments: increased cues with change of direction  Stairs/Curb  Stairs?: No          Exercises  Comments: Reviewed HEP                     ASSESSMENT:  Body structures, Functions, Activity limitations: Decreased functional mobility ; Decreased ROM; Decreased balance;Decreased strength;Decreased high-level IADLs    Assessment: Pt presenting with R foot drop and decreased clearance during ambulation; easily distracted in busy area. Increased cueing for change in direction and approach to chair.      Activity Tolerance  Activity Tolerance: Patient Tolerated treatment well       Discharge Recommendations:  Continue to assess pending progress    Goals:  Long term goals  Long term goal 1: indep with HEP to improve R LE strength  Long term goal 2: bed mobility with indep   Long term goal 3: functional transfers with mod I  Long term goal 4: amb 50ft with LRAD and mod I  Patient Goals   Patient goals : \"walk better, go home\"    PLAN:   Plan  Times per week: 3-6  Current Treatment Recommendations: Strengthening, ROM, Functional Mobility Training, Neuromuscular Re-education, Manual Therapy - Joint Manipulation, Home Exercise Program, Equipment Evaluation, Education, & procurement, Modalities, Manual Therapy - Soft Tissue Mobilization, Gait Training, Transfer Training, Balance Training, Endurance Training, Stair training, Pain Management, Patient/Caregiver Education & Training, Safety Education & Training, Positioning  Plan Comment: cont current POC  Safety Devices  Type of devices:  All fall risk precautions in place, Call light within reach, Chair alarm in place, Left in chair     AMPA (6 CLICK) [address] Inpatient Mobility Raw Score : 18      Therapy Time   Individual   Time In 1025   Time Out 1038   Minutes 13      gait - 10 mins  Bm/Trsf - 3 mins       Nanci Rice PTA, 05/28/19 at 10:44 AM

## 2019-05-28 NOTE — PROGRESS NOTES
None   Other Topics Concern    None   Social History Narrative         Lives With: Alone    Type of Home: Apartment: One level    Home Access: Level entry    Bathroom Shower/Tub: Tub/Shower unit    Bathroom Equipment: Shower chair    Home Equipment: Rolling walker, Oxygen(pt unsure of L o2 at home )    ADL Assistance: [place]: Needs assistance(pt reported dtr assist with cleaning )    Ambulation Assistance: Independent    Transfer Assistance: Independent    Active : No(dtr drives )       Subjective/HPI no cp no sob eating fine. stil weak    EKG:        Review of Systems:   Review of Systems   Constitutional: Negative. Negative for diaphoresis and fatigue. HENT: Negative. Eyes: Negative. Respiratory: Negative. Negative for cough, chest tightness, shortness of breath, wheezing and stridor. Cardiovascular: Negative. Negative for chest pain, palpitations and leg swelling. Gastrointestinal: Negative. Negative for blood in stool and nausea. Genitourinary: Negative. Musculoskeletal: Negative. Skin: Negative. Neurological: Positive for weakness and numbness. Negative for dizziness, syncope and light-headedness. Hematological: Negative. Psychiatric/Behavioral: Negative. Physical Examination:    BP (!) 146/73   Pulse 75   Temp 97.9 °F (36.6 °C) (Oral)   Resp 18   Ht 5' 3\" (1.6 m)   Wt 195 lb (88.5 kg)   LMP  (LMP Unknown)   SpO2 97%   BMI 34.54 kg/m²    Physical Exam   Constitutional: She appears healthy. No distress. HENT:   Normal cephalic and Atraumatic   Eyes: Pupils are equal, round, and reactive to light. Neck: Normal range of motion and thyroid normal. Neck supple. No JVD present. No neck adenopathy. No thyromegaly present. Cardiovascular: Normal rate, regular rhythm, intact distal pulses and normal pulses. Murmur heard. Pulmonary/Chest: Effort normal and breath sounds normal. She has no wheezes. She has no rales.  She exhibits no tenderness. Abdominal: Soft. Bowel sounds are normal. There is no tenderness. Musculoskeletal: Normal range of motion. She exhibits no edema or tenderness. Neurological: She is alert and oriented to person, place, and time. Skin: Skin is warm. No cyanosis. Nails show no clubbing.        LABS:  CBC:   Lab Results   Component Value Date    WBC 9.8 05/23/2019    RBC 4.26 05/23/2019    HGB 12.9 05/23/2019    HCT 38.0 05/23/2019    MCV 89.2 05/23/2019    MCH 30.3 05/23/2019    MCHC 34.0 05/23/2019    RDW 14.0 05/23/2019     05/23/2019    MPV 9.0 08/29/2015     CBC with Differential:    Lab Results   Component Value Date    WBC 9.8 05/23/2019    RBC 4.26 05/23/2019    HGB 12.9 05/23/2019    HCT 38.0 05/23/2019     05/23/2019    MCV 89.2 05/23/2019    MCH 30.3 05/23/2019    MCHC 34.0 05/23/2019    RDW 14.0 05/23/2019    LYMPHOPCT 27.0 05/17/2019    MONOPCT 4.9 05/17/2019    BASOPCT 0.6 05/17/2019    MONOSABS 0.5 05/17/2019    LYMPHSABS 2.5 05/17/2019    EOSABS 0.5 05/17/2019    BASOSABS 0.1 05/17/2019     CMP:    Lab Results   Component Value Date     05/23/2019    K 3.9 05/23/2019     05/23/2019    CO2 25 05/23/2019    BUN 17 05/23/2019    CREATININE 0.53 05/23/2019    GFRAA >60.0 05/23/2019    LABGLOM >60.0 05/23/2019    GLUCOSE 283 05/23/2019    PROT 7.2 05/17/2019    LABALBU 4.1 05/17/2019    CALCIUM 9.3 05/23/2019    BILITOT 0.3 05/17/2019    ALKPHOS 89 05/17/2019    AST 15 05/17/2019    ALT 12 05/17/2019     BMP:    Lab Results   Component Value Date     05/23/2019    K 3.9 05/23/2019     05/23/2019    CO2 25 05/23/2019    BUN 17 05/23/2019    LABALBU 4.1 05/17/2019    CREATININE 0.53 05/23/2019    CALCIUM 9.3 05/23/2019    GFRAA >60.0 05/23/2019    LABGLOM >60.0 05/23/2019    GLUCOSE 283 05/23/2019     Magnesium:    Lab Results   Component Value Date    MG 1.8 11/24/2013     Troponin:    Lab Results   Component Value Date    TROPONINI <0.010 05/18/2019 Active Hospital Problems    Diagnosis Date Noted    Cerebrovascular accident (CVA) due to occlusion of small artery [I63.81]      Priority: High    Cerebrovascular accident (CVA) determined by clinical assessment likely ALEXANDER with Right kip ([address] [I63.9] 05/22/2019     Priority: Low    Urinary incontinence [R32] 05/22/2019     Priority: Low    Right leg weakness [R29.898] 05/22/2019     Priority: Low    Type 2 diabetes mellitus with hyperglycemia, with long-term current use of insulin ([facility] [address] [E11.65, Z79.4] 05/22/2019     Priority: Low    Mild intermittent asthma with exacerbation [J45.21]      Priority: Low    Shortness of breath [R06.02]      Priority: Low    TIA (transient ischemic attack) [G45.9] 05/17/2019     Priority: Low    Orthostatic hypotension [I95.1] 12/02/2017     Priority: Low    Cervical spinal stenosis [M48.02] 12/02/2017     Priority: Low    Carotid artery plaque, left [I65.22] 12/02/2017     Priority: Low    Type 2 diabetes mellitus without complication, with long-term current use of insulin ([facility] [address] [E11.9, Z79.4] 02/22/2017     Priority: Low    Cervical radicular pain [M54.12] 01/20/2017     Priority: Low    Lumbar and sacral osteoarthritis [M47.817] 09/28/2015     Priority: Low    Cervical spondylosis with myelopathy [M47.12] 07/19/2007     Priority: Low        Assessment/Plan:  1. TIA  2. LEONID Negative normal LVEF mild MR  3. Preop C spine for cord compression -she is cleared for planned surgery.         Electronically signed by Reza Anguiano MD on 5/28/2019 at 10:56 AM

## 2019-05-28 NOTE — PROGRESS NOTES
Neurology Daily Progress Note  Name: Victoriano Abraham  Age: 79 y.o. Gender: female  CodeStatus: Full Code  Allergies: No Known Allergies    Chief Complaint:Extremity Weakness (right leg weakness, woke up from a nap at 1730 today and right leg is weak)improved though fluctuates    Primary Care Provider: Kleber Medina MD  InpatientTreatment Team: Treatment Team: Attending Provider: Sarita Huizar MD; Consulting Physician: Yonatan Reyes MD; Consulting Physician: Diane Finley MD; Consulting Physician: Sindhu Tineo MD; Consulting Physician: Pacheco Bright DO; Registered Nurse: Vidal Dawn, RN; : Beau Brannon, RN; Registered Nurse: Mac Driscoll RN  Admission Date: 5/17/2019      HPI Pt seen and examined for neuro follow up for RLE weakness urinary retention and bladder incontinence. A&O x3, NAD, cooperative. No new focal neuro deficits. Still with RLE weakness 4/5. Pt with urinary frequency. Had some urinary retention after admission. Intermittent urinary incontinence as well. Denies Headache, double vision, blurry vision, difficulty with speech, difficulty with swallowing, numbness, pain, nausea, vomiting, choking, neck pain, dizziness    Vitals:    05/24/19 0719   BP:    Pulse: 96   Resp: 18   Temp:    SpO2: 95%        Physical Exam   Constitutional: She is oriented to person, place, and time. She appears well-nourished. No distress. HENT:   Head: Normocephalic and atraumatic. Eyes: Pupils are equal, round, and reactive to light. EOM are normal.   Neck: Neck supple. No JVD present. Cardiovascular: Normal rate and regular rhythm. Pulmonary/Chest: Effort normal and breath sounds normal. No respiratory distress. Abdominal: Soft. Bowel sounds are normal. She exhibits no distension. There is no tenderness. Musculoskeletal: She exhibits no edema. Neurological: She is alert and oriented to person, place, and time. No cranial nerve deficit.    RLE weakness 4/5   Skin: Skin is warm flush  10 mL Intravenous 2 times per day    enoxaparin  40 mg Subcutaneous Daily    aspirin  81 mg Oral Daily    atorvastatin  40 mg Oral Nightly     PRN Meds: acetaminophen, lidocaine viscous hcl, albuterol, glucose, dextrose, glucagon (rDNA), dextrose, sodium chloride flush, magnesium hydroxide, ondansetron, labetalol    Labs:   Recent Labs     05/22/19  0525 05/23/19  0549   WBC 10.1 9.8   HGB 13.7 12.9   HCT 40.0 38.0    260     Recent Labs     05/22/19  0525 05/23/19  0548    138   K 4.7 3.9    104   CO2 24 25   BUN 15 17   CREATININE 0.55 0.53   CALCIUM 9.2 9.3     No results for input(s): AST, ALT, BILIDIR, BILITOT, ALKPHOS in the last 72 hours. No results for input(s): INR in the last 72 hours. No results for input(s): [place] in the last 72 hours. Urinalysis:   Lab Results   Component Value Date    NITRU POSITIVE 05/17/2019    WBCUA  05/17/2019    BACTERIA MODERATE 05/17/2019    RBCUA 3-5 05/17/2019    BLOODU Negative 05/17/2019    SPECGRAV 1.038 05/17/2019    GLUCOSEU 250 05/17/2019       Radiology:   Most recent    Chest CT      WITH CONTRAST:No results found for this or any previous visit. WITHOUT CONTRAST: No results found for this or any previous visit. CXR      2-view:   Results for orders placed during the hospital encounter of 05/17/19   XR CHEST STANDARD (2 VW)    Narrative EXAMINATION: XR CHEST (2 VW)    CLINICAL HISTORY: COUGH    COMPARISONS: May 17, 2019. FINDINGS:    Osseous structures intact. Cardiopericardial silhouette normal. Pulmonary vasculature normal. Lungs clear. Impression No acute cardiopulmonary disease. Portable:   Results for orders placed during the hospital encounter of 05/17/19   XR CHEST PORTABLE    Narrative EXAMINATION: XR CHEST PORTABLE    CLINICAL HISTORY:   LOSS OF USE OF RIGHT LEG     COMPARISONS: NOVEMBER 24, 2013    FINDINGS: Osseous structures intact.  Cardiopericardial silhouette normal. Pulmonary vasculature normal. Lungs clear. Impression NO ACUTE CARDIOPULMONARY DISEASE. Echo No results found for this or any previous visit. Assessment/Plan:  RLE weakness  MRI brain no acute process, T2 hyperintense focus periventricular similar to 1/22/11  Carotid US <50% internal bilat, bilat plaque burden at carotid bifurcation,  TTE showed IAS aneurysm, tech difficulty bubble study  LEONID  negative  ASA 81mg daily  Pt with continued RLE weakness and new urinary retention. MRI lumbar spine shows right foraminal stenosis and compression on exiting L4 nerve root   NSGY consult  Suspicious ALEXANDER CVA not seen on MRI given clinical findings of complete weakness of RLE  In addition pt has L4 radiculopathy which can be addressed as outpatient  Pt with MRI cervical spine 2017 at [facility] that showed severe canal stenosis and significant spondylosis with severe narrowing. MRI also showed multifocal areas of T2 abnormalities concerning for demyelinating disease suspicious for MS. Will repeat MRI cervical spine and thoracic spine     MRI seen and reviewed, low suggestion of demyelination disease, Cervical stenosis, moderate, not surgical  Could still be primary relapsing or secondary relapsing MS. Findings only based on comparison MRI done at [facility] and improvement of MRI. No recommended treatment for now. Tapering steroids. Pt independatly intermittently walking  Suspect more cognitive issues then physical  If remains independent [facility] d/c home with [place] [id number]  Pt unable to go to [facility] rehab due to insurance. Plan for [facility]  OK to DC  Follow up 4-6 weeks  Issue with precert   D/c soon    Leonel Green MD, Mariajose Howard, American Board of Psychiatry & Neurology  Board Certified in Vascular Neurology  Board Certified in Neuromuscular Medicine  Certified in Marielena Gibson

## 2019-05-28 NOTE — DISCHARGE INSTR - DIET

## 2019-05-28 NOTE — CARE COORDINATION
RECEIVED CALL FROM [employer]. MEDICAL DIRECTOR IS APPROVING SNF. NOTIFIED PATIENTS RN.   PATIENT WILL BE DISCHARGED TO [facility] TODAY

## 2019-05-28 NOTE — PROGRESS NOTES
Neurology Daily Progress Note  Name: Efrem Reina  Age: 79 y.o. Gender: female  CodeStatus: Full Code  Allergies: No Known Allergies    Chief Complaint:Extremity Weakness (right leg weakness, woke up from a nap at 1730 today and right leg is weak)    Primary Care Provider: Sasha Tristan MD  InpatientTreatment Team: Treatment Team: Attending Provider: Joe Hunter MD; Consulting Physician: Sam Elam MD; Consulting Physician: Emelyn Sherwood MD; Consulting Physician: Lele Weaver MD; Consulting Physician: Simno Calix DO; Consulting Physician: Danita Lakhani MD; : Sumit Paredes, RN; Registered Nurse: Capirce Raymond RN  Admission Date: 5/17/2019      HPI   Pt seen and examined for neuro follow up for RLE weakness. A&O x3, NAD, pleasant and cooperative. Pt ambulating better. Still with RLE weakness although overall improved. .  NO Headache, double vision, blurry vision, difficulty with speech, difficulty with swallowing, numbness, pain, nausea, vomiting, choking, neck pain, dizziness  Vitals:    05/28/19 0720   BP: (!) 146/73   Pulse: 75   Resp:    Temp: 97.9 °F (36.6 °C)   SpO2: 97%        Physical Exam    Review of Systems   Constitutional: Negative for appetite change, chills, fatigue and fever. HENT: Negative for hearing loss and trouble swallowing. Eyes: Negative for visual disturbance. Respiratory: Negative for cough, chest tightness, shortness of breath and wheezing. Cardiovascular: Negative for chest pain, palpitations and leg swelling. Gastrointestinal: Negative for nausea and vomiting. Genitourinary: Positive for difficulty urinating (frequency, incomplete emptying). Musculoskeletal: Positive for gait problem. Skin: Negative for color change and rash. Neurological: Positive for weakness (RLE). Negative for dizziness, tremors, seizures, syncope, facial asymmetry, speech difficulty, light-headedness, numbness and headaches.    Psychiatric/Behavioral: Negative for agitation, confusion and hallucinations. The patient is not nervous/anxious. Medications:  Reviewed    Infusion Medications:    dextrose       Scheduled Medications:    oxybutynin  5 mg Oral 4x Daily    vitamin D  1,000 Units Oral Lunch    vitamin D  50,000 Units Oral Weekly    insulin lispro  10 Units Subcutaneous TID     budesonide  0.5 mg Nebulization BID    predniSONE  10 mg Oral Daily    Followed by   Chelsey Boss ON 5/30/2019] predniSONE  5 mg Oral Daily    midazolam  4 mg Intravenous Once    fentanNYL  100 mcg Intravenous Once    sodium chloride bacteriostatic  30 mL Injection Once    insulin glargine  15 Units Subcutaneous BID    azithromycin  500 mg Oral Daily    lisinopril  5 mg Oral Daily    insulin lispro  0-12 Units Subcutaneous TID     insulin lispro  0-6 Units Subcutaneous Nightly    guaiFENesin  600 mg Oral BID    sodium chloride flush  10 mL Intravenous 2 times per day    enoxaparin  40 mg Subcutaneous Daily    aspirin  81 mg Oral Daily    atorvastatin  40 mg Oral Nightly     PRN Meds: ipratropium-albuterol, acetaminophen, lidocaine viscous hcl, glucose, dextrose, glucagon (rDNA), dextrose, sodium chloride flush, magnesium hydroxide, ondansetron, labetalol    Labs:   No results for input(s): WBC, HGB, HCT, PLT in the last 72 hours. No results for input(s): NA, K, CL, CO2, BUN, CREATININE, CALCIUM, PHOS in the last 72 hours. Invalid input(s): MAGNES  No results for input(s): AST, ALT, BILIDIR, BILITOT, ALKPHOS in the last 72 hours. No results for input(s): INR in the last 72 hours. No results for input(s): Eward Pong in the last 72 hours.     Urinalysis:   Lab Results   Component Value Date    NITRU POSITIVE 05/17/2019    WBCUA  05/17/2019    BACTERIA MODERATE 05/17/2019    RBCUA 3-5 05/17/2019    BLOODU Negative 05/17/2019    SPECGRAV 1.038 05/17/2019    GLUCOSEU 250 05/17/2019       Radiology:   Most recent    Chest CT      WITH CONTRAST:No results found for this or any previous visit. WITHOUT CONTRAST: No results found for this or any previous visit. CXR      2-view:   Results for orders placed during the hospital encounter of 05/17/19   XR CHEST STANDARD (2 VW)    Narrative EXAMINATION: XR CHEST (2 VW)    CLINICAL HISTORY: COUGH    COMPARISONS: May 17, 2019. FINDINGS:    Osseous structures intact. Cardiopericardial silhouette normal. Pulmonary vasculature normal. Lungs clear. Impression No acute cardiopulmonary disease. Portable:   Results for orders placed during the hospital encounter of 05/17/19   XR CHEST PORTABLE    Narrative EXAMINATION: XR CHEST PORTABLE    CLINICAL HISTORY:   LOSS OF USE OF RIGHT LEG     COMPARISONS: NOVEMBER 24, 2013    FINDINGS: Osseous structures intact. Cardiopericardial silhouette normal. Pulmonary vasculature normal. Lungs clear. Impression NO ACUTE CARDIOPULMONARY DISEASE. Echo No results found for this or any previous visit. Assessment/Plan:    RLE weakness  MRI brain no acute process, T2 hyperintense focus periventricular similar to 1/22/11  Carotid US <50% internal bilat, bilat plaque burden at carotid bifurcation,  TTE showed IAS aneurysm, tech difficulty bubble study  LEONID  negative  ASA 81mg daily  Pt with continued RLE weakness and new urinary retention. MRI lumbar spine shows right foraminal stenosis and compression on exiting L4 nerve root   NSGY consult  Suspicious ALEXANDER CVA not seen on MRI given clinical findings of complete weakness of RLE  In addition pt has L4 radiculopathy which can be addressed as outpatient  Pt with MRI cervical spine 2017 at [facility] that showed severe canal stenosis and significant spondylosis with severe narrowing. MRI also showed multifocal areas of T2 abnormalities concerning for demyelinating disease suspicious for MS.  Will repeat MRI cervical spine and thoracic spine     MRI seen and reviewed, low suggestion of demyelination disease, Cervical

## 2019-05-28 NOTE — PROGRESS NOTES
mg Oral Daily Troy J Holiday, DO   5 mg at 05/28/19 0851    glucose (GLUTOSE) 40 % oral gel 15 g  15 g Oral PRN Cha [place] Sedar, DO        dextrose 50 % solution 12.5 g  12.5 g Intravenous PRN Cha [place] Sedar, DO        glucagon (rDNA) injection 1 mg  1 mg Intramuscular PRN Cha [place] Sedar, DO        dextrose 5 % solution  100 mL/hr Intravenous PRN Cha [place] Sedar, DO        insulin lispro (HUMALOG) injection vial 0-12 Units  0-12 Units Subcutaneous TID WC Cha [place] Sedar, DO   2 Units at 05/27/19 1646    insulin lispro (HUMALOG) injection vial 0-6 Units  0-6 Units Subcutaneous Nightly Cha [place] Sedar, DO   3 Units at 05/26/19 2050    guaiFENesin [facility]) extended release tablet 600 mg  600 mg Oral BID Cha [place] Sedar, DO   600 mg at 05/28/19 0851    sodium chloride flush 0.9 % injection 10 mL  10 mL Intravenous 2 times per day Romy Easton D Sedar, DO   10 mL at 05/27/19 0946    sodium chloride flush 0.9 % injection 10 mL  10 mL Intravenous PRN Cha [place] Sedar, DO        magnesium hydroxide (MILK OF MAGNESIA) 400 MG/5ML suspension 30 mL  30 mL Oral Daily PRN Cha [place] Sedar, DO        ondansetron [facility]) injection 4 mg  4 mg Intravenous Q6H PRN Cha [place] Sedar, DO        enoxaparin (LOVENOX) injection 40 mg  40 mg Subcutaneous Daily Romy Easton D Sedar, DO   40 mg at 05/28/19 4440    labetalol (NORMODYNE;TRANDATE) injection syringe 10 mg  10 mg Intravenous Q10 Min PRN Cha [place] Sedar, DO        aspirin EC tablet 81 mg  81 mg Oral Daily Cha [place] Sedar, DO   81 mg at 05/28/19 1984    atorvastatin (LIPITOR) tablet 40 mg  40 mg Oral Nightly Cha [place] Sedar, DO   40 mg at 05/27/19 1958     No Known Allergies  Active Problems:    Cerebrovascular accident (CVA) due to occlusion of small artery    TIA (transient ischemic attack)    Mild intermittent asthma with exacerbation    Shortness of breath    Cerebrovascular accident (CVA) determined by clinical assessment likely ALEXANDER with Right kip (HCC)    Urinary incontinence    Right leg weakness    Type 2 diabetes mellitus with hyperglycemia, with long-term current use of insulin (HCC)    Carotid artery plaque, left    Cervical radicular pain    Cervical spinal stenosis    Cervical spondylosis with myelopathy    Orthostatic hypotension    Lumbar and sacral osteoarthritis    Type 2 diabetes mellitus without complication, with long-term current use of insulin (HCC)  Resolved Problems:    Cervical spondylosis without myelopathy    Blood pressure (!) 146/73, pulse 75, temperature 97.9 °F (36.6 °C), temperature source Oral, resp. rate 18, height 5' 3\" (1.6 m), weight 195 lb (88.5 kg), SpO2 97 %. Subjective:  Symptoms:  No shortness of breath, malaise, cough, chest pain, weakness, headache, chest pressure, anorexia, diarrhea or anxiety. Diet:  No nausea or vomiting. Objective:  General Appearance:  Well-appearing, in no acute distress and comfortable. Vital signs: (most recent): Blood pressure (!) 146/73, pulse 75, temperature 97.9 °F (36.6 °C), temperature source Oral, resp. rate 18, height 5' 3\" (1.6 m), weight 195 lb (88.5 kg), SpO2 97 %. HEENT: Normal HEENT exam.    Lungs:  (+ wheezing, decrease BS)  Heart: S1 normal and S2 normal.    Abdomen: Abdomen is soft. Bowel sounds are normal.   There is no epigastric area or suprapubic area tenderness. Pulses: Distal pulses are intact. Neurological: Patient is alert and oriented to person, place and time. Pupils:  Pupils are equal, round, and reactive to light. Skin:  Warm and dry.       Lab Results   Component Value Date    WBC 9.8 05/23/2019    HGB 12.9 05/23/2019    HCT 38.0 05/23/2019    MCV 89.2 05/23/2019     05/23/2019     Lab Results   Component Value Date     05/23/2019    K 3.9 05/23/2019     05/23/2019    CO2 25 05/23/2019    BUN 17 05/23/2019    CREATININE 0.53 05/23/2019    GLUCOSE 283 05/23/2019    CALCIUM 9.3 05/23/2019        Assessment & Plan  1)  copd exacerbation  Resolved    2) RLE weakness better, TI A ruled out vs

## 2019-05-28 NOTE — PROGRESS NOTES
INPATIENT PROGRESS NOTES    PATIENT NAME: Stevenson Weston  MRN: [id number]  SERVICE DATE:  May 28, 2019   SERVICE TIME:  3:42 PM      PRIMARY SERVICE: Pulmonary Disease    CHIEF COMPLAIN:Shortness of breath      INTERVAL HPI: Patient seen and examined at bedside, Interval Notes, orders reviewed. Nursing notes noted    Is not having any short of breath or chest pain. Wheezing has improved. She also has significant improvement in leg weakness. No cough, No sputum. No chest pain or pleuritic pain . No fever or chills . No hemoptysis  No Nausea, Vomiting or Diarrhea. OBJECTIVE    Body mass index is 34.54 kg/m². PHYSICAL EXAM:  Vitals:  BP (!) 146/73   Pulse 75   Temp 97.9 °F (36.6 °C) (Oral)   Resp 18   Ht 5' 3\" (1.6 m)   Wt 195 lb (88.5 kg)   LMP  (LMP Unknown)   SpO2 97%   BMI 34.54 kg/m²   General: Alert, awake . comfortable in bed, No distress. appears stated age and cooperative  Head: Atraumatic , Normocephalic   Eyes: PERRL. No sclera icterus. No conjunctival injection. No discharge   ENT: No nasal  discharge. Pharynx clear. lips, teeth, mucosa and gums are normal, tongue protrudes in the midline  Neck:  Trachea midline. No thyromegaly, no JVD, No cervical adenopathy. Chest : Bilaterally symmetrical ,Normal effort,  No accessory muscle use  Lung : . Fair BS bilateral, decreased BS at bases. No Rales. No wheezing. No rhonchi. No dullness on percussion. Heart[de-identified] Normal  rate. Regular rhythm. No mumur ,  Rub or gallop  ABD: Non-tender. Non-distended. No masses. No organmegaly. Normal bowel sounds. No hernia. Ext : No Pitting both leg , No Cyanosis No clubbing, rt. Leg weakness          DATA:   No results for input(s): WBC, HGB, HCT, MCV, PLT in the last 72 hours. No results for input(s): NA, K, CL, CO2, BUN, CREATININE, GLUCOSE, CALCIUM, PROT, LABALBU, BILITOT, ALKPHOS, AST, ALT, LABGLOM, GFRAA, AGRATIO, GLOB in the last 72 hours.     MV Settings:          No results for input(s): PHART, BSU5PFD, PO2ART, CEC3SFW, BEART, J6FCARRF in the last 72 hours. O2 Device: None (Room air)  O2 Flow Rate (L/min): 2 L/min    DIET CARDIAC; Carb Control: 3 carb choices (45 gms)/meal     MEDICATIONS during current hospitalization:    Continuous Infusions:   dextrose         Scheduled Meds:   oxybutynin  5 mg Oral 4x Daily    vitamin D  1,000 Units Oral Lunch    vitamin D  50,000 Units Oral Weekly    insulin lispro  10 Units Subcutaneous TID     budesonide  0.5 mg Nebulization BID    predniSONE  10 mg Oral Daily    Followed by   Dmitriy Mcintosh ON 5/30/2019] predniSONE  5 mg Oral Daily    midazolam  4 mg Intravenous Once    fentanNYL  100 mcg Intravenous Once    sodium chloride bacteriostatic  30 mL Injection Once    insulin glargine  15 Units Subcutaneous BID    azithromycin  500 mg Oral Daily    lisinopril  5 mg Oral Daily    insulin lispro  0-12 Units Subcutaneous TID WC    insulin lispro  0-6 Units Subcutaneous Nightly    guaiFENesin  600 mg Oral BID    sodium chloride flush  10 mL Intravenous 2 times per day    enoxaparin  40 mg Subcutaneous Daily    aspirin  81 mg Oral Daily    atorvastatin  40 mg Oral Nightly       PRN Meds:ipratropium-albuterol, acetaminophen, lidocaine viscous hcl, glucose, dextrose, glucagon (rDNA), dextrose, sodium chloride flush, magnesium hydroxide, ondansetron, labetalol    Radiology  Cta Head W Wo Contrast    Result Date: 5/18/2019  CTA head with intravenous contrast medium. HISTORY: Inability to use left leg. Technical factors: CTA head imaging formatted as contiguous axial images through skull base. Sagittal, coronal, right and left oblique, 3-D MIP obtained during postprocessing. Intravenous contrast medium consisting of Isovue-370, 100 mL utilized. Study done in conjunction with CTA neck reported separately. No prior CT head available for comparison. FINDINGS: Bilateral A1 and A2 segments, anterior carotid arteries patent. Anterior communicating artery patent.  Communicating normal. Lungs clear. NO ACUTE CARDIOPULMONARY DISEASE. Mri Brain Wo Contrast    Result Date: 5/18/2019  EXAMINATION:  MRI BRAIN WITHOUT IV CONTRAST CLINICAL HISTORY:  RIGHT LOWER EXTREMITY WEAKNESS, EVALUATE FOR POSSIBLE TIA OR CVA COMPARISON:  5/17/2019 CT BRAIN TECHNIQUE:  Multisequence and multiplane MRI brain is obtained without IV gadolinium. FINDINGS:  There is cerebral atrophy and age related findings in the brain. There is relatively extensive patchy T2 hyperintense foci in the periventricular white matter of both cerebral hemispheres somewhat similar to the 1/22/2011 MRI brain. Differential considerations include a manifestation of small vessel disease or microangiopathy and demyelinating disease. There is no restricted diffusion or MRI evidence of an acute or subacute cerebral infarction. No abnormal paramagnetic signal in the  brain. There is no intracranial mass, hemorrhage, \"mass effect\" or midline shift. There is mucosal thickening in the maxillary and ethmoid sinuses suggesting acute or chronic sinusitis. Mastoids appear clear. 1. CEREBRAL ATROPHY AND AGE RELATED FINDINGS IN THE BRAIN. 2. NO RESTRICTED DIFFUSION OR MRI EVIDENCE OF AN ACUTE OR SUBACUTE CEREBRAL INFARCTION. 3. RELATIVELY EXTENSIVE PATCHY T2 HYPERINTENSE FOCI IN PERIVENTRICULAR WHITE MATTER OF BOTH CEREBRAL HEMISPHERES SIMILAR TO THE 1/22/2011 MRI BRAIN. DIFFERENTIAL CONSIDERATIONS INCLUDE A MANIFESTATION OF SMALL VESSEL DISEASE OR MICROANGIOPATHY AND DEMYELINATING DISEASE. 4. NO INTRACRANIAL MASS, HEMORRHAGE, \"MASS EFFECT\" OR MIDLINE SHIFT. IMPRESSION AND SUGGESTION:    1. Asthma with mild exacerbation, improving   2. Shortness of breath secondary to above  3. Rt. Leg weakness   4. Diabetes mellitus  5. Hypertension    She is on Nebulizers which DuoNeb 4 times a day and albuterol every 2 hours when necessary. . Pulmicort 0.5 mg twice a day. Mucinex 600 mg twice a day. on prednisone .   Follow-up in office 2 weeks PFT as outpatient.     Electronically signed by Shea Carrillo MD, FCCP on 5/28/2019 at 3:42 PM

## 2019-05-29 NOTE — PROGRESS NOTES
Physical Therapy  Facility/Department: [facility] MED SURG [id number]/[id number]  Physical Therapy Discharge      NAME: Josie Eubanks    : 1950 (79 y.o.)  MRN: [id number]    Account: [de-identified]  Gender: female      Patient has been discharged from acute care hospital. DC patient from current PT program.      Electronically signed by Anais Zaragoza PT on 19 at 4:47 PM

## 2019-05-29 NOTE — CONSULTS
[address]                      [address], [address] [facility]                                  CONSULTATION    PATIENT NAME: Landon Gutiérrez                      :        1950  MED REC NO:   [id number]                            ROOM:       [id number]  ACCOUNT NO:   [de-identified]                           ADMIT DATE: 2019  PROVIDER:     Shawna Donald MD    CONSULT DATE:  2019    REASON FOR CONSULT:  Management of uncontrolled diabetes. CHIEF COMPLAINT AND HISTORY OF PRESENT ILLNESS:  The patient is a  61-year-old female with known history of type 2 diabetes, on oral  medications; history of hypertension, and asthma, presenting with right  lower leg weakness. The patient was sleeping , taking a nap and when  she woke up, she was unable to move her right lower extremity with  weakness. Home medications included metformin 1000 mg twice a day and  it is unsure whether she was taking another medication for diabetes. A1c was very high at 10. Prior A1c in  was 11.6. The patient did  complain of increased urination and thirst.  Denies any blurred vision. She was started here on Lantus 15 units twice a day, Humalog 10 with  each meals. The patient also is on tapered prednisone while she was  here. Last blood sugar was 155. Prior blood sugars have been between  90s to 160 range. Chemistries were reviewed from , sodium 138,  potassium 3.9, chloride was 104, CO2 was 25, BUN was 17, creatinine  0.53, WBC count was 9.8, and hemoglobin was 12.9. The patient has been  seen here by neurologist and Neurosurgery. Neurology consult was done  on . Impression was TIA. Neurosurgery consult was done today for  possible spinal stenosis. Recommendation is to follow up as outpatient. The patient was also seen by Cardiology and Pulmonary. Cardiology for  LEONID. Pulmonary for asthma and COPD.     PAST MEDICAL HISTORY:  Type 2 diabetes, history of asthma,  osteoarthritis, _____ hypertension, and whooping cough. PAST SURGICAL HISTORY:  Tonsillectomy, hysterectomy, and appendectomy. FAMILY HISTORY:  Cancer. PERSONAL SOCIAL HISTORY:  Former smoker. Denies any alcohol. CURRENT MEDICATIONS:  Meds here included Lantus 15 units twice a day,  Humalog 10 with each meals, Lipitor, Zithromax, Pulmicort, Lovenox,  fentanyl, Mucinex, Zestril, Versed, Ditropan, prednisone 10 to be  tapered, and vitamin D. ALLERGIES:  None. REVIEW OF SYSTEMS:  Other than right lower weakness, increased  urination, and thirst, 14-point review of system was negative. PHYSICAL EXAMINATION:  GENERAL:  On examination, the patient is alert, awake, in no obvious  distress. VITAL SIGNS:  Blood pressure was 155/88, pulse rate was 93, respiratory  rate 18, temperature 97.7. NECK:  Supple. No goiter or thyromegaly was noted. CHEST:  Lungs were showing bilateral wheezing, reduced air entry. CARDIOVASCULAR:  Heart sounds were normal.  ABDOMEN:  Soft, moderately obese. EXTREMITIES:  Reveal 1+ pitting edema in both lower legs. LABS:  As above. ASSESSMENT:  Uncontrolled diabetes, inadequate poor compliance, TIA, and  obesity. PLAN:  We would lower the dose of Lantus to 15 at night and Humalog 4  with each meals. The patient is to follow up as an outpatient for  further management of her diabetes. We would restart her metformin  later on. Long-term A1c goal of 7 or lower. Blood sugar goals here  between 140 to 200 range. The patient is to follow up as she is  discharged from nursing home. She is going to be transferred to skilled  nursing facility today. Thank you for the consult.         Shirley Shields MD    D: 05/28/2019 18:28:45       T: 05/28/2019 22:22:09     [id number]  Job#: [id number]     Doc#: [id number]    CC:

## 2019-06-03 ENCOUNTER — OFFICE VISIT (OUTPATIENT)
Dept: GERIATRIC MEDICINE | Age: 69
End: 2019-06-03
Payer: MEDICARE

## 2019-06-03 VITALS
RESPIRATION RATE: 14 BRPM | HEIGHT: 63 IN | TEMPERATURE: 97.4 F | SYSTOLIC BLOOD PRESSURE: 137 MMHG | HEART RATE: 88 BPM | DIASTOLIC BLOOD PRESSURE: 87 MMHG | WEIGHT: 194.2 LBS | BODY MASS INDEX: 34.41 KG/M2

## 2019-06-03 DIAGNOSIS — I67.2 CEREBRAL ATHEROSCLEROSIS: ICD-10-CM

## 2019-06-03 DIAGNOSIS — I10 ESSENTIAL HYPERTENSION: ICD-10-CM

## 2019-06-03 DIAGNOSIS — J44.9 CHRONIC OBSTRUCTIVE PULMONARY DISEASE, UNSPECIFIED COPD TYPE (HCC): ICD-10-CM

## 2019-06-03 DIAGNOSIS — G83.10 PARESIS OF LOWER EXTREMITY (HCC): Primary | ICD-10-CM

## 2019-06-03 PROCEDURE — 1123F ACP DISCUSS/DSCN MKR DOCD: CPT | Performed by: INTERNAL MEDICINE

## 2019-06-03 PROCEDURE — 3046F HEMOGLOBIN A1C LEVEL >9.0%: CPT | Performed by: INTERNAL MEDICINE

## 2019-06-03 PROCEDURE — 99305 1ST NF CARE MODERATE MDM 35: CPT | Performed by: INTERNAL MEDICINE

## 2019-06-08 ASSESSMENT — ENCOUNTER SYMPTOMS
BLOOD IN STOOL: 0
WHEEZING: 0
VOICE CHANGE: 0
PHOTOPHOBIA: 0
TROUBLE SWALLOWING: 0
SHORTNESS OF BREATH: 0
COUGH: 0
ABDOMINAL DISTENTION: 0
CHOKING: 0
BACK PAIN: 1
ABDOMINAL PAIN: 0

## 2019-06-08 NOTE — PROGRESS NOTES
Vi Guzman is a 79 y.o. female former smoker, with diabetes, COPD, HTN, cervical spinal stenosis, lumbar spondylosis,  cerebrovascular disease, obesity, admitted on 05/28/2019 to [facility] for physical rehabilitation for sudden onset of right leg weakness. The patient was interviewed and examined in her room at the nursing facility. Chief Complaint   Patient presents with    Extremity Weakness       HPI     The patient presented to [facility] on  from home on 05/17/2019 with sudden onset of RLE weakness with no trauma. She stated that she took a nap around 2pm, woke up around 530 with RLE weakness. She was not able to walk. No visual changes. No sensory deficit. She was not able to walk around her house so she called her brother to bring her to ED. on arrival to the ER, the symptoms were improved. TIA workup completed with brain MRI, CTA of head and neck, carotid doppler. TTE showed intra-atrial septal aneurysm. The neurologist diagnosed TIA, in the setting of longstanding cervical stenosis and cerebrovascular disease. The patient was admitted to [facility]. Acute CVA was ruled out. She had also ongoing COPD exacerbation and Klebsiella UTI which were treated per usual protocol. The patient's leg weakness improved with steroids but continued to limit ambulation, and she was admitted on 05/23/2019 to inpatient rehabilitation, followed by transfer to the skilled nursing facility.        More detail above in the chiefcomplaint(s), interim history and below in the review of systems.      Past Medical History:   Diagnosis Date    Asthma, mild persistent     Bladder infection     Carotid artery plaque, left 2019    moderate    Cerebral atherosclerosis 2011    small vessel disease    Cerebrovascular disease, unspecified 2017    MRI, cannot r/o MS, lumbar tap +ve    Cervical spinal stenosis     Chronic back pain     [facility]) [facility] [address]     History of pneumonia     Hypertension     Lumbar spondylosis     Obesity (BMI 30.0-34. 9)     Osteoarthritis     Uncontrolled diabetes mellitus type 2 without complications ([facility] [address]        Past Surgical History:   Procedure Laterality Date    ANKLE FRACTURE SURGERY Left     APPENDECTOMY  1978    HYSTERECTOMY  1983    TONSILLECTOMY  1966       Social History     Socioeconomic History    Marital status:      Spouse name: Not on file    Number of children: Not on file    Years of education: Not on file    Highest education level: Not on file   Occupational History    Occupation: retired   Social Needs    Financial resource strain: Not on file    Food insecurity:     Worry: Not on file     Inability: Not on file   [employer] needs:     Medical: Not on file     Non-medical: Not on file   Tobacco Use    Smoking status: Former Smoker     Last attempt to quit: 10/19/2009     Years since quittin.6    Smokeless tobacco: Never Used   Substance and Sexual Activity    Alcohol use: No     Comment: recovering alcoholic, 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 2019    Drug use: Never    Sexual activity: Not on file   Lifestyle    Physical activity:     Days per week: Not on file     Minutes per session: Not on file    Stress: Not on file   Relationships    Social connections:     Talks on phone: Not on file     Gets together: Not on file     Attends Yazidi service: Not on file     Active member of club or organization: Not on file     Attends meetings of clubs or organizations: Not on file     Relationship status: Not on file    Intimate partner violence:     Fear of current or ex partner: Not on file     Emotionally abused: Not on file     Physically abused: Not on file     Forced sexual activity: Not on file   Other Topics Concern    Not on file   Social History Narrative    , 3 children (son incarcerated 2019, daughter in [place] and daughter in [place]     5 brothers and 2 sisters, very good family support    Worked in factories        Lives With: Alone, [place]    Type of Home: Apartment: One level    Home Access: Level entry    Bathroom Shower/Tub: Tub/Shower unit    [employer]: Shower chair    Home Equipment: Rolling walker, Oxygen(pt unsure of L o2 at home )    ADL Assistance: Independent    Homemaking Assistance: Needs assistance(pt reported dtr assist with cleaning )    Ambulation Assistance: Independent    Transfer Assistance: Independent    Active : No(dtr drives )       Family History   Problem Relation Age of Onset    Cancer Mother     Diabetes type 2  Mother     Cancer Father        Family and socialhistory were reviewed and pertinent changes documented. ALLERGIES    Patient has no known allergies.     Current Outpatient Medications on File Prior to Visit   Medication Sig Dispense Refill    budesonide (PULMICORT) 0.5 MG/2ML nebulizer suspension Take 2 mLs by nebulization 2 times daily 60 ampule 3    ipratropium-albuterol (DUONEB) 0.5-2.5 (3) MG/3ML SOLN nebulizer solution Inhale 3 mLs into the lungs every 4 hours as needed for Shortness of Breath 360 mL 0    aspirin 81 MG EC tablet Take 1 tablet by mouth daily 30 tablet 3    atorvastatin (LIPITOR) 40 MG tablet Take 1 tablet by mouth nightly 30 tablet 3    lisinopril (PRINIVIL;ZESTRIL) 5 MG tablet Take 1 tablet by mouth daily 30 tablet 3    oxybutynin (DITROPAN) 5 MG tablet Take 1 tablet by mouth 4 times daily 90 tablet 3    vitamin D (CHOLECALCIFEROL) 1000 UNIT TABS tablet Take 1 tablet by mouth Daily with lunch 60 tablet 0    vitamin D (ERGOCALCIFEROL) 70025 units capsule Take 1 capsule by mouth once a week 5 capsule 0    insulin lispro (HUMALOG) 100 UNIT/ML injection vial Inject 4 Units into the skin 3 times daily (with meals) (Patient taking differently: Inject 5 Units into the skin 3 times daily (with meals) ) 1 vial 3    insulin glargine (LANTUS) 100 UNIT/ML injection vial Inject 15 Units into the skin nightly (Patient taking differently: Inject 18 Units into the skin nightly ) 1 vial 3    albuterol sulfate  (90 Base) MCG/ACT inhaler Inhale 2 puffs into the lungs every 6 hours as needed for Wheezing       No current facility-administered medications on file prior to visit. Review of Systems   Constitutional: Negative for appetite change, chills, fatigue, fever and unexpected weight change. HENT: Negative for congestion, nosebleeds, trouble swallowing and voice change. Eyes: Negative for photophobia and visual disturbance. Respiratory: Negative for cough, choking, shortness of breath and wheezing. Cardiovascular: Positive for leg swelling. Negative for chest pain and palpitations. Gastrointestinal: Negative for abdominal distention, abdominal pain and blood in stool. Endocrine: Negative for cold intolerance, heat intolerance, polydipsia and polyuria. Genitourinary: Positive for frequency. Negative for dysuria, flank pain, hematuria and urgency. Musculoskeletal: Positive for arthralgias, back pain, gait problem and neck stiffness. Negative for joint swelling and neck pain. Skin: Negative for wound. Neurological: Positive for weakness (right leg). Negative for dizziness, tremors, seizures, syncope, speech difficulty, light-headedness and headaches. Hematological: Does not bruise/bleed easily. Psychiatric/Behavioral: Negative for confusion, decreased concentration, dysphoric mood and sleep disturbance. The patient is not nervous/anxious. Vitals:    06/03/19 1407   BP: 137/87   Pulse: 88   Resp: 14   Temp: 97.4 °F (36.3 °C)   Weight: 194 lb 3.2 oz (88.1 kg)   Height: 5' 3\" (1.6 m)       Physical Exam   Constitutional: She is oriented to person, place, and time. No distress. Obese, age appropriate, well groomed  Chronically ill-appearing   HENT:   Head: Normocephalic and atraumatic. Eyes: EOM are normal.   Eye exam reveals bilateral myosis and arcus senilis. Neck: Normal range of motion. Neck supple. No JVD present. Cardiovascular: Regular rhythm and normal heart sounds. Occasional extrasystoles are present. Exam reveals no gallop. Pulses:       Carotid pulses are 2+ on the right side, and 2+ on the left side. Radial pulses are 2+ on the right side, and 2+ on the left side. Pulmonary/Chest: Effort normal. No respiratory distress. She has no wheezes. She has rales (intermittent, scattered, small rhonchi, clear with cough). Diminished breath sounds bilaterally     Abdominal: Soft. She exhibits no distension. There is no tenderness. Exam limited due to abdominal adiposity      Neurological: She is alert and oriented to person, place, and time. She displays no atrophy and no tremor. No cranial nerve deficit or sensory deficit. Coordination normal.   There is right lower extremity weakness more pronounced distally (very weak foot dorsiflexion), with significant impairment of the ability to walk. The patient assists with walker. Skin: Skin is warm and dry. No rash noted. Psychiatric: Her speech is normal. Her mood appears not anxious. Her affect is not labile. She is slowed and withdrawn. Thought content is not delusional. She does not express inappropriate judgment. She does not exhibit a depressed mood. She exhibits normal recent memory and normal remote memory. Assessment:    Fran Serrano was seen today for extremity weakness. Diagnoses and all orders for this visit:    Paresis of lower extremity ([address]              With underlying cervical spinal stenosis, possible MS (+ve lumbar tap in 2017), possible TIA, diabetes mellitus, atrial septal aneurysm. Continue PT/OT, ASA, all current medications. Follow up with neurology, cardiology, as outpatient.        Cerebral atherosclerosis               Continue and optimize diabetes and hypertension management      Uncontrolled diabetes mellitus type 2 without complications ([address]               Per orders, continue glucose monitoring, focus on lifestyle, monitor A1C, close outpatient follow up      Chronic obstructive pulmonary disease, unspecified COPD type ([facility] [address]                Acute exacerbation resolved, continue maintenance treatment      Essential hypertension                Stable on the current medication        Plan:    Reviewed with the patient (/and caregiver if present): current health status, medications, activities and diet. See also orders and comments in the assessment section. Today's diagnosis (in the context ofchronic problems) was discussed with patient (/and caregiver if present), questions answered. Close outpatient follow up with PCP, cardiology, neurology, needed to evaluate treatment results and for care coordination. I have reviewed the patient's medical and surgical, family and social history, health maintenance schedule, and updated the computerized patient record. Please note this report has been partially produced by using speech recognition hardware. It may contain errors related to the system, including grammar, punctuation and spelling as well as words and phrases that may seem inaccurate. For anyquestions or concerns, please feel free to contact me for clarification.         Electronically signed by Sadie Palafox MD

## 2019-06-10 ENCOUNTER — OFFICE VISIT (OUTPATIENT)
Dept: GERIATRIC MEDICINE | Age: 69
End: 2019-06-10
Payer: MEDICARE

## 2019-06-10 DIAGNOSIS — I63.81 CEREBROVASCULAR ACCIDENT (CVA) DUE TO OCCLUSION OF SMALL ARTERY (HCC): ICD-10-CM

## 2019-06-10 DIAGNOSIS — N30.01 ACUTE CYSTITIS WITH HEMATURIA: ICD-10-CM

## 2019-06-10 DIAGNOSIS — J44.1 COPD EXACERBATION (HCC): ICD-10-CM

## 2019-06-10 DIAGNOSIS — R29.898 RIGHT LEG WEAKNESS: Primary | ICD-10-CM

## 2019-06-10 PROCEDURE — 1123F ACP DISCUSS/DSCN MKR DOCD: CPT | Performed by: NURSE PRACTITIONER

## 2019-06-10 PROCEDURE — 99309 SBSQ NF CARE MODERATE MDM 30: CPT | Performed by: NURSE PRACTITIONER

## 2019-06-21 NOTE — PROGRESS NOTES
PATIENT: Carrie Warren : 1950 DOS: 06/10/2019     [place] MARIAA AGUIAR    The patient is being seen for a follow up of her admission for PT, OT, rehabilitation. She has right leg weakness. She has COPD, hypertension, DM2. In 2017, she had had TIA, is noted in the old record that she had possible MS. The patient says she is not aware that she had MS, I did indicate to her it is noted. She says she is not aware that she has had anything since then. She said she had a UTI. I indicated to her that, that was noted, it was Klebsiella. She also has cerebrovascular disease and I asked her who her neurologist was and she is not aware. She says she has not had a neurologist. She also has cervical stenosis. Her A1c is 10%. She claims that she is following her medical recommendations for the DM2. Since she continues to have the right leg weakness and this does not appear to be a new onset, since the old records indicate she had the same thing in 2017 with probable MS, patient will need neurology follow up. She did agree to go. She is not having any issues she states with nausea, vomiting, decreased appetite, shortness of breath, fevers, chills, sweats. She does not have any dysuria. She is following physical therapy with occupational therapy plans. MEDICATIONS AND ALLERGIES: Reviewed in the nursing facility chart. Past Medical History:   Diagnosis Date    Asthma, mild persistent     Atrial septal aneurysm     per TTE    Bladder infection     Carotid artery plaque, left 2019    moderate    Cerebral atherosclerosis     small vessel disease    Cerebrovascular disease, unspecified     MRI, cannot r/o MS, lumbar tap +ve    Cervical spinal stenosis     Chronic back pain     COPD (chronic obstructive pulmonary disease) (HCC)     History of pneumonia     Hypertension     Lumbar spondylosis     Obesity (BMI 30.0-34. 9)     Osteoarthritis     Uncontrolled diabetes mellitus type 2 without complications ([address]        PHYSICAL EXAMINATION: Vital signs are stable. The patient is sitting upright in a wheelchair in the room. She is in no acute distress. She is awake, alert. She is oriented, conversational. Speech is fluent. She is following commands, appears to have normal thought content. Mucosa is moist, pink without thrush. EOMs are intact. Euthyroid. Heart has regular rate and rhythm. Lungs are clear to auscultation, even and unlabored. ABDOMEN: She is obese. Positive bowel sounds, nontender. Right leg weakness noted with weightbearing. She has trace edema bilaterally. Dorsal pedal pulses 1 to 2/4 bilaterally. ASSESSMENT AND PLAN:   1. Right leg weakness TIA versus MS. Neurology followup. 2.  Klebsiella UTI is resolved clinically. 3.  Acute COPD with exacerbation, resolved. The patient is having no respiratory issues. 4.  Cerebrovascular disease. The patient will follow up with neurology. 5.  DM 2 with a 10% A1c. The patient is obviously noncompliant. We will continue diet medication teaching. POC, medications, labs reviewed. Continue PT, OT, rehabilitation and we will follow.           Electronically Signed By: NARINDER Dawkins GNP-BC on 06/20/2019 09:24:06  ______________________________  NARINDER Dawkins GNP-BC  /[id number]  D: 06/19/2019 22:30:03  T: 06/19/2019 23:32:49    cc: - [address] Gar [address].

## 2019-06-24 ENCOUNTER — OFFICE VISIT (OUTPATIENT)
Dept: GERIATRIC MEDICINE | Age: 69
End: 2019-06-24
Payer: MEDICARE

## 2019-06-24 DIAGNOSIS — E11.65 TYPE 2 DIABETES MELLITUS WITH HYPERGLYCEMIA, WITH LONG-TERM CURRENT USE OF INSULIN (HCC): ICD-10-CM

## 2019-06-24 DIAGNOSIS — R29.898 RIGHT LEG WEAKNESS: Primary | ICD-10-CM

## 2019-06-24 DIAGNOSIS — Z79.4 TYPE 2 DIABETES MELLITUS WITH HYPERGLYCEMIA, WITH LONG-TERM CURRENT USE OF INSULIN (HCC): ICD-10-CM

## 2019-06-24 DIAGNOSIS — J44.9 CHRONIC OBSTRUCTIVE PULMONARY DISEASE, UNSPECIFIED COPD TYPE (HCC): ICD-10-CM

## 2019-06-24 DIAGNOSIS — I10 ESSENTIAL HYPERTENSION: ICD-10-CM

## 2019-06-24 PROCEDURE — 99316 NF DSCHRG MGMT 30 MIN+: CPT | Performed by: NURSE PRACTITIONER

## 2019-06-24 PROCEDURE — 3046F HEMOGLOBIN A1C LEVEL >9.0%: CPT | Performed by: NURSE PRACTITIONER

## 2019-07-03 VITALS
WEIGHT: 207.8 LBS | DIASTOLIC BLOOD PRESSURE: 81 MMHG | RESPIRATION RATE: 18 BRPM | BODY MASS INDEX: 36.81 KG/M2 | OXYGEN SATURATION: 98 % | HEART RATE: 97 BPM | TEMPERATURE: 97 F | SYSTOLIC BLOOD PRESSURE: 133 MMHG

## 2019-07-03 NOTE — PROGRESS NOTES
and pink. Nonicteric sclerae. Euthyroid. Heart: Regular rate and rhythm. S1, S2 is normal. No gallop. Lungs are clear to auscultation. Even, unlabored respirations. No wheezing, no rhonchi. Abdomen: Obese, soft, nontender, nondistended. No hepatomegaly. Lower extremities: She had trace edema in the ankles bilaterally. Right lower extremity leg weakness noted, improved so from admission. ASSESSMENT AND PLAN:   1. Right lower extremity weakness, TIA, possibly even MS. 2.  DM2, uncontrolled with A1c of 10 secondary to noncompliance. 3.  Uncontrolled hypertension. Her blood pressure was within normal limits at discharge. 4.  COPD, is stable. No exacerbations. POC, medications, labs. She will continue same medications. Followup appointments are made with her specialty physicians and she is to follow up in a week with her PCP. She will have home healthcare RN, PT, OT. Discharge > 30 minutes, plan of care with patient discussing or teaching for diabetes, also discharge planner and RN.           Electronically Signed By: NARINDER Vera GNP-BC on 07/03/2019 09:10:04  ______________________________  NARINDER Vera GNP-BC  /[id number]  D: 06/30/2019 21:37:16  T: 07/01/2019 00:49:09    cc: - [address]

## 2019-07-06 ENCOUNTER — HOSPITAL ENCOUNTER (EMERGENCY)
Age: 69
Discharge: HOME | End: 2019-07-06
Attending: FAMILY MEDICINE
Payer: MEDICARE

## 2019-07-06 ENCOUNTER — APPOINTMENT (OUTPATIENT)
Dept: GENERAL RADIOLOGY | Age: 69
End: 2019-07-06
Payer: MEDICARE

## 2019-07-06 ENCOUNTER — APPOINTMENT (OUTPATIENT)
Dept: CT IMAGING | Age: 69
End: 2019-07-06
Payer: MEDICARE

## 2019-07-06 VITALS
WEIGHT: 204 LBS | OXYGEN SATURATION: 97 % | HEART RATE: 106 BPM | BODY MASS INDEX: 36.14 KG/M2 | DIASTOLIC BLOOD PRESSURE: 63 MMHG | SYSTOLIC BLOOD PRESSURE: 136 MMHG | HEIGHT: 63 IN | TEMPERATURE: 98.3 F | RESPIRATION RATE: 19 BRPM

## 2019-07-06 DIAGNOSIS — M16.11 OSTEOARTHRITIS OF RIGHT HIP, UNSPECIFIED OSTEOARTHRITIS TYPE: Primary | ICD-10-CM

## 2019-07-06 DIAGNOSIS — M47.816 OSTEOARTHRITIS OF LUMBAR SPINE, UNSPECIFIED SPINAL OSTEOARTHRITIS COMPLICATION STATUS: ICD-10-CM

## 2019-07-06 PROCEDURE — 72131 CT LUMBAR SPINE W/O DYE: CPT

## 2019-07-06 PROCEDURE — 73503 X-RAY EXAM HIP UNI 4/> VIEWS: CPT

## 2019-07-06 PROCEDURE — 99283 EMERGENCY DEPT VISIT LOW MDM: CPT

## 2019-07-06 RX ORDER — MELOXICAM 15 MG/1
15 TABLET ORAL DAILY PRN
Qty: 30 TABLET | Refills: 1 | Status: SHIPPED | OUTPATIENT
Start: 2019-07-06 | End: 2019-08-27

## 2019-07-06 ASSESSMENT — PAIN DESCRIPTION - LOCATION: LOCATION: BUTTOCKS

## 2019-07-06 ASSESSMENT — PAIN DESCRIPTION - PAIN TYPE: TYPE: CHRONIC PAIN

## 2019-07-06 ASSESSMENT — PAIN DESCRIPTION - FREQUENCY: FREQUENCY: CONTINUOUS

## 2019-07-06 ASSESSMENT — PAIN DESCRIPTION - ORIENTATION: ORIENTATION: RIGHT

## 2019-07-06 ASSESSMENT — PAIN DESCRIPTION - DESCRIPTORS: DESCRIPTORS: ACHING

## 2019-07-06 ASSESSMENT — PAIN SCALES - GENERAL: PAINLEVEL_OUTOF10: 7

## 2019-07-06 NOTE — ED PROVIDER NOTES
[address] [facility] ED  eMERGENCY dEPARTMENT eNCOUnter      Pt Name: Thanh Wolff  MRN: [id number]  [place] 1950  Date of evaluation: 7/6/2019  Provider: Sabina Johnson MD    [address] [place] [address]       Chief Complaint   Patient presents with    Back Pain     Pain and stiffness of the right hip and lower back x2 hours         HISTORY OF PRESENT ILLNESS   (Location/Symptom, Timing/Onset,Context/Setting, Quality, Duration, Modifying Factors, Severity)  Note limiting factors. Thanh Wolff is a 79 y.o. female who presents to the emergency department     79years old with known osteoarthritis of the hip and lower back states was in her bathtub this morning and was trying to get out and she felt very stiff was unable to move her right leg due to severe pain in the lower back in the right. Patient had to call the squad so they can help her getting out of her bathtub    The history is provided by the patient. Back Pain   Location:  Lumbar spine  Quality:  Aching  Radiates to:  R thigh  Pain severity:  Moderate  Onset quality:  Sudden  Duration:  2 hours  Progression:  Partially resolved  Chronicity:  Recurrent  Context: not emotional stress, not falling, not jumping from heights, not lifting heavy objects, not MCA, not MVA, not occupational injury, not pedestrian accident, not physical stress, not recent illness, not recent injury and not twisting    Relieved by:  Nothing  Worsened by: Movement  Ineffective treatments:  None tried  Associated symptoms: leg pain    Associated symptoms: no abdominal pain, no abdominal swelling, no bladder incontinence, no bowel incontinence, no chest pain, no dysuria, no fever, no headaches, no numbness, no paresthesias, no pelvic pain, no perianal numbness, no tingling, no weakness and no weight loss        NursingNotes were reviewed. REVIEW OF SYSTEMS    (2-9 systems for level 4, 10 or more for level 5)     Review of Systems   Constitutional: Negative for fever and weight loss. Cardiovascular: Negative for chest pain. Gastrointestinal: Negative for abdominal pain and bowel incontinence. Genitourinary: Negative for bladder incontinence, dysuria and pelvic pain. Musculoskeletal: Positive for back pain. Neurological: Negative for tingling, weakness, numbness, headaches and paresthesias. All other systems reviewed and are negative. Except as noted above the remainder of the review of systems was reviewed and negative. PAST MEDICAL HISTORY     Past Medical History:   Diagnosis Date    Asthma, mild persistent     Atrial septal aneurysm 2019    per TTE    Bladder infection     Carotid artery plaque, left 2019    moderate    Cerebral atherosclerosis 2011    small vessel disease    Cerebrovascular disease, unspecified 2017    MRI, cannot r/o MS, lumbar tap +ve    Cervical spinal stenosis     Chronic back pain     [facility] obstructive pulmonary disease) ([facility])     History of pneumonia     Hypertension     Lumbar spondylosis     Obesity (BMI 30.0-34. 9)     Osteoarthritis     Uncontrolled diabetes mellitus type 2 without complications ([facility] [address]          SURGICALHISTORY       Past Surgical History:   Procedure Laterality Date    ANKLE FRACTURE SURGERY Left     APPENDECTOMY  1978    HYSTERECTOMY  1983   [address]         CURRENT MEDICATIONS       Discharge Medication List as of 7/6/2019  3:26 PM      CONTINUE these medications which have NOT CHANGED    Details   budesonide (PULMICORT) 0.5 MG/2ML nebulizer suspension Take 2 mLs by nebulization 2 times daily, Disp-60 ampule, R-3Normal      ipratropium-albuterol (DUONEB) 0.5-2.5 (3) MG/3ML SOLN nebulizer solution Inhale 3 mLs into the lungs every 4 hours as needed for Shortness of Breath, Disp-360 mL, R-0Normal      aspirin 81 MG EC tablet Take 1 tablet by mouth daily, Disp-30 tablet, R-3Normal      atorvastatin (LIPITOR) 40 MG tablet Take 1 tablet by mouth nightly, Disp-30 tablet, R-3Normal findings:      Interpretation per the Radiologist below, if available at the time ofthis note:    XR Hip Right Min 4Vw W Pelvis   Final Result      No acute osseous abnormality. CT LUMBAR SPINE WO CONTRAST   Final Result      Multilevel degenerative changes of the lumbar spine most significant at L4-5 and L5-S1. No acute fracture or malalignment. All CT scans at this facility use dose modulation, iterative reconstruction, and/or weight based dosing when appropriate to reduce radiation dose to as low as reasonably achievable. Severe osteoarthritis of the right hip    ED BEDSIDE ULTRASOUND:   Performed by ED Physician - none    LABS:  Labs Reviewed - No data to display    All other labs were within normal range or not returned as of this dictation. EMERGENCY DEPARTMENT COURSE and DIFFERENTIAL DIAGNOSIS/MDM:   Vitals:    Vitals:    07/06/19 1309   BP: 136/63   Pulse: 106   Resp: 19   Temp: 98.3 °F (36.8 °C)   SpO2: 97%   Weight: 204 lb (92.5 kg)   Height: 5' 3\" (1.6 m)       MDM  Number of Diagnoses or Management Options  Osteoarthritis of lumbar spine, unspecified spinal osteoarthritis complication status:   Osteoarthritis of right hip, unspecified osteoarthritis type:   Diagnosis management comments: With known history of diabetes hypertension also known osteoarthritis of the lower back and the hip presented to the ER after she was in her bathtub this morning get stiff and was unable to get up and had to call the squad.   On arrival to the ER patient was having some stiffness in the right hip and pain but pain have resolved after a few minutes before discharge she was able to get off the bed and ambulate with her walker with no problem with bearing weight on her right leg with no issues was discharged home with a prescription for Mobic and advised to follow-up with her primary care also CT  of the lower back and Xray of the right  hip showed no acute osseous abnormality      CONSULTS:  None    PROCEDURES:  Unless otherwise noted below, none     Procedures    FINAL IMPRESSION      1. Osteoarthritis of right hip, unspecified osteoarthritis type    2.  Osteoarthritis of lumbar spine, unspecified spinal osteoarthritis complication status          DISPOSITION/PLAN   DISPOSITION Decision To Discharge 07/06/2019 03:24:09 PM      PATIENT REFERRED TO:  Jai Davis MD  [facility] [address]     In 2 days        DISCHARGE MEDICATIONS:  Discharge Medication List as of 7/6/2019  3:26 PM      START taking these medications    Details   meloxicam (MOBIC) 15 MG tablet Take 1 tablet by mouth daily as needed for Pain, Disp-30 tablet, R-1Print                (Please note thatportions of this note were completed with a voice recognition program.  Efforts were made to edit the dictations but occasionally words are mis-transcribed.)    Mendy Servin MD (electronically signed)  Attending Emergency Physician  Leo Vieira MD  07/07/19 4500

## 2019-07-07 ASSESSMENT — ENCOUNTER SYMPTOMS
ABDOMINAL PAIN: 0
BOWEL INCONTINENCE: 0
BACK PAIN: 1
ABDOMINAL SWELLING: 0

## 2019-08-27 ENCOUNTER — OFFICE VISIT (OUTPATIENT)
Dept: ENDOCRINOLOGY | Age: 69
End: 2019-08-27
Payer: MEDICARE

## 2019-08-27 VITALS
HEART RATE: 99 BPM | BODY MASS INDEX: 36.14 KG/M2 | HEIGHT: 63 IN | SYSTOLIC BLOOD PRESSURE: 127 MMHG | DIASTOLIC BLOOD PRESSURE: 81 MMHG

## 2019-08-27 DIAGNOSIS — E11.9 TYPE 2 DIABETES MELLITUS WITHOUT COMPLICATION, WITH LONG-TERM CURRENT USE OF INSULIN (HCC): Primary | ICD-10-CM

## 2019-08-27 DIAGNOSIS — Z79.4 TYPE 2 DIABETES MELLITUS WITHOUT COMPLICATION, WITH LONG-TERM CURRENT USE OF INSULIN (HCC): Primary | ICD-10-CM

## 2019-08-27 DIAGNOSIS — I67.82 TEMPORARY CEREBRAL VASCULAR DYSFUNCTION: ICD-10-CM

## 2019-08-27 DIAGNOSIS — M21.371 RIGHT FOOT DROP: ICD-10-CM

## 2019-08-27 LAB
CHP ED QC CHECK: NORMAL
GLUCOSE BLD-MCNC: 146 MG/DL
HBA1C MFR BLD: 8.5 %

## 2019-08-27 PROCEDURE — 83036 HEMOGLOBIN GLYCOSYLATED A1C: CPT | Performed by: PHYSICIAN ASSISTANT

## 2019-08-27 PROCEDURE — 99214 OFFICE O/P EST MOD 30 MIN: CPT | Performed by: PHYSICIAN ASSISTANT

## 2019-08-27 PROCEDURE — 82962 GLUCOSE BLOOD TEST: CPT | Performed by: PHYSICIAN ASSISTANT

## 2019-08-27 RX ORDER — GLUCOSAMINE HCL/CHONDROITIN SU 500-400 MG
1 CAPSULE ORAL
Qty: 150 STRIP | Refills: 3 | Status: SHIPPED | OUTPATIENT
Start: 2019-08-27

## 2019-08-27 RX ORDER — FLASH GLUCOSE SENSOR
2 KIT MISCELLANEOUS
Qty: 2 EACH | Refills: 3 | Status: SHIPPED | OUTPATIENT
Start: 2019-08-27

## 2019-08-27 RX ORDER — FLASH GLUCOSE SCANNING READER
1 EACH MISCELLANEOUS
Qty: 1 DEVICE | Refills: 0 | Status: SHIPPED | OUTPATIENT
Start: 2019-08-27

## 2019-08-27 RX ORDER — HYDROCHLOROTHIAZIDE 12.5 MG/1
12.5 TABLET ORAL DAILY
COMMUNITY

## 2019-08-27 RX ORDER — ISOSORBIDE MONONITRATE 30 MG/1
30 TABLET, EXTENDED RELEASE ORAL DAILY
COMMUNITY

## 2019-08-27 RX ORDER — LANCETS 30 GAUGE
1 EACH MISCELLANEOUS
Qty: 150 EACH | Refills: 3 | Status: SHIPPED | OUTPATIENT
Start: 2019-08-27

## 2019-08-27 ASSESSMENT — ENCOUNTER SYMPTOMS
SINUS PRESSURE: 0
SHORTNESS OF BREATH: 0
ABDOMINAL DISTENTION: 1
EYE REDNESS: 0
BACK PAIN: 1
ABDOMINAL PAIN: 0
WHEEZING: 0
NAUSEA: 0
VOMITING: 0
COUGH: 0
EYE PAIN: 0
RHINORRHEA: 0
SORE THROAT: 0
DIARRHEA: 0

## 2019-08-27 NOTE — PROGRESS NOTES
Devices by Does not apply route every 14 days     Dispense:  2 each     Refill:  3    blood glucose monitor strips     Si strip by Other route 4 times daily (before meals and nightly)     Dispense:  150 strip     Refill:  3    blood glucose monitor kit and supplies     Si kit by Other route 4 times daily (before meals and nightly)     Dispense:  1 kit     Refill:  0    Lancets MISC     Si each by Does not apply route 4 times daily (before meals and nightly)     Dispense:  150 each     Refill:  3    insulin glargine (LANTUS SOLOSTAR) 100 UNIT/ML injection pen     Sig: Inject 15 Units into the skin nightly     Dispense:  5 pen     Refill:  3    insulin lispro (HUMALOG KWIKPEN) 100 UNIT/ML pen     Sig: Inject 5 Units into the skin 3 times daily (before meals)     Dispense:  5 pen     Refill:  3     Return in about 4 weeks (around 2019) for Diabetes. Subjective:     Chief Complaint   Patient presents with    Diabetes     Vitals:    19 1124   BP: 127/81   Site: Right Upper Arm   Position: Sitting   Cuff Size: Large Adult   Pulse: 99   Height: 5' 3\" (1.6 m)     Wt Readings from Last 3 Encounters:   19 204 lb (92.5 kg)   19 207 lb 12.8 oz (94.3 kg)   19 194 lb 3.2 oz (88.1 kg)     BP Readings from Last 3 Encounters:   19 127/81   19 136/63   19 133/81     Diabetes   She presents for her follow-up diabetic visit. She has type 2 diabetes mellitus. The initial diagnosis of diabetes was made 20 years ago. Her disease course has been improving. Hypoglycemia symptoms include confusion, sweats and tremors. Pertinent negatives for hypoglycemia include no dizziness or headaches. Associated symptoms include fatigue and weakness. Pertinent negatives for diabetes include no chest pain, no polydipsia and no polyuria. There are no hypoglycemic complications. Symptoms are stable. Diabetic complications include a CVA and peripheral neuropathy.  Risk factors for coronary Rfl:     hydrochlorothiazide (HYDRODIURIL) 12.5 MG tablet, Take 12.5 mg by mouth daily, Disp: , Rfl:     Continuous Blood Gluc  (FREESTYLE VIKA 14 DAY READER) ELPIDIO, 1 Device by Does not apply route 4 times daily (before meals and nightly), Disp: 1 Device, Rfl: 0    Continuous Blood Gluc Sensor (FREESTYLE VIKA 14 DAY SENSOR) MISC, 2 Devices by Does not apply route every 14 days, Disp: 2 each, Rfl: 3    blood glucose monitor strips, 1 strip by Other route 4 times daily (before meals and nightly), Disp: 150 strip, Rfl: 3    blood glucose monitor kit and supplies, 1 kit by Other route 4 times daily (before meals and nightly), Disp: 1 kit, Rfl: 0    Lancets MISC, 1 each by Does not apply route 4 times daily (before meals and nightly), Disp: 150 each, Rfl: 3    insulin glargine (LANTUS SOLOSTAR) 100 UNIT/ML injection pen, Inject 15 Units into the skin nightly, Disp: 5 pen, Rfl: 3    insulin lispro (HUMALOG KWIKPEN) 100 UNIT/ML pen, Inject 5 Units into the skin 3 times daily (before meals), Disp: 5 pen, Rfl: 3    budesonide (PULMICORT) 0.5 MG/2ML nebulizer suspension, Take 2 mLs by nebulization 2 times daily, Disp: 60 ampule, Rfl: 3    ipratropium-albuterol (DUONEB) 0.5-2.5 (3) MG/3ML SOLN nebulizer solution, Inhale 3 mLs into the lungs every 4 hours as needed for Shortness of Breath, Disp: 360 mL, Rfl: 0    aspirin 81 MG EC tablet, Take 1 tablet by mouth daily, Disp: 30 tablet, Rfl: 3    atorvastatin (LIPITOR) 40 MG tablet, Take 1 tablet by mouth nightly, Disp: 30 tablet, Rfl: 3    lisinopril (PRINIVIL;ZESTRIL) 5 MG tablet, Take 1 tablet by mouth daily, Disp: 30 tablet, Rfl: 3    oxybutynin (DITROPAN) 5 MG tablet, Take 1 tablet by mouth 4 times daily, Disp: 90 tablet, Rfl: 3    vitamin D (CHOLECALCIFEROL) 1000 UNIT TABS tablet, Take 1 tablet by mouth Daily with lunch, Disp: 60 tablet, Rfl: 0    insulin lispro (HUMALOG) 100 UNIT/ML injection vial, Inject 4 Units into the skin 3 times daily (with rhinorrhea, sinus pressure and sore throat. Eyes: Negative for pain and redness. Respiratory: Negative for cough, shortness of breath and wheezing. Cardiovascular: Negative for chest pain, palpitations and leg swelling. Gastrointestinal: Positive for abdominal distention. Negative for abdominal pain, diarrhea, nausea and vomiting. Endocrine: Negative for cold intolerance, heat intolerance, polydipsia and polyuria. Genitourinary: Negative for difficulty urinating. Musculoskeletal: Positive for back pain, gait problem, myalgias and neck pain. Negative for arthralgias. Skin: Negative for rash. Allergic/Immunologic: Negative for environmental allergies. Neurological: Positive for tremors and weakness. Negative for dizziness and headaches. Psychiatric/Behavioral: Positive for confusion. Objective:   Physical Exam   Constitutional: She is oriented to person, place, and time. She appears well-developed and well-nourished. HENT:   Head: Normocephalic and atraumatic. Eyes: Conjunctivae and EOM are normal.   Neck: Normal range of motion. Neck supple. No thyromegaly present. Cardiovascular: Normal rate, regular rhythm and normal heart sounds. Pulmonary/Chest: Effort normal. No respiratory distress. She has wheezes. Abdominal: Soft. Bowel sounds are normal. She exhibits no distension. There is no tenderness. Musculoskeletal: Normal range of motion. Significant right foot drop and right leg weakness resulting in gait abnormality patient walks with a walker  Bilateral foot exam showed 1+ DP\PT pulses no wounds lesions or ulcerations diminished sensation in both plantar surfaces of feet   Lymphadenopathy:     She has no cervical adenopathy. Neurological: She is alert and oriented to person, place, and time. Skin: Skin is warm and dry. Psychiatric: She has a normal mood and affect. Vitals reviewed.

## 2019-08-27 NOTE — PATIENT INSTRUCTIONS
Endocrinology-diabetes    1. Check your blood sugars 4 times a day, before meals and at night  2. Document these numbers and a blood glucose log and bring them with you to your follow-up appointment. 3. Do not take your mealtime insulin if your blood sugars less than 100  4. Call our office if you have blood sugars less than 80 or greater then 200 on two or more occasions  5. Call our office if you have any questions regarding your blood sugars or insulin dosing regiment  6. Signs of low blood sugar include sweating , heart racing, dizziness and weakness. Check your blood sugar if you have any of these symptoms. Medications   1. Lantus 15 units at night  2.  Humalog 5 units 10-15 minutes before meals

## 2019-08-28 ENCOUNTER — HOSPITAL ENCOUNTER (EMERGENCY)
Age: 69
Discharge: HOME | End: 2019-08-28
Payer: MEDICARE

## 2019-08-28 ENCOUNTER — APPOINTMENT (OUTPATIENT)
Dept: GENERAL RADIOLOGY | Age: 69
End: 2019-08-28
Payer: MEDICARE

## 2019-08-28 VITALS
OXYGEN SATURATION: 93 % | WEIGHT: 200 LBS | TEMPERATURE: 98.6 F | HEIGHT: 63 IN | HEART RATE: 103 BPM | BODY MASS INDEX: 35.44 KG/M2 | RESPIRATION RATE: 18 BRPM | DIASTOLIC BLOOD PRESSURE: 80 MMHG | SYSTOLIC BLOOD PRESSURE: 133 MMHG
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
## 2019-08-28 DIAGNOSIS — S80.11XA CONTUSION OF LEG, RIGHT, INITIAL ENCOUNTER: ICD-10-CM

## 2019-08-28 PROCEDURE — 99283 EMERGENCY DEPT VISIT LOW MDM: CPT

## 2019-08-28 PROCEDURE — 6830039000 HC L3 TRAUMA ALERT

## 2019-08-28 PROCEDURE — 72170 X-RAY EXAM OF PELVIS: CPT

## 2019-08-28 PROCEDURE — 73552 X-RAY EXAM OF FEMUR 2/>: CPT

## 2019-08-28 ASSESSMENT — PAIN DESCRIPTION - ORIENTATION: ORIENTATION: RIGHT

## 2019-08-28 ASSESSMENT — PAIN DESCRIPTION - LOCATION: LOCATION: HIP;LEG

## 2019-08-28 ASSESSMENT — PAIN SCALES - GENERAL: PAINLEVEL_OUTOF10: 6

## 2019-08-28 ASSESSMENT — ENCOUNTER SYMPTOMS
SHORTNESS OF BREATH: 0
SORE THROAT: 0
NAUSEA: 0
COUGH: 0
VOMITING: 0
BACK PAIN: 0
TROUBLE SWALLOWING: 0
ABDOMINAL PAIN: 0
DIARRHEA: 0

## 2019-08-28 ASSESSMENT — PAIN DESCRIPTION - PAIN TYPE: TYPE: ACUTE PAIN

## 2019-08-28 ASSESSMENT — PAIN DESCRIPTION - DESCRIPTORS: DESCRIPTORS: ACHING

## 2019-08-28 NOTE — ED PROVIDER NOTES
Medications    ALBUTEROL SULFATE  (90 BASE) MCG/ACT INHALER    Inhale 2 puffs into the lungs every 6 hours as needed for Wheezing    ASPIRIN 81 MG EC TABLET    Take 1 tablet by mouth daily    ATORVASTATIN (LIPITOR) 40 MG TABLET    Take 1 tablet by mouth nightly    BLOOD GLUCOSE MONITOR KIT AND SUPPLIES    1 kit by Other route 4 times daily (before meals and nightly)    BLOOD GLUCOSE MONITOR STRIPS    1 strip by Other route 4 times daily (before meals and nightly)    BUDESONIDE (PULMICORT) 0.5 MG/2ML NEBULIZER SUSPENSION    Take 2 mLs by nebulization 2 times daily    CONTINUOUS BLOOD GLUC  (FREESTYLE VIKA 14 DAY READER) ELPIDIO    1 Device by Does not apply route 4 times daily (before meals and nightly)    CONTINUOUS BLOOD GLUC SENSOR (FREESTYLE VIKA 14 DAY SENSOR) MISC    2 Devices by Does not apply route every 14 days    HYDROCHLOROTHIAZIDE (HYDRODIURIL) 12.5 MG TABLET    Take 12.5 mg by mouth daily    INSULIN GLARGINE (LANTUS SOLOSTAR) 100 UNIT/ML INJECTION PEN    Inject 15 Units into the skin nightly    INSULIN GLARGINE (LANTUS) 100 UNIT/ML INJECTION VIAL    Inject 15 Units into the skin nightly    INSULIN LISPRO (HUMALOG KWIKPEN) 100 UNIT/ML PEN    Inject 5 Units into the skin 3 times daily (before meals)    INSULIN LISPRO (HUMALOG) 100 UNIT/ML INJECTION VIAL    Inject 4 Units into the skin 3 times daily (with meals)    IPRATROPIUM-ALBUTEROL (DUONEB) 0.5-2.5 (3) MG/3ML SOLN NEBULIZER SOLUTION    Inhale 3 mLs into the lungs every 4 hours as needed for Shortness of Breath    ISOSORBIDE MONONITRATE (IMDUR) 30 MG EXTENDED RELEASE TABLET    Take 30 mg by mouth daily    LANCETS MISC    1 each by Does not apply route 4 times daily (before meals and nightly)    LISINOPRIL (PRINIVIL;ZESTRIL) 5 MG TABLET    Take 1 tablet by mouth daily    OXYBUTYNIN (DITROPAN) 5 MG TABLET    Take 1 tablet by mouth 4 times daily    VITAMIN D (CHOLECALCIFEROL) 1000 UNIT TABS TABLET    Take 1 tablet by mouth Daily with lunch Homemaking Assistance: Needs assistance(pt reported dtr assist with cleaning )    Ambulation Assistance: Independent    Transfer Assistance: Independent    Active : No(dtr drives )       SCREENINGS    Janice Coma Scale  Eye Opening: Spontaneous  Best Verbal Response: Oriented  Best Motor Response: Obeys commands  Janice Coma Scale Score: 15 @FLOW([id number])@      PHYSICAL EXAM    (up to 7 for level 4, 8 or more for level 5)     ED Triage Vitals   BP Temp Temp Source Pulse Resp SpO2 Height Weight   08/28/19 1049 08/28/19 1051 08/28/19 1051 08/28/19 1049 08/28/19 1051 08/28/19 1051 08/28/19 1051 08/28/19 1051   (!) 140/82 98.6 °F (37 °C) Oral 110 18 93 % 5' 3\" (1.6 m) 200 lb (90.7 kg)       Physical Exam   Constitutional: She is oriented to person, place, and time. She appears well-developed and well-nourished. HENT:   Head: Normocephalic and atraumatic. Right Ear: External ear normal.   Left Ear: External ear normal.   Mouth/Throat: Oropharynx is clear and moist.   Eyes: Pupils are equal, round, and reactive to light. Conjunctivae and EOM are normal.   Neck: Normal range of motion. Neck supple. Cardiovascular: Normal rate and regular rhythm. Pulmonary/Chest: Effort normal and breath sounds normal.   Abdominal: Soft. Bowel sounds are normal. She exhibits no distension. There is no tenderness. Musculoskeletal: Normal range of motion. Right upper leg: She exhibits tenderness. She exhibits no swelling and no deformity. Legs:  Neurological: She is alert and oriented to person, place, and time. She has normal reflexes. Skin: Skin is warm and dry. Psychiatric: Judgment normal.   Nursing note and vitals reviewed. All other labs were within normal range or not returned as of this dictation.     EMERGENCY DEPARTMENT COURSE and DIFFERENTIALDIAGNOSIS/MDM:   Vitals:    Vitals:    08/28/19 1049 08/28/19 1051   BP: (!) 140/82 133/80   Pulse: 110 103   Resp:  18   Temp:  98.6 °F (37 °C)

## 2019-08-30 RX ORDER — INSULIN LISPRO 100 [IU]/ML
INJECTION, SOLUTION INTRAVENOUS; SUBCUTANEOUS
Qty: 18 ML | Refills: 3 | Status: SHIPPED | OUTPATIENT
Start: 2019-08-30

## 2019-09-25 ENCOUNTER — OFFICE VISIT (OUTPATIENT)
Dept: ENDOCRINOLOGY | Age: 69
End: 2019-09-25
Payer: MEDICARE

## 2019-09-25 VITALS
DIASTOLIC BLOOD PRESSURE: 95 MMHG | SYSTOLIC BLOOD PRESSURE: 156 MMHG | HEART RATE: 91 BPM | BODY MASS INDEX: 35.43 KG/M2 | HEIGHT: 63 IN

## 2019-09-25 DIAGNOSIS — Z79.4 TYPE 2 DIABETES MELLITUS WITHOUT COMPLICATION, WITH LONG-TERM CURRENT USE OF INSULIN (HCC): Primary | ICD-10-CM

## 2019-09-25 DIAGNOSIS — J44.9 CHRONIC OBSTRUCTIVE PULMONARY DISEASE, UNSPECIFIED COPD TYPE (HCC): ICD-10-CM

## 2019-09-25 DIAGNOSIS — Z76.89 ESTABLISHING CARE WITH NEW DOCTOR, ENCOUNTER FOR: ICD-10-CM

## 2019-09-25 DIAGNOSIS — E11.9 TYPE 2 DIABETES MELLITUS WITHOUT COMPLICATION, WITH LONG-TERM CURRENT USE OF INSULIN (HCC): Primary | ICD-10-CM

## 2019-09-25 DIAGNOSIS — R06.02 SHORTNESS OF BREATH: ICD-10-CM

## 2019-09-25 PROBLEM — E11.65 TYPE 2 DIABETES MELLITUS WITH HYPERGLYCEMIA, WITH LONG-TERM CURRENT USE OF INSULIN (HCC): Status: RESOLVED | Noted: 2019-05-22 | Resolved: 2019-09-25

## 2019-09-25 PROBLEM — E11.59 TYPE 2 DIABETES MELLITUS WITH VASCULAR DISEASE (HCC): Status: RESOLVED | Noted: 2017-12-02 | Resolved: 2019-09-25

## 2019-09-25 PROBLEM — J45.20 MILD INTERMITTENT ASTHMA WITHOUT COMPLICATION: Status: RESOLVED | Noted: 2017-12-02 | Resolved: 2019-09-25

## 2019-09-25 LAB
CHP ED QC CHECK: NORMAL
GLUCOSE BLD-MCNC: 180 MG/DL

## 2019-09-25 PROCEDURE — 82962 GLUCOSE BLOOD TEST: CPT | Performed by: PHYSICIAN ASSISTANT

## 2019-09-25 PROCEDURE — 99214 OFFICE O/P EST MOD 30 MIN: CPT | Performed by: PHYSICIAN ASSISTANT

## 2019-09-25 ASSESSMENT — ENCOUNTER SYMPTOMS
SINUS PRESSURE: 0
ABDOMINAL PAIN: 0
BACK PAIN: 1
SORE THROAT: 0
EYE REDNESS: 0
VOMITING: 0
SHORTNESS OF BREATH: 0
WHEEZING: 0
ABDOMINAL DISTENTION: 1
COUGH: 0
NAUSEA: 0
DIARRHEA: 0
EYE PAIN: 0
RHINORRHEA: 0

## 2019-09-25 NOTE — PROGRESS NOTES
pulmonary disease) ([facility]     History of pneumonia     Hypertension     Lumbar spondylosis     Obesity (BMI 30.0-34. 9)     Osteoarthritis     Uncontrolled diabetes mellitus type 2 without complications ([facility]      Past Surgical History:   Procedure Laterality Date    ANKLE FRACTURE SURGERY Left     APPENDECTOMY  1978    HYSTERECTOMY  1983    TONSILLECTOMY  1966     Social History     Socioeconomic History    Marital status:      Spouse name: Not on file    Number of children: Not on file    Years of education: Not on file    Highest education level: Not on file   Occupational History    Occupation: retired   Social Needs    Financial resource strain: Not on file    Food insecurity:     Worry: Not on file     Inability: Not on file   [employer] needs:     Medical: Not on file     Non-medical: Not on file   Tobacco Use    Smoking status: Former Smoker     Last attempt to quit: 10/19/2009     Years since quittin.9    Smokeless tobacco: Never Used   Substance and Sexual Activity    Alcohol use: No     Comment: recovering alcoholic, 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 2019    Drug use: Never    Sexual activity: Not on file   Lifestyle    Physical activity:     Days per week: Not on file     Minutes per session: Not on file    Stress: Not on file   Relationships    Social connections:     Talks on phone: Not on file     Gets together: Not on file     Attends Yazdanism service: Not on file     Active member of club or organization: Not on file     Attends meetings of clubs or organizations: Not on file     Relationship status: Not on file    Intimate partner violence:     Fear of current or ex partner: Not on file     Emotionally abused: Not on file     Physically abused: Not on file     Forced sexual activity: Not on file   Other Topics Concern    Not on file   Social History Narrative    , 3 children (son incarcerated 2019, daughter in [place] and daughter in [facility] 65     5 brothers and 2 sisters, for Shortness of Breath, Disp: 360 mL, Rfl: 0    aspirin 81 MG EC tablet, Take 1 tablet by mouth daily, Disp: 30 tablet, Rfl: 3    atorvastatin (LIPITOR) 40 MG tablet, Take 1 tablet by mouth nightly, Disp: 30 tablet, Rfl: 3    lisinopril (PRINIVIL;ZESTRIL) 5 MG tablet, Take 1 tablet by mouth daily, Disp: 30 tablet, Rfl: 3    oxybutynin (DITROPAN) 5 MG tablet, Take 1 tablet by mouth 4 times daily, Disp: 90 tablet, Rfl: 3    vitamin D (CHOLECALCIFEROL) 1000 UNIT TABS tablet, Take 1 tablet by mouth Daily with lunch, Disp: 60 tablet, Rfl: 0    insulin glargine (LANTUS) 100 UNIT/ML injection vial, Inject 15 Units into the skin nightly (Patient taking differently: Inject 18 Units into the skin nightly ), Disp: 1 vial, Rfl: 3    albuterol sulfate  (90 Base) MCG/ACT inhaler, Inhale 2 puffs into the lungs every 6 hours as needed for Wheezing, Disp: , Rfl:   Lab Results   Component Value Date     05/23/2019    K 3.9 05/23/2019     05/23/2019    CO2 25 05/23/2019    BUN 17 05/23/2019    CREATININE 0.53 05/23/2019    GLUCOSE 180 09/25/2019    CALCIUM 9.3 05/23/2019    PROT 7.2 05/17/2019    LABALBU 4.1 05/17/2019    BILITOT 0.3 05/17/2019    ALKPHOS 89 05/17/2019    AST 15 05/17/2019    ALT 12 05/17/2019    LABGLOM >60.0 05/23/2019    GFRAA >60.0 05/23/2019    GLOB 3.1 05/17/2019     Lab Results   Component Value Date    WBC 9.8 05/23/2019    HGB 12.9 05/23/2019    HCT 38.0 05/23/2019    MCV 89.2 05/23/2019     05/23/2019     Lab Results   Component Value Date    LABA1C 8.5 08/27/2019    LABA1C 10.0 (H) 05/18/2019    LABA1C 11.6 (H) 11/24/2013     Lab Results   Component Value Date    CHOL 147 05/18/2019    CHOL 196 11/24/2013    CHOL 177 09/12/2013     Lab Results   Component Value Date    TRIG 97 05/18/2019    TRIG 131 11/24/2013    TRIG 124 09/12/2013     Lab Results   Component Value Date    HDL 45 05/18/2019    HDL 52 11/24/2013    HDL 49 09/12/2013     Lab Results   Component Value Date

## 2019-10-17 ENCOUNTER — APPOINTMENT (OUTPATIENT)
Dept: GENERAL RADIOLOGY | Age: 69
DRG: 062 | End: 2019-10-17
Payer: MEDICARE

## 2019-10-17 ENCOUNTER — APPOINTMENT (OUTPATIENT)
Dept: CT IMAGING | Age: 69
DRG: 062 | End: 2019-10-17
Payer: MEDICARE

## 2019-10-17 ENCOUNTER — HOSPITAL ENCOUNTER (INPATIENT)
Age: 69
LOS: 5 days | Discharge: NURSING, REHAB OR LONG-TERM CARE | DRG: 062 | End: 2019-10-22
Attending: INTERNAL MEDICINE | Admitting: INTERNAL MEDICINE
Payer: MEDICARE

## 2019-10-17 DIAGNOSIS — I63.9 CEREBROVASCULAR ACCIDENT (CVA), UNSPECIFIED MECHANISM (HCC): Primary | ICD-10-CM

## 2019-10-17 LAB
ALBUMIN SERPL-MCNC: 4.5 G/DL (ref 3.5–4.6)
ALP BLD-CCNC: 89 U/L (ref 40–130)
ALT SERPL-CCNC: 11 U/L (ref 0–33)
AMPHETAMINE SCREEN, URINE: NORMAL
ANION GAP SERPL CALCULATED.3IONS-SCNC: 17 MEQ/L (ref 9–15)
APTT: 26.3 SEC (ref 24.4–36.8)
AST SERPL-CCNC: 14 U/L (ref 0–35)
BARBITURATE SCREEN URINE: NORMAL
BASOPHILS ABSOLUTE: 0.1 K/UL (ref 0–0.2)
BASOPHILS RELATIVE PERCENT: 0.4 %
BENZODIAZEPINE SCREEN, URINE: NORMAL
BILIRUB SERPL-MCNC: 0.9 MG/DL (ref 0.2–0.7)
BILIRUBIN URINE: NEGATIVE
BLOOD, URINE: ABNORMAL
BUN BLDV-MCNC: 12 MG/DL (ref 8–23)
CALCIUM SERPL-MCNC: 9.6 MG/DL (ref 8.5–9.9)
CANNABINOID SCREEN URINE: NORMAL
CHLORIDE BLD-SCNC: 101 MEQ/L (ref 95–107)
CHP ED QC CHECK: YES
CLARITY: ABNORMAL
CO2: 23 MEQ/L (ref 20–31)
COCAINE METABOLITE SCREEN URINE: NORMAL
COLOR: YELLOW
CREAT SERPL-MCNC: 0.53 MG/DL (ref 0.5–0.9)
EKG ATRIAL RATE: 108 BPM
EKG P AXIS: 62 DEGREES
EKG P-R INTERVAL: 150 MS
EKG Q-T INTERVAL: 330 MS
EKG QRS DURATION: 80 MS
EKG QTC CALCULATION (BAZETT): 442 MS
EKG R AXIS: 33 DEGREES
EKG T AXIS: 56 DEGREES
EKG VENTRICULAR RATE: 108 BPM
EOSINOPHILS ABSOLUTE: 0.1 K/UL (ref 0–0.7)
EOSINOPHILS RELATIVE PERCENT: 0.6 %
ETHANOL PERCENT: NORMAL G/DL
ETHANOL: <10 MG/DL (ref 0–0.08)
GFR AFRICAN AMERICAN: >60
GFR NON-AFRICAN AMERICAN: >60
GLOBULIN: 3.8 G/DL (ref 2.3–3.5)
GLUCOSE BLD-MCNC: 93 MG/DL
GLUCOSE BLD-MCNC: 97 MG/DL (ref 70–99)
GLUCOSE URINE: 100 MG/DL
HCT VFR BLD CALC: 42.7 % (ref 37–47)
HEMOGLOBIN: 14.7 G/DL (ref 12–16)
INR BLD: 1
KETONES, URINE: NEGATIVE MG/DL
LEUKOCYTE ESTERASE, URINE: ABNORMAL
LYMPHOCYTES ABSOLUTE: 2.1 K/UL (ref 1–4.8)
LYMPHOCYTES RELATIVE PERCENT: 15.8 %
Lab: NORMAL
MCH RBC QN AUTO: 30.3 PG (ref 27–31.3)
MCHC RBC AUTO-ENTMCNC: 34.4 % (ref 33–37)
MCV RBC AUTO: 88.2 FL (ref 82–100)
METHADONE SCREEN, URINE: NORMAL
MONOCYTES ABSOLUTE: 0.9 K/UL (ref 0.2–0.8)
MONOCYTES RELATIVE PERCENT: 6.7 %
NEUTROPHILS ABSOLUTE: 10.3 K/UL (ref 1.4–6.5)
NEUTROPHILS RELATIVE PERCENT: 76.5 %
NITRITE, URINE: POSITIVE
OPIATE SCREEN URINE: NORMAL
OXYCODONE URINE: NORMAL
PDW BLD-RTO: 14 % (ref 11.5–14.5)
PH UA: 6.5 (ref 5–9)
PHENCYCLIDINE SCREEN URINE: NORMAL
PLATELET # BLD: 245 K/UL (ref 130–400)
POTASSIUM SERPL-SCNC: 3.9 MEQ/L (ref 3.4–4.9)
PROPOXYPHENE SCREEN: NORMAL
PROTEIN UA: 100 MG/DL
PROTHROMBIN TIME: 13.6 SEC (ref 12.3–14.9)
RBC # BLD: 4.84 M/UL (ref 4.2–5.4)
SODIUM BLD-SCNC: 141 MEQ/L (ref 135–144)
SPECIFIC GRAVITY UA: 1.03 (ref 1–1.03)
TOTAL CK: 74 U/L (ref 0–170)
TOTAL PROTEIN: 8.3 G/DL (ref 6.3–8)
TROPONIN: <0.01 NG/ML (ref 0–0.01)
URINE REFLEX TO CULTURE: YES
UROBILINOGEN, URINE: 0.2 E.U./DL
WBC # BLD: 13.5 K/UL (ref 4.8–10.8)

## 2019-10-17 PROCEDURE — 2580000003 HC RX 258: Performed by: PERSONAL EMERGENCY RESPONSE ATTENDANT

## 2019-10-17 PROCEDURE — 99285 EMERGENCY DEPT VISIT HI MDM: CPT

## 2019-10-17 PROCEDURE — 84484 ASSAY OF TROPONIN QUANT: CPT

## 2019-10-17 PROCEDURE — 70496 CT ANGIOGRAPHY HEAD: CPT

## 2019-10-17 PROCEDURE — 85025 COMPLETE CBC W/AUTO DIFF WBC: CPT

## 2019-10-17 PROCEDURE — 86901 BLOOD TYPING SEROLOGIC RH(D): CPT

## 2019-10-17 PROCEDURE — 93005 ELECTROCARDIOGRAM TRACING: CPT | Performed by: PERSONAL EMERGENCY RESPONSE ATTENDANT

## 2019-10-17 PROCEDURE — 3E03317 INTRODUCTION OF OTHER THROMBOLYTIC INTO PERIPHERAL VEIN, PERCUTANEOUS APPROACH: ICD-10-PCS | Performed by: INTERNAL MEDICINE

## 2019-10-17 PROCEDURE — G0480 DRUG TEST DEF 1-7 CLASSES: HCPCS

## 2019-10-17 PROCEDURE — 36415 COLL VENOUS BLD VENIPUNCTURE: CPT

## 2019-10-17 PROCEDURE — 6360000004 HC RX CONTRAST MEDICATION: Performed by: PERSONAL EMERGENCY RESPONSE ATTENDANT

## 2019-10-17 PROCEDURE — 70498 CT ANGIOGRAPHY NECK: CPT

## 2019-10-17 PROCEDURE — 82550 ASSAY OF CK (CPK): CPT

## 2019-10-17 PROCEDURE — 81001 URINALYSIS AUTO W/SCOPE: CPT

## 2019-10-17 PROCEDURE — 70450 CT HEAD/BRAIN W/O DYE: CPT

## 2019-10-17 PROCEDURE — 86850 RBC ANTIBODY SCREEN: CPT

## 2019-10-17 PROCEDURE — 85610 PROTHROMBIN TIME: CPT

## 2019-10-17 PROCEDURE — 87077 CULTURE AEROBIC IDENTIFY: CPT

## 2019-10-17 PROCEDURE — 86900 BLOOD TYPING SEROLOGIC ABO: CPT

## 2019-10-17 PROCEDURE — 85730 THROMBOPLASTIN TIME PARTIAL: CPT

## 2019-10-17 PROCEDURE — 37195 THROMBOLYTIC THERAPY STROKE: CPT

## 2019-10-17 PROCEDURE — 71045 X-RAY EXAM CHEST 1 VIEW: CPT

## 2019-10-17 PROCEDURE — 80053 COMPREHEN METABOLIC PANEL: CPT

## 2019-10-17 PROCEDURE — 87086 URINE CULTURE/COLONY COUNT: CPT

## 2019-10-17 PROCEDURE — 6360000002 HC RX W HCPCS: Performed by: PERSONAL EMERGENCY RESPONSE ATTENDANT

## 2019-10-17 PROCEDURE — 80307 DRUG TEST PRSMV CHEM ANLYZR: CPT

## 2019-10-17 PROCEDURE — 2000000000 HC ICU R&B

## 2019-10-17 PROCEDURE — 87186 SC STD MICRODIL/AGAR DIL: CPT

## 2019-10-17 RX ORDER — 0.9 % SODIUM CHLORIDE 0.9 %
50 INTRAVENOUS SOLUTION INTRAVENOUS ONCE
Status: COMPLETED | OUTPATIENT
Start: 2019-10-17 | End: 2019-10-17

## 2019-10-17 RX ORDER — DEXTROSE MONOHYDRATE 25 G/50ML
12.5 INJECTION, SOLUTION INTRAVENOUS
Status: ACTIVE | OUTPATIENT
Start: 2019-10-17 | End: 2019-10-17

## 2019-10-17 RX ORDER — SODIUM CHLORIDE 0.9 % (FLUSH) 0.9 %
10 SYRINGE (ML) INJECTION EVERY 12 HOURS SCHEDULED
Status: DISCONTINUED | OUTPATIENT
Start: 2019-10-17 | End: 2019-10-18

## 2019-10-17 RX ORDER — LABETALOL 20 MG/4 ML (5 MG/ML) INTRAVENOUS SYRINGE
20 ONCE
Status: DISCONTINUED | OUTPATIENT
Start: 2019-10-17 | End: 2019-10-22 | Stop reason: HOSPADM

## 2019-10-17 RX ORDER — SODIUM CHLORIDE 0.9 % (FLUSH) 0.9 %
10 SYRINGE (ML) INJECTION PRN
Status: DISCONTINUED | OUTPATIENT
Start: 2019-10-17 | End: 2019-10-18

## 2019-10-17 RX ADMIN — SODIUM CHLORIDE 50 ML: 9 INJECTION, SOLUTION INTRAVENOUS at 22:23

## 2019-10-17 RX ADMIN — ALTEPLASE 73.9 MG: KIT at 21:37

## 2019-10-17 RX ADMIN — ALTEPLASE 8.2 MG: KIT at 21:37

## 2019-10-17 RX ADMIN — IOPAMIDOL 100 ML: 612 INJECTION, SOLUTION INTRAVENOUS at 23:01

## 2019-10-17 ASSESSMENT — ENCOUNTER SYMPTOMS
ABDOMINAL PAIN: 0
SORE THROAT: 0
SHORTNESS OF BREATH: 0
DIARRHEA: 0
RHINORRHEA: 0
BLOOD IN STOOL: 0
NAUSEA: 0
COLOR CHANGE: 0
VOMITING: 0
COUGH: 0

## 2019-10-18 ENCOUNTER — APPOINTMENT (OUTPATIENT)
Dept: ULTRASOUND IMAGING | Age: 69
DRG: 062 | End: 2019-10-18
Payer: MEDICARE

## 2019-10-18 ENCOUNTER — APPOINTMENT (OUTPATIENT)
Dept: MRI IMAGING | Age: 69
DRG: 062 | End: 2019-10-18
Payer: MEDICARE

## 2019-10-18 PROBLEM — E11.9 TYPE 2 DIABETES MELLITUS WITHOUT COMPLICATION, WITH LONG-TERM CURRENT USE OF INSULIN (HCC): Chronic | Status: ACTIVE | Noted: 2017-02-22

## 2019-10-18 PROBLEM — R00.0 TACHYCARDIA: Status: ACTIVE | Noted: 2019-10-18

## 2019-10-18 PROBLEM — Z79.4 TYPE 2 DIABETES MELLITUS WITHOUT COMPLICATION, WITH LONG-TERM CURRENT USE OF INSULIN (HCC): Chronic | Status: ACTIVE | Noted: 2017-02-22

## 2019-10-18 PROBLEM — I10 UNCONTROLLED HYPERTENSION: Status: ACTIVE | Noted: 2019-10-18

## 2019-10-18 PROBLEM — N30.01 ACUTE CYSTITIS WITH HEMATURIA: Status: ACTIVE | Noted: 2019-10-18

## 2019-10-18 LAB
ABO/RH: NORMAL
ANTIBODY SCREEN: NORMAL
BACTERIA: ABNORMAL /HPF
EPITHELIAL CELLS, UA: ABNORMAL /HPF (ref 0–5)
GLUCOSE BLD-MCNC: 119 MG/DL (ref 60–115)
GLUCOSE BLD-MCNC: 126 MG/DL (ref 60–115)
GLUCOSE BLD-MCNC: 133 MG/DL (ref 60–115)
GLUCOSE BLD-MCNC: 159 MG/DL (ref 60–115)
GLUCOSE BLD-MCNC: 185 MG/DL (ref 60–115)
GLUCOSE BLD-MCNC: 221 MG/DL (ref 60–115)
HYALINE CASTS: ABNORMAL /HPF (ref 0–5)
INR BLD: 1.3
LV EF: 60 %
LVEF MODALITY: NORMAL
PERFORMED ON: ABNORMAL
POC SAMPLE TYPE: ABNORMAL
PROTHROMBIN TIME, POC: 15.2 SEC (ref 9.5–12.5)
RBC UA: ABNORMAL /HPF (ref 0–5)
WBC UA: ABNORMAL /HPF (ref 0–5)

## 2019-10-18 PROCEDURE — 99222 1ST HOSP IP/OBS MODERATE 55: CPT | Performed by: PHYSICAL MEDICINE & REHABILITATION

## 2019-10-18 PROCEDURE — 93306 TTE W/DOPPLER COMPLETE: CPT

## 2019-10-18 PROCEDURE — 94640 AIRWAY INHALATION TREATMENT: CPT

## 2019-10-18 PROCEDURE — 70544 MR ANGIOGRAPHY HEAD W/O DYE: CPT

## 2019-10-18 PROCEDURE — 2700000000 HC OXYGEN THERAPY PER DAY

## 2019-10-18 PROCEDURE — 93880 EXTRACRANIAL BILAT STUDY: CPT

## 2019-10-18 PROCEDURE — 2580000003 HC RX 258: Performed by: HOSPITALIST

## 2019-10-18 PROCEDURE — 99223 1ST HOSP IP/OBS HIGH 75: CPT | Performed by: PSYCHIATRY & NEUROLOGY

## 2019-10-18 PROCEDURE — 94664 DEMO&/EVAL PT USE INHALER: CPT

## 2019-10-18 PROCEDURE — 6370000000 HC RX 637 (ALT 250 FOR IP): Performed by: HOSPITALIST

## 2019-10-18 PROCEDURE — 6360000002 HC RX W HCPCS: Performed by: HOSPITALIST

## 2019-10-18 PROCEDURE — 1210000000 HC MED SURG R&B

## 2019-10-18 PROCEDURE — 70547 MR ANGIOGRAPHY NECK W/O DYE: CPT

## 2019-10-18 PROCEDURE — 70551 MRI BRAIN STEM W/O DYE: CPT

## 2019-10-18 PROCEDURE — 93010 ELECTROCARDIOGRAM REPORT: CPT | Performed by: INTERNAL MEDICINE

## 2019-10-18 RX ORDER — ATORVASTATIN CALCIUM 80 MG/1
80 TABLET, FILM COATED ORAL NIGHTLY
Status: DISCONTINUED | OUTPATIENT
Start: 2019-10-18 | End: 2019-10-22 | Stop reason: HOSPADM

## 2019-10-18 RX ORDER — ATORVASTATIN CALCIUM 80 MG/1
80 TABLET, FILM COATED ORAL NIGHTLY
Status: DISCONTINUED | OUTPATIENT
Start: 2019-10-18 | End: 2019-10-18

## 2019-10-18 RX ORDER — DEXTROSE MONOHYDRATE 50 MG/ML
100 INJECTION, SOLUTION INTRAVENOUS PRN
Status: DISCONTINUED | OUTPATIENT
Start: 2019-10-18 | End: 2019-10-22 | Stop reason: HOSPADM

## 2019-10-18 RX ORDER — LISINOPRIL 5 MG/1
5 TABLET ORAL DAILY
Status: DISCONTINUED | OUTPATIENT
Start: 2019-10-18 | End: 2019-10-22 | Stop reason: HOSPADM

## 2019-10-18 RX ORDER — IPRATROPIUM BROMIDE AND ALBUTEROL SULFATE 2.5; .5 MG/3ML; MG/3ML
3 SOLUTION RESPIRATORY (INHALATION) EVERY 4 HOURS PRN
Status: DISCONTINUED | OUTPATIENT
Start: 2019-10-18 | End: 2019-10-22 | Stop reason: HOSPADM

## 2019-10-18 RX ORDER — BUDESONIDE 0.5 MG/2ML
0.5 INHALANT ORAL 2 TIMES DAILY
Status: DISCONTINUED | OUTPATIENT
Start: 2019-10-18 | End: 2019-10-22 | Stop reason: HOSPADM

## 2019-10-18 RX ORDER — ASPIRIN 81 MG/1
81 TABLET ORAL DAILY
Status: DISCONTINUED | OUTPATIENT
Start: 2019-10-19 | End: 2019-10-20

## 2019-10-18 RX ORDER — NICOTINE POLACRILEX 4 MG
15 LOZENGE BUCCAL PRN
Status: DISCONTINUED | OUTPATIENT
Start: 2019-10-18 | End: 2019-10-22 | Stop reason: HOSPADM

## 2019-10-18 RX ORDER — SODIUM CHLORIDE 0.9 % (FLUSH) 0.9 %
10 SYRINGE (ML) INJECTION EVERY 12 HOURS SCHEDULED
Status: DISCONTINUED | OUTPATIENT
Start: 2019-10-18 | End: 2019-10-22 | Stop reason: HOSPADM

## 2019-10-18 RX ORDER — LABETALOL 20 MG/4 ML (5 MG/ML) INTRAVENOUS SYRINGE
10 EVERY 10 MIN PRN
Status: DISCONTINUED | OUTPATIENT
Start: 2019-10-18 | End: 2019-10-22 | Stop reason: HOSPADM

## 2019-10-18 RX ORDER — ISOSORBIDE MONONITRATE 30 MG/1
30 TABLET, EXTENDED RELEASE ORAL DAILY
Status: DISCONTINUED | OUTPATIENT
Start: 2019-10-18 | End: 2019-10-22 | Stop reason: HOSPADM

## 2019-10-18 RX ORDER — ONDANSETRON 2 MG/ML
4 INJECTION INTRAMUSCULAR; INTRAVENOUS EVERY 6 HOURS PRN
Status: DISCONTINUED | OUTPATIENT
Start: 2019-10-18 | End: 2019-10-22 | Stop reason: HOSPADM

## 2019-10-18 RX ORDER — HYDROCHLOROTHIAZIDE 12.5 MG/1
12.5 TABLET ORAL DAILY
Status: DISCONTINUED | OUTPATIENT
Start: 2019-10-18 | End: 2019-10-22 | Stop reason: HOSPADM

## 2019-10-18 RX ORDER — SODIUM CHLORIDE 0.9 % (FLUSH) 0.9 %
10 SYRINGE (ML) INJECTION PRN
Status: DISCONTINUED | OUTPATIENT
Start: 2019-10-18 | End: 2019-10-22 | Stop reason: HOSPADM

## 2019-10-18 RX ORDER — INSULIN GLARGINE 100 [IU]/ML
18 INJECTION, SOLUTION SUBCUTANEOUS NIGHTLY
Status: DISCONTINUED | OUTPATIENT
Start: 2019-10-18 | End: 2019-10-21

## 2019-10-18 RX ORDER — INSULIN GLARGINE 100 [IU]/ML
18 INJECTION, SOLUTION SUBCUTANEOUS NIGHTLY
COMMUNITY

## 2019-10-18 RX ORDER — ASPIRIN 300 MG/1
300 SUPPOSITORY RECTAL DAILY
Status: DISCONTINUED | OUTPATIENT
Start: 2019-10-19 | End: 2019-10-18

## 2019-10-18 RX ORDER — OXYBUTYNIN CHLORIDE 5 MG/1
5 TABLET ORAL 4 TIMES DAILY
Status: DISCONTINUED | OUTPATIENT
Start: 2019-10-18 | End: 2019-10-22 | Stop reason: HOSPADM

## 2019-10-18 RX ORDER — DEXTROSE MONOHYDRATE 25 G/50ML
12.5 INJECTION, SOLUTION INTRAVENOUS PRN
Status: DISCONTINUED | OUTPATIENT
Start: 2019-10-18 | End: 2019-10-22 | Stop reason: HOSPADM

## 2019-10-18 RX ADMIN — Medication 10 ML: at 08:26

## 2019-10-18 RX ADMIN — ATORVASTATIN CALCIUM 80 MG: 80 TABLET, FILM COATED ORAL at 21:48

## 2019-10-18 RX ADMIN — INSULIN GLARGINE 18 UNITS: 100 INJECTION, SOLUTION SUBCUTANEOUS at 21:48

## 2019-10-18 RX ADMIN — OXYBUTYNIN CHLORIDE 5 MG: 5 TABLET ORAL at 21:48

## 2019-10-18 RX ADMIN — BUDESONIDE 500 MCG: 0.5 SUSPENSION RESPIRATORY (INHALATION) at 04:22

## 2019-10-18 RX ADMIN — ISOSORBIDE MONONITRATE 30 MG: 30 TABLET, EXTENDED RELEASE ORAL at 08:26

## 2019-10-18 RX ADMIN — INSULIN LISPRO 4 UNITS: 100 INJECTION, SOLUTION INTRAVENOUS; SUBCUTANEOUS at 13:11

## 2019-10-18 RX ADMIN — LISINOPRIL 5 MG: 5 TABLET ORAL at 08:26

## 2019-10-18 RX ADMIN — OXYBUTYNIN CHLORIDE 5 MG: 5 TABLET ORAL at 13:12

## 2019-10-18 RX ADMIN — OXYBUTYNIN CHLORIDE 5 MG: 5 TABLET ORAL at 08:26

## 2019-10-18 RX ADMIN — ATORVASTATIN CALCIUM 80 MG: 80 TABLET, FILM COATED ORAL at 03:55

## 2019-10-18 RX ADMIN — BUDESONIDE 500 MCG: 0.5 SUSPENSION RESPIRATORY (INHALATION) at 15:28

## 2019-10-18 RX ADMIN — OXYBUTYNIN CHLORIDE 5 MG: 5 TABLET ORAL at 17:27

## 2019-10-18 RX ADMIN — HYDROCHLOROTHIAZIDE 12.5 MG: 12.5 TABLET ORAL at 08:26

## 2019-10-18 SDOH — ECONOMIC STABILITY: TRANSPORTATION INSECURITY
IN THE PAST 12 MONTHS, HAS THE LACK OF TRANSPORTATION KEPT YOU FROM MEDICAL APPOINTMENTS OR FROM GETTING MEDICATIONS?: NO

## 2019-10-18 SDOH — ECONOMIC STABILITY: FOOD INSECURITY: WITHIN THE PAST 12 MONTHS, YOU WORRIED THAT YOUR FOOD WOULD RUN OUT BEFORE YOU GOT MONEY TO BUY MORE.: NEVER TRUE

## 2019-10-18 SDOH — SOCIAL STABILITY: SOCIAL INSECURITY: WITHIN THE LAST YEAR, HAVE YOU BEEN HUMILIATED OR EMOTIONALLY ABUSED IN OTHER WAYS BY YOUR PARTNER OR EX-PARTNER?: NO

## 2019-10-18 SDOH — SOCIAL STABILITY: SOCIAL NETWORK: HOW OFTEN DO YOU GET TOGETHER WITH FRIENDS OR RELATIVES?: MORE THAN THREE TIMES A WEEK

## 2019-10-18 SDOH — SOCIAL STABILITY: SOCIAL INSECURITY
WITHIN THE LAST YEAR, HAVE TO BEEN RAPED OR FORCED TO HAVE ANY KIND OF SEXUAL ACTIVITY BY YOUR PARTNER OR EX-PARTNER?: NO

## 2019-10-18 SDOH — SOCIAL STABILITY: SOCIAL NETWORK
IN A TYPICAL WEEK, HOW MANY TIMES DO YOU TALK ON THE PHONE WITH FAMILY, FRIENDS, OR NEIGHBORS?: MORE THAN THREE TIMES A WEEK

## 2019-10-18 SDOH — SOCIAL STABILITY: SOCIAL INSECURITY
WITHIN THE LAST YEAR, HAVE YOU BEEN KICKED, HIT, SLAPPED, OR OTHERWISE PHYSICALLY HURT BY YOUR PARTNER OR EX-PARTNER?: NO

## 2019-10-18 SDOH — SOCIAL STABILITY: SOCIAL NETWORK: HOW OFTEN DO YOU ATTEND CHURCH OR RELIGIOUS SERVICES?: 1 TO 4 TIMES PER YEAR

## 2019-10-18 SDOH — SOCIAL STABILITY: SOCIAL NETWORK: HOW OFTEN DO YOU ATTENT MEETINGS OF THE CLUB OR ORGANIZATION YOU BELONG TO?: NEVER

## 2019-10-18 SDOH — ECONOMIC STABILITY: INCOME INSECURITY: HOW HARD IS IT FOR YOU TO PAY FOR THE VERY BASICS LIKE FOOD, HOUSING, MEDICAL CARE, AND HEATING?: NOT VERY HARD

## 2019-10-18 SDOH — ECONOMIC STABILITY: FOOD INSECURITY: WITHIN THE PAST 12 MONTHS, THE FOOD YOU BOUGHT JUST DIDN'T LAST AND YOU DIDN'T HAVE MONEY TO GET MORE.: NEVER TRUE

## 2019-10-18 SDOH — SOCIAL STABILITY: SOCIAL NETWORK: ARE YOU MARRIED, WIDOWED, DIVORCED, SEPARATED, NEVER MARRIED, OR LIVING WITH A PARTNER?: DIVORCED

## 2019-10-18 SDOH — SOCIAL STABILITY: SOCIAL NETWORK
DO YOU BELONG TO ANY CLUBS OR ORGANIZATIONS SUCH AS CHURCH GROUPS UNIONS, FRATERNAL OR ATHLETIC GROUPS, OR SCHOOL GROUPS?: NO

## 2019-10-18 SDOH — ECONOMIC STABILITY: TRANSPORTATION INSECURITY
IN THE PAST 12 MONTHS, HAS LACK OF TRANSPORTATION KEPT YOU FROM MEETINGS, WORK, OR FROM GETTING THINGS NEEDED FOR DAILY LIVING?: NO

## 2019-10-18 SDOH — SOCIAL STABILITY: SOCIAL INSECURITY: WITHIN THE LAST YEAR, HAVE YOU BEEN AFRAID OF YOUR PARTNER OR EX-PARTNER?: NO

## 2019-10-18 ASSESSMENT — ENCOUNTER SYMPTOMS
VOMITING: 0
DIARRHEA: 0
CHOKING: 0
PHOTOPHOBIA: 0
ABDOMINAL PAIN: 0
SHORTNESS OF BREATH: 1
BACK PAIN: 1
STRIDOR: 0
BLOOD IN STOOL: 0
TROUBLE SWALLOWING: 0
SORE THROAT: 0
EYE PAIN: 0
COUGH: 0
BACK PAIN: 0
WHEEZING: 0
NAUSEA: 0
EYE REDNESS: 0
CONSTIPATION: 1
SHORTNESS OF BREATH: 0

## 2019-10-18 ASSESSMENT — PAIN SCALES - GENERAL
PAINLEVEL_OUTOF10: 0

## 2019-10-19 ENCOUNTER — APPOINTMENT (OUTPATIENT)
Dept: CT IMAGING | Age: 69
DRG: 062 | End: 2019-10-19
Payer: MEDICARE

## 2019-10-19 LAB
CHOLESTEROL, TOTAL: 129 MG/DL (ref 0–199)
GLUCOSE BLD-MCNC: 122 MG/DL (ref 60–115)
GLUCOSE BLD-MCNC: 136 MG/DL (ref 60–115)
GLUCOSE BLD-MCNC: 193 MG/DL (ref 60–115)
GLUCOSE BLD-MCNC: 225 MG/DL (ref 60–115)
HBA1C MFR BLD: 8.1 % (ref 4.8–5.9)
HCT VFR BLD CALC: 39.2 % (ref 37–47)
HDLC SERPL-MCNC: 43 MG/DL (ref 40–59)
HEMOGLOBIN: 12.8 G/DL (ref 12–16)
LDL CHOLESTEROL CALCULATED: 66 MG/DL (ref 0–129)
MCH RBC QN AUTO: 29.3 PG (ref 27–31.3)
MCHC RBC AUTO-ENTMCNC: 32.7 % (ref 33–37)
MCV RBC AUTO: 89.5 FL (ref 82–100)
PDW BLD-RTO: 14 % (ref 11.5–14.5)
PERFORMED ON: ABNORMAL
PLATELET # BLD: 239 K/UL (ref 130–400)
RBC # BLD: 4.38 M/UL (ref 4.2–5.4)
TRIGL SERPL-MCNC: 99 MG/DL (ref 0–150)
WBC # BLD: 9.2 K/UL (ref 4.8–10.8)

## 2019-10-19 PROCEDURE — 80061 LIPID PANEL: CPT

## 2019-10-19 PROCEDURE — 6360000002 HC RX W HCPCS: Performed by: HOSPITALIST

## 2019-10-19 PROCEDURE — 85027 COMPLETE CBC AUTOMATED: CPT

## 2019-10-19 PROCEDURE — 1210000000 HC MED SURG R&B

## 2019-10-19 PROCEDURE — 6370000000 HC RX 637 (ALT 250 FOR IP): Performed by: HOSPITALIST

## 2019-10-19 PROCEDURE — 83036 HEMOGLOBIN GLYCOSYLATED A1C: CPT

## 2019-10-19 PROCEDURE — 94640 AIRWAY INHALATION TREATMENT: CPT

## 2019-10-19 PROCEDURE — 99233 SBSQ HOSP IP/OBS HIGH 50: CPT | Performed by: PSYCHIATRY & NEUROLOGY

## 2019-10-19 PROCEDURE — 2580000003 HC RX 258: Performed by: HOSPITALIST

## 2019-10-19 PROCEDURE — 36415 COLL VENOUS BLD VENIPUNCTURE: CPT

## 2019-10-19 PROCEDURE — 97166 OT EVAL MOD COMPLEX 45 MIN: CPT

## 2019-10-19 PROCEDURE — 70450 CT HEAD/BRAIN W/O DYE: CPT

## 2019-10-19 PROCEDURE — 97163 PT EVAL HIGH COMPLEX 45 MIN: CPT

## 2019-10-19 RX ADMIN — IPRATROPIUM BROMIDE AND ALBUTEROL SULFATE 3 ML: .5; 3 SOLUTION RESPIRATORY (INHALATION) at 08:22

## 2019-10-19 RX ADMIN — IPRATROPIUM BROMIDE AND ALBUTEROL SULFATE 3 ML: .5; 3 SOLUTION RESPIRATORY (INHALATION) at 18:59

## 2019-10-19 RX ADMIN — INSULIN LISPRO 4 UNITS: 100 INJECTION, SOLUTION INTRAVENOUS; SUBCUTANEOUS at 17:34

## 2019-10-19 RX ADMIN — ISOSORBIDE MONONITRATE 30 MG: 30 TABLET, EXTENDED RELEASE ORAL at 11:36

## 2019-10-19 RX ADMIN — BUDESONIDE 500 MCG: 0.5 SUSPENSION RESPIRATORY (INHALATION) at 08:22

## 2019-10-19 RX ADMIN — BUDESONIDE 500 MCG: 0.5 SUSPENSION RESPIRATORY (INHALATION) at 18:58

## 2019-10-19 RX ADMIN — ASPIRIN 81 MG: 81 TABLET, COATED ORAL at 11:36

## 2019-10-19 RX ADMIN — INSULIN GLARGINE 18 UNITS: 100 INJECTION, SOLUTION SUBCUTANEOUS at 23:19

## 2019-10-19 RX ADMIN — OXYBUTYNIN CHLORIDE 5 MG: 5 TABLET ORAL at 17:34

## 2019-10-19 RX ADMIN — OXYBUTYNIN CHLORIDE 5 MG: 5 TABLET ORAL at 20:57

## 2019-10-19 RX ADMIN — ATORVASTATIN CALCIUM 80 MG: 80 TABLET, FILM COATED ORAL at 20:57

## 2019-10-19 RX ADMIN — ENOXAPARIN SODIUM 40 MG: 40 INJECTION SUBCUTANEOUS at 11:36

## 2019-10-19 RX ADMIN — Medication 10 ML: at 20:57

## 2019-10-19 RX ADMIN — OXYBUTYNIN CHLORIDE 5 MG: 5 TABLET ORAL at 11:36

## 2019-10-19 RX ADMIN — LISINOPRIL 5 MG: 5 TABLET ORAL at 11:37

## 2019-10-19 RX ADMIN — INSULIN LISPRO 2 UNITS: 100 INJECTION, SOLUTION INTRAVENOUS; SUBCUTANEOUS at 13:56

## 2019-10-19 RX ADMIN — HYDROCHLOROTHIAZIDE 12.5 MG: 12.5 TABLET ORAL at 11:36

## 2019-10-20 LAB
GLUCOSE BLD-MCNC: 102 MG/DL (ref 60–115)
GLUCOSE BLD-MCNC: 142 MG/DL (ref 60–115)
GLUCOSE BLD-MCNC: 221 MG/DL (ref 60–115)
GLUCOSE BLD-MCNC: 84 MG/DL (ref 60–115)
ORGANISM: ABNORMAL
PERFORMED ON: ABNORMAL
PERFORMED ON: ABNORMAL
PERFORMED ON: NORMAL
PERFORMED ON: NORMAL
URINE CULTURE, ROUTINE: ABNORMAL

## 2019-10-20 PROCEDURE — 6370000000 HC RX 637 (ALT 250 FOR IP): Performed by: HOSPITALIST

## 2019-10-20 PROCEDURE — 6360000002 HC RX W HCPCS: Performed by: HOSPITALIST

## 2019-10-20 PROCEDURE — 99232 SBSQ HOSP IP/OBS MODERATE 35: CPT | Performed by: PHYSICAL MEDICINE & REHABILITATION

## 2019-10-20 PROCEDURE — 94640 AIRWAY INHALATION TREATMENT: CPT

## 2019-10-20 PROCEDURE — 2580000003 HC RX 258: Performed by: HOSPITALIST

## 2019-10-20 PROCEDURE — 1210000000 HC MED SURG R&B

## 2019-10-20 PROCEDURE — 99233 SBSQ HOSP IP/OBS HIGH 50: CPT | Performed by: PSYCHIATRY & NEUROLOGY

## 2019-10-20 PROCEDURE — 6370000000 HC RX 637 (ALT 250 FOR IP): Performed by: PHYSICAL MEDICINE & REHABILITATION

## 2019-10-20 PROCEDURE — 94760 N-INVAS EAR/PLS OXIMETRY 1: CPT

## 2019-10-20 RX ORDER — NITROFURANTOIN 25; 75 MG/1; MG/1
100 CAPSULE ORAL
Status: DISCONTINUED | OUTPATIENT
Start: 2019-10-20 | End: 2019-10-22 | Stop reason: HOSPADM

## 2019-10-20 RX ORDER — ACETAMINOPHEN 500 MG
500 TABLET ORAL EVERY 6 HOURS PRN
Status: DISCONTINUED | OUTPATIENT
Start: 2019-10-20 | End: 2019-10-22 | Stop reason: HOSPADM

## 2019-10-20 RX ADMIN — ENOXAPARIN SODIUM 40 MG: 40 INJECTION SUBCUTANEOUS at 09:47

## 2019-10-20 RX ADMIN — LISINOPRIL 5 MG: 5 TABLET ORAL at 09:48

## 2019-10-20 RX ADMIN — ISOSORBIDE MONONITRATE 30 MG: 30 TABLET, EXTENDED RELEASE ORAL at 09:47

## 2019-10-20 RX ADMIN — BUDESONIDE 500 MCG: 0.5 SUSPENSION RESPIRATORY (INHALATION) at 08:25

## 2019-10-20 RX ADMIN — OXYBUTYNIN CHLORIDE 5 MG: 5 TABLET ORAL at 09:48

## 2019-10-20 RX ADMIN — Medication 10 ML: at 09:48

## 2019-10-20 RX ADMIN — IPRATROPIUM BROMIDE AND ALBUTEROL SULFATE 3 ML: .5; 3 SOLUTION RESPIRATORY (INHALATION) at 19:47

## 2019-10-20 RX ADMIN — OXYBUTYNIN CHLORIDE 5 MG: 5 TABLET ORAL at 15:07

## 2019-10-20 RX ADMIN — ASPIRIN 81 MG: 81 TABLET, COATED ORAL at 09:47

## 2019-10-20 RX ADMIN — OXYBUTYNIN CHLORIDE 5 MG: 5 TABLET ORAL at 20:58

## 2019-10-20 RX ADMIN — HYDROCHLOROTHIAZIDE 12.5 MG: 12.5 TABLET ORAL at 09:47

## 2019-10-20 RX ADMIN — BUDESONIDE 500 MCG: 0.5 SUSPENSION RESPIRATORY (INHALATION) at 19:47

## 2019-10-20 RX ADMIN — INSULIN LISPRO 4 UNITS: 100 INJECTION, SOLUTION INTRAVENOUS; SUBCUTANEOUS at 15:14

## 2019-10-20 RX ADMIN — NITROFURANTOIN (MONOHYDRATE/MACROCRYSTALS) 100 MG: 75; 25 CAPSULE ORAL at 21:19

## 2019-10-20 RX ADMIN — INSULIN GLARGINE 18 UNITS: 100 INJECTION, SOLUTION SUBCUTANEOUS at 20:58

## 2019-10-20 RX ADMIN — ATORVASTATIN CALCIUM 80 MG: 80 TABLET, FILM COATED ORAL at 20:58

## 2019-10-20 RX ADMIN — IPRATROPIUM BROMIDE AND ALBUTEROL SULFATE 3 ML: .5; 3 SOLUTION RESPIRATORY (INHALATION) at 08:25

## 2019-10-20 RX ADMIN — INSULIN LISPRO 4 UNITS: 100 INJECTION, SOLUTION INTRAVENOUS; SUBCUTANEOUS at 15:08

## 2019-10-21 ENCOUNTER — APPOINTMENT (OUTPATIENT)
Dept: MRI IMAGING | Age: 69
DRG: 062 | End: 2019-10-21
Payer: MEDICARE

## 2019-10-21 LAB
GLUCOSE BLD-MCNC: 141 MG/DL (ref 60–115)
GLUCOSE BLD-MCNC: 152 MG/DL (ref 60–115)
GLUCOSE BLD-MCNC: 154 MG/DL (ref 60–115)
GLUCOSE BLD-MCNC: 199 MG/DL (ref 60–115)
GLUCOSE BLD-MCNC: 245 MG/DL (ref 60–115)
PERFORMED ON: ABNORMAL

## 2019-10-21 PROCEDURE — 6370000000 HC RX 637 (ALT 250 FOR IP): Performed by: NURSE PRACTITIONER

## 2019-10-21 PROCEDURE — 1210000000 HC MED SURG R&B

## 2019-10-21 PROCEDURE — 99233 SBSQ HOSP IP/OBS HIGH 50: CPT | Performed by: PSYCHIATRY & NEUROLOGY

## 2019-10-21 PROCEDURE — 97116 GAIT TRAINING THERAPY: CPT

## 2019-10-21 PROCEDURE — 97535 SELF CARE MNGMENT TRAINING: CPT

## 2019-10-21 PROCEDURE — 94640 AIRWAY INHALATION TREATMENT: CPT

## 2019-10-21 PROCEDURE — 94761 N-INVAS EAR/PLS OXIMETRY MLT: CPT

## 2019-10-21 PROCEDURE — 6360000002 HC RX W HCPCS: Performed by: HOSPITALIST

## 2019-10-21 PROCEDURE — 6370000000 HC RX 637 (ALT 250 FOR IP): Performed by: HOSPITALIST

## 2019-10-21 PROCEDURE — 6370000000 HC RX 637 (ALT 250 FOR IP): Performed by: PHYSICAL MEDICINE & REHABILITATION

## 2019-10-21 PROCEDURE — 6370000000 HC RX 637 (ALT 250 FOR IP): Performed by: INTERNAL MEDICINE

## 2019-10-21 PROCEDURE — 70551 MRI BRAIN STEM W/O DYE: CPT

## 2019-10-21 PROCEDURE — 2580000003 HC RX 258: Performed by: HOSPITALIST

## 2019-10-21 PROCEDURE — 99232 SBSQ HOSP IP/OBS MODERATE 35: CPT | Performed by: PHYSICAL MEDICINE & REHABILITATION

## 2019-10-21 RX ORDER — SODIUM CHLORIDE 0.9 % (FLUSH) 0.9 %
10 SYRINGE (ML) INJECTION PRN
Status: CANCELLED | OUTPATIENT
Start: 2019-10-21

## 2019-10-21 RX ORDER — HYDROCHLOROTHIAZIDE 12.5 MG/1
12.5 TABLET ORAL DAILY
Status: CANCELLED | OUTPATIENT
Start: 2019-10-22

## 2019-10-21 RX ORDER — IPRATROPIUM BROMIDE AND ALBUTEROL SULFATE 2.5; .5 MG/3ML; MG/3ML
3 SOLUTION RESPIRATORY (INHALATION) EVERY 4 HOURS PRN
Status: CANCELLED | OUTPATIENT
Start: 2019-10-21

## 2019-10-21 RX ORDER — ATORVASTATIN CALCIUM 80 MG/1
80 TABLET, FILM COATED ORAL NIGHTLY
Status: CANCELLED | OUTPATIENT
Start: 2019-10-21

## 2019-10-21 RX ORDER — LISINOPRIL 5 MG/1
5 TABLET ORAL DAILY
Status: CANCELLED | OUTPATIENT
Start: 2019-10-22

## 2019-10-21 RX ORDER — NICOTINE POLACRILEX 4 MG
15 LOZENGE BUCCAL PRN
Status: CANCELLED | OUTPATIENT
Start: 2019-10-21

## 2019-10-21 RX ORDER — ACETAMINOPHEN 500 MG
500 TABLET ORAL EVERY 6 HOURS PRN
Status: CANCELLED | OUTPATIENT
Start: 2019-10-21

## 2019-10-21 RX ORDER — LABETALOL 20 MG/4 ML (5 MG/ML) INTRAVENOUS SYRINGE
10 EVERY 10 MIN PRN
Status: CANCELLED | OUTPATIENT
Start: 2019-10-21

## 2019-10-21 RX ORDER — ONDANSETRON 2 MG/ML
4 INJECTION INTRAMUSCULAR; INTRAVENOUS EVERY 6 HOURS PRN
Status: CANCELLED | OUTPATIENT
Start: 2019-10-21

## 2019-10-21 RX ORDER — CLOPIDOGREL BISULFATE 75 MG/1
75 TABLET ORAL DAILY
Qty: 30 TABLET | Refills: 3 | COMMUNITY
Start: 2019-10-22

## 2019-10-21 RX ORDER — ASPIRIN 81 MG/1
162 TABLET, CHEWABLE ORAL DAILY
Status: DISCONTINUED | OUTPATIENT
Start: 2019-10-21 | End: 2019-10-21

## 2019-10-21 RX ORDER — ISOSORBIDE MONONITRATE 30 MG/1
30 TABLET, EXTENDED RELEASE ORAL DAILY
Status: CANCELLED | OUTPATIENT
Start: 2019-10-22

## 2019-10-21 RX ORDER — CLOPIDOGREL BISULFATE 75 MG/1
75 TABLET ORAL DAILY
Status: CANCELLED | OUTPATIENT
Start: 2019-10-22

## 2019-10-21 RX ORDER — ASPIRIN 81 MG/1
81 TABLET, CHEWABLE ORAL DAILY
Status: DISCONTINUED | OUTPATIENT
Start: 2019-10-21 | End: 2019-10-22 | Stop reason: HOSPADM

## 2019-10-21 RX ORDER — DEXTROSE MONOHYDRATE 50 MG/ML
100 INJECTION, SOLUTION INTRAVENOUS PRN
Status: CANCELLED | OUTPATIENT
Start: 2019-10-21

## 2019-10-21 RX ORDER — INSULIN GLARGINE 100 [IU]/ML
21 INJECTION, SOLUTION SUBCUTANEOUS NIGHTLY
Status: CANCELLED | OUTPATIENT
Start: 2019-10-21

## 2019-10-21 RX ORDER — CLOPIDOGREL BISULFATE 75 MG/1
75 TABLET ORAL DAILY
Status: DISCONTINUED | OUTPATIENT
Start: 2019-10-21 | End: 2019-10-22 | Stop reason: HOSPADM

## 2019-10-21 RX ORDER — ATORVASTATIN CALCIUM 80 MG/1
80 TABLET, FILM COATED ORAL NIGHTLY
Qty: 30 TABLET | Refills: 3 | COMMUNITY
Start: 2019-10-21

## 2019-10-21 RX ORDER — BUDESONIDE 0.5 MG/2ML
0.5 INHALANT ORAL 2 TIMES DAILY
Status: CANCELLED | OUTPATIENT
Start: 2019-10-21

## 2019-10-21 RX ORDER — DEXTROSE MONOHYDRATE 25 G/50ML
12.5 INJECTION, SOLUTION INTRAVENOUS PRN
Status: CANCELLED | OUTPATIENT
Start: 2019-10-21

## 2019-10-21 RX ORDER — SODIUM CHLORIDE 0.9 % (FLUSH) 0.9 %
10 SYRINGE (ML) INJECTION EVERY 12 HOURS SCHEDULED
Status: CANCELLED | OUTPATIENT
Start: 2019-10-21

## 2019-10-21 RX ORDER — ASPIRIN 81 MG/1
81 TABLET, CHEWABLE ORAL DAILY
Status: CANCELLED | OUTPATIENT
Start: 2019-10-22

## 2019-10-21 RX ORDER — NITROFURANTOIN 25; 75 MG/1; MG/1
100 CAPSULE ORAL
Status: CANCELLED | OUTPATIENT
Start: 2019-10-21 | End: 2019-10-25

## 2019-10-21 RX ORDER — INSULIN GLARGINE 100 [IU]/ML
21 INJECTION, SOLUTION SUBCUTANEOUS NIGHTLY
Status: DISCONTINUED | OUTPATIENT
Start: 2019-10-21 | End: 2019-10-22 | Stop reason: HOSPADM

## 2019-10-21 RX ADMIN — Medication 10 ML: at 22:17

## 2019-10-21 RX ADMIN — OXYBUTYNIN CHLORIDE 5 MG: 5 TABLET ORAL at 17:12

## 2019-10-21 RX ADMIN — ISOSORBIDE MONONITRATE 30 MG: 30 TABLET, EXTENDED RELEASE ORAL at 09:11

## 2019-10-21 RX ADMIN — OXYBUTYNIN CHLORIDE 5 MG: 5 TABLET ORAL at 09:11

## 2019-10-21 RX ADMIN — ASPIRIN 81 MG 81 MG: 81 TABLET ORAL at 12:42

## 2019-10-21 RX ADMIN — INSULIN GLARGINE 21 UNITS: 100 INJECTION, SOLUTION SUBCUTANEOUS at 21:23

## 2019-10-21 RX ADMIN — LISINOPRIL 5 MG: 5 TABLET ORAL at 09:11

## 2019-10-21 RX ADMIN — ATORVASTATIN CALCIUM 80 MG: 80 TABLET, FILM COATED ORAL at 21:18

## 2019-10-21 RX ADMIN — IPRATROPIUM BROMIDE AND ALBUTEROL SULFATE 3 ML: .5; 3 SOLUTION RESPIRATORY (INHALATION) at 07:21

## 2019-10-21 RX ADMIN — OXYBUTYNIN CHLORIDE 5 MG: 5 TABLET ORAL at 21:18

## 2019-10-21 RX ADMIN — Medication 10 ML: at 00:04

## 2019-10-21 RX ADMIN — CLOPIDOGREL BISULFATE 75 MG: 75 TABLET ORAL at 12:42

## 2019-10-21 RX ADMIN — INSULIN LISPRO 2 UNITS: 100 INJECTION, SOLUTION INTRAVENOUS; SUBCUTANEOUS at 12:43

## 2019-10-21 RX ADMIN — NITROFURANTOIN (MONOHYDRATE/MACROCRYSTALS) 100 MG: 75; 25 CAPSULE ORAL at 05:57

## 2019-10-21 RX ADMIN — HYDROCHLOROTHIAZIDE 12.5 MG: 12.5 TABLET ORAL at 10:07

## 2019-10-21 RX ADMIN — IPRATROPIUM BROMIDE AND ALBUTEROL SULFATE 3 ML: .5; 3 SOLUTION RESPIRATORY (INHALATION) at 19:48

## 2019-10-21 RX ADMIN — BUDESONIDE 500 MCG: 0.5 SUSPENSION RESPIRATORY (INHALATION) at 19:48

## 2019-10-21 RX ADMIN — Medication 10 ML: at 09:12

## 2019-10-21 RX ADMIN — ENOXAPARIN SODIUM 40 MG: 40 INJECTION SUBCUTANEOUS at 09:11

## 2019-10-21 RX ADMIN — OXYBUTYNIN CHLORIDE 5 MG: 5 TABLET ORAL at 12:42

## 2019-10-21 RX ADMIN — INSULIN LISPRO 4 UNITS: 100 INJECTION, SOLUTION INTRAVENOUS; SUBCUTANEOUS at 09:10

## 2019-10-21 RX ADMIN — INSULIN LISPRO 2 UNITS: 100 INJECTION, SOLUTION INTRAVENOUS; SUBCUTANEOUS at 17:17

## 2019-10-21 RX ADMIN — BUDESONIDE 500 MCG: 0.5 SUSPENSION RESPIRATORY (INHALATION) at 07:21

## 2019-10-21 RX ADMIN — NITROFURANTOIN (MONOHYDRATE/MACROCRYSTALS) 100 MG: 75; 25 CAPSULE ORAL at 17:12

## 2019-10-21 ASSESSMENT — ENCOUNTER SYMPTOMS
CONSTIPATION: 0
ABDOMINAL DISTENTION: 0
SHORTNESS OF BREATH: 0
VOMITING: 0
CHEST TIGHTNESS: 0
ABDOMINAL PAIN: 0
DIARRHEA: 0
COUGH: 0
COLOR CHANGE: 0
TROUBLE SWALLOWING: 0
WHEEZING: 0
NAUSEA: 0

## 2019-10-21 ASSESSMENT — PAIN SCALES - GENERAL: PAINLEVEL_OUTOF10: 0

## 2019-10-22 ENCOUNTER — HOSPITAL ENCOUNTER (INPATIENT)
Age: 69
LOS: 21 days | Discharge: HOME HEALTH | DRG: 057 | End: 2019-11-12
Attending: PHYSICAL MEDICINE & REHABILITATION | Admitting: PHYSICAL MEDICINE & REHABILITATION
Payer: MEDICARE

## 2019-10-22 VITALS
BODY MASS INDEX: 35.62 KG/M2 | OXYGEN SATURATION: 94 % | HEART RATE: 98 BPM | TEMPERATURE: 98.4 F | RESPIRATION RATE: 16 BRPM | WEIGHT: 201.06 LBS | HEIGHT: 63 IN | SYSTOLIC BLOOD PRESSURE: 152 MMHG | DIASTOLIC BLOOD PRESSURE: 73 MMHG

## 2019-10-22 PROBLEM — E66.9 OBESITY (BMI 30-39.9): Status: ACTIVE | Noted: 2019-10-22

## 2019-10-22 PROBLEM — G89.29 CHRONIC PAIN: Status: ACTIVE | Noted: 2019-10-22

## 2019-10-22 PROBLEM — R26.9 ABNORMALITY OF GAIT AND MOBILITY: Status: ACTIVE | Noted: 2019-10-22

## 2019-10-22 PROBLEM — I25.3 ATRIAL SEPTAL ANEURYSM: Status: ACTIVE | Noted: 2019-01-01

## 2019-10-22 PROBLEM — M19.90 OSTEOARTHRITIS: Status: ACTIVE | Noted: 2019-10-22

## 2019-10-22 LAB
ANION GAP SERPL CALCULATED.3IONS-SCNC: 13 MEQ/L (ref 9–15)
BACTERIA: NEGATIVE /HPF
BASOPHILS ABSOLUTE: 0.1 K/UL (ref 0–0.2)
BASOPHILS RELATIVE PERCENT: 0.7 %
BILIRUBIN URINE: NEGATIVE
BLOOD, URINE: ABNORMAL
BUN BLDV-MCNC: 13 MG/DL (ref 8–23)
CALCIUM SERPL-MCNC: 9.2 MG/DL (ref 8.5–9.9)
CHLORIDE BLD-SCNC: 97 MEQ/L (ref 95–107)
CLARITY: ABNORMAL
CO2: 25 MEQ/L (ref 20–31)
COLOR: YELLOW
CREAT SERPL-MCNC: 0.67 MG/DL (ref 0.5–0.9)
EOSINOPHILS ABSOLUTE: 0.4 K/UL (ref 0–0.7)
EOSINOPHILS RELATIVE PERCENT: 4.8 %
EPITHELIAL CELLS, UA: ABNORMAL /HPF (ref 0–5)
GFR AFRICAN AMERICAN: >60
GFR NON-AFRICAN AMERICAN: >60
GLUCOSE BLD-MCNC: 131 MG/DL (ref 60–115)
GLUCOSE BLD-MCNC: 138 MG/DL (ref 70–99)
GLUCOSE BLD-MCNC: 143 MG/DL (ref 60–115)
GLUCOSE BLD-MCNC: 150 MG/DL (ref 60–115)
GLUCOSE BLD-MCNC: 231 MG/DL (ref 60–115)
GLUCOSE URINE: NEGATIVE MG/DL
HCT VFR BLD CALC: 39.1 % (ref 37–47)
HEMOGLOBIN: 13.1 G/DL (ref 12–16)
HYALINE CASTS: ABNORMAL /HPF (ref 0–5)
KETONES, URINE: NEGATIVE MG/DL
LEUKOCYTE ESTERASE, URINE: ABNORMAL
LYMPHOCYTES ABSOLUTE: 1.8 K/UL (ref 1–4.8)
LYMPHOCYTES RELATIVE PERCENT: 20.8 %
MCH RBC QN AUTO: 29.8 PG (ref 27–31.3)
MCHC RBC AUTO-ENTMCNC: 33.6 % (ref 33–37)
MCV RBC AUTO: 88.7 FL (ref 82–100)
MONOCYTES ABSOLUTE: 0.9 K/UL (ref 0.2–0.8)
MONOCYTES RELATIVE PERCENT: 10.6 %
NEUTROPHILS ABSOLUTE: 5.3 K/UL (ref 1.4–6.5)
NEUTROPHILS RELATIVE PERCENT: 63.1 %
NITRITE, URINE: NEGATIVE
PDW BLD-RTO: 13.8 % (ref 11.5–14.5)
PERFORMED ON: ABNORMAL
PH UA: 5 (ref 5–9)
PLATELET # BLD: 263 K/UL (ref 130–400)
POTASSIUM SERPL-SCNC: 3.8 MEQ/L (ref 3.4–4.9)
PROTEIN UA: NEGATIVE MG/DL
RBC # BLD: 4.41 M/UL (ref 4.2–5.4)
RBC UA: ABNORMAL /HPF (ref 0–5)
SODIUM BLD-SCNC: 135 MEQ/L (ref 135–144)
SPECIFIC GRAVITY UA: 1 (ref 1–1.03)
UROBILINOGEN, URINE: 0.2 E.U./DL
WBC # BLD: 8.4 K/UL (ref 4.8–10.8)
WBC UA: ABNORMAL /HPF (ref 0–5)

## 2019-10-22 PROCEDURE — 87086 URINE CULTURE/COLONY COUNT: CPT

## 2019-10-22 PROCEDURE — 97535 SELF CARE MNGMENT TRAINING: CPT

## 2019-10-22 PROCEDURE — 2580000003 HC RX 258: Performed by: HOSPITALIST

## 2019-10-22 PROCEDURE — 94664 DEMO&/EVAL PT USE INHALER: CPT

## 2019-10-22 PROCEDURE — 6360000002 HC RX W HCPCS: Performed by: HOSPITALIST

## 2019-10-22 PROCEDURE — 85025 COMPLETE CBC W/AUTO DIFF WBC: CPT

## 2019-10-22 PROCEDURE — 81001 URINALYSIS AUTO W/SCOPE: CPT

## 2019-10-22 PROCEDURE — 99233 SBSQ HOSP IP/OBS HIGH 50: CPT | Performed by: PSYCHIATRY & NEUROLOGY

## 2019-10-22 PROCEDURE — 2580000003 HC RX 258: Performed by: INTERNAL MEDICINE

## 2019-10-22 PROCEDURE — 6370000000 HC RX 637 (ALT 250 FOR IP): Performed by: PHYSICAL MEDICINE & REHABILITATION

## 2019-10-22 PROCEDURE — 6370000000 HC RX 637 (ALT 250 FOR IP): Performed by: INTERNAL MEDICINE

## 2019-10-22 PROCEDURE — 36415 COLL VENOUS BLD VENIPUNCTURE: CPT

## 2019-10-22 PROCEDURE — 1180000000 HC REHAB R&B

## 2019-10-22 PROCEDURE — 6370000000 HC RX 637 (ALT 250 FOR IP): Performed by: NURSE PRACTITIONER

## 2019-10-22 PROCEDURE — 80048 BASIC METABOLIC PNL TOTAL CA: CPT

## 2019-10-22 PROCEDURE — 94640 AIRWAY INHALATION TREATMENT: CPT

## 2019-10-22 PROCEDURE — 6370000000 HC RX 637 (ALT 250 FOR IP): Performed by: HOSPITALIST

## 2019-10-22 PROCEDURE — 99232 SBSQ HOSP IP/OBS MODERATE 35: CPT | Performed by: PHYSICAL MEDICINE & REHABILITATION

## 2019-10-22 RX ORDER — ASPIRIN 81 MG/1
81 TABLET, CHEWABLE ORAL DAILY
Status: DISCONTINUED | OUTPATIENT
Start: 2019-10-23 | End: 2019-11-12 | Stop reason: HOSPADM

## 2019-10-22 RX ORDER — LABETALOL 20 MG/4 ML (5 MG/ML) INTRAVENOUS SYRINGE
10 EVERY 10 MIN PRN
Status: DISCONTINUED | OUTPATIENT
Start: 2019-10-22 | End: 2019-11-12 | Stop reason: HOSPADM

## 2019-10-22 RX ORDER — DEXTROSE MONOHYDRATE 25 G/50ML
12.5 INJECTION, SOLUTION INTRAVENOUS PRN
Status: DISCONTINUED | OUTPATIENT
Start: 2019-10-22 | End: 2019-11-12 | Stop reason: HOSPADM

## 2019-10-22 RX ORDER — NITROFURANTOIN 25; 75 MG/1; MG/1
100 CAPSULE ORAL
Status: COMPLETED | OUTPATIENT
Start: 2019-10-23 | End: 2019-10-26

## 2019-10-22 RX ORDER — ISOSORBIDE MONONITRATE 30 MG/1
30 TABLET, EXTENDED RELEASE ORAL DAILY
Status: DISCONTINUED | OUTPATIENT
Start: 2019-10-23 | End: 2019-11-12 | Stop reason: HOSPADM

## 2019-10-22 RX ORDER — IPRATROPIUM BROMIDE AND ALBUTEROL SULFATE 2.5; .5 MG/3ML; MG/3ML
3 SOLUTION RESPIRATORY (INHALATION) EVERY 4 HOURS PRN
Status: DISCONTINUED | OUTPATIENT
Start: 2019-10-22 | End: 2019-11-12 | Stop reason: HOSPADM

## 2019-10-22 RX ORDER — INSULIN GLARGINE 100 [IU]/ML
21 INJECTION, SOLUTION SUBCUTANEOUS NIGHTLY
Status: DISCONTINUED | OUTPATIENT
Start: 2019-10-22 | End: 2019-11-07

## 2019-10-22 RX ORDER — DEXTROSE MONOHYDRATE 50 MG/ML
100 INJECTION, SOLUTION INTRAVENOUS PRN
Status: DISCONTINUED | OUTPATIENT
Start: 2019-10-22 | End: 2019-11-12 | Stop reason: HOSPADM

## 2019-10-22 RX ORDER — NICOTINE POLACRILEX 4 MG
15 LOZENGE BUCCAL PRN
Status: DISCONTINUED | OUTPATIENT
Start: 2019-10-22 | End: 2019-11-12 | Stop reason: HOSPADM

## 2019-10-22 RX ORDER — CLOPIDOGREL BISULFATE 75 MG/1
75 TABLET ORAL DAILY
Status: DISCONTINUED | OUTPATIENT
Start: 2019-10-23 | End: 2019-11-12 | Stop reason: HOSPADM

## 2019-10-22 RX ORDER — ONDANSETRON 2 MG/ML
4 INJECTION INTRAMUSCULAR; INTRAVENOUS EVERY 6 HOURS PRN
Status: DISCONTINUED | OUTPATIENT
Start: 2019-10-22 | End: 2019-11-12 | Stop reason: HOSPADM

## 2019-10-22 RX ORDER — LISINOPRIL 2.5 MG/1
5 TABLET ORAL DAILY
Status: DISCONTINUED | OUTPATIENT
Start: 2019-10-23 | End: 2019-11-12 | Stop reason: HOSPADM

## 2019-10-22 RX ORDER — ACETAMINOPHEN 500 MG
500 TABLET ORAL EVERY 6 HOURS PRN
Status: DISCONTINUED | OUTPATIENT
Start: 2019-10-22 | End: 2019-11-12 | Stop reason: HOSPADM

## 2019-10-22 RX ORDER — SODIUM CHLORIDE 0.9 % (FLUSH) 0.9 %
10 SYRINGE (ML) INJECTION EVERY 12 HOURS SCHEDULED
Status: DISCONTINUED | OUTPATIENT
Start: 2019-10-22 | End: 2019-10-25

## 2019-10-22 RX ORDER — HYDROCHLOROTHIAZIDE 25 MG/1
12.5 TABLET ORAL DAILY
Status: DISCONTINUED | OUTPATIENT
Start: 2019-10-23 | End: 2019-11-12 | Stop reason: HOSPADM

## 2019-10-22 RX ORDER — BUDESONIDE 0.5 MG/2ML
0.5 INHALANT ORAL 2 TIMES DAILY
Status: CANCELLED | OUTPATIENT
Start: 2019-10-22

## 2019-10-22 RX ORDER — ATORVASTATIN CALCIUM 80 MG/1
80 TABLET, FILM COATED ORAL NIGHTLY
Status: DISCONTINUED | OUTPATIENT
Start: 2019-10-22 | End: 2019-11-12 | Stop reason: HOSPADM

## 2019-10-22 RX ORDER — SODIUM CHLORIDE, SODIUM LACTATE, POTASSIUM CHLORIDE, AND CALCIUM CHLORIDE .6; .31; .03; .02 G/100ML; G/100ML; G/100ML; G/100ML
500 INJECTION, SOLUTION INTRAVENOUS ONCE
Status: COMPLETED | OUTPATIENT
Start: 2019-10-22 | End: 2019-10-22

## 2019-10-22 RX ORDER — SODIUM CHLORIDE 0.9 % (FLUSH) 0.9 %
10 SYRINGE (ML) INJECTION PRN
Status: DISCONTINUED | OUTPATIENT
Start: 2019-10-22 | End: 2019-11-12 | Stop reason: HOSPADM

## 2019-10-22 RX ORDER — BUDESONIDE 0.5 MG/2ML
0.5 INHALANT ORAL 2 TIMES DAILY
Status: DISCONTINUED | OUTPATIENT
Start: 2019-10-22 | End: 2019-11-12 | Stop reason: HOSPADM

## 2019-10-22 RX ADMIN — ATORVASTATIN CALCIUM 80 MG: 80 TABLET, FILM COATED ORAL at 23:05

## 2019-10-22 RX ADMIN — INSULIN LISPRO 2 UNITS: 100 INJECTION, SOLUTION INTRAVENOUS; SUBCUTANEOUS at 12:19

## 2019-10-22 RX ADMIN — LISINOPRIL 5 MG: 5 TABLET ORAL at 09:47

## 2019-10-22 RX ADMIN — OXYBUTYNIN CHLORIDE 5 MG: 5 TABLET ORAL at 12:19

## 2019-10-22 RX ADMIN — ASPIRIN 81 MG 81 MG: 81 TABLET ORAL at 09:47

## 2019-10-22 RX ADMIN — OXYBUTYNIN CHLORIDE 5 MG: 5 TABLET ORAL at 09:47

## 2019-10-22 RX ADMIN — ENOXAPARIN SODIUM 40 MG: 40 INJECTION SUBCUTANEOUS at 09:47

## 2019-10-22 RX ADMIN — Medication 10 ML: at 23:11

## 2019-10-22 RX ADMIN — BUDESONIDE 500 MCG: 0.5 SUSPENSION RESPIRATORY (INHALATION) at 07:05

## 2019-10-22 RX ADMIN — ISOSORBIDE MONONITRATE 30 MG: 30 TABLET, EXTENDED RELEASE ORAL at 09:47

## 2019-10-22 RX ADMIN — Medication 10 ML: at 09:48

## 2019-10-22 RX ADMIN — CLOPIDOGREL BISULFATE 75 MG: 75 TABLET ORAL at 09:47

## 2019-10-22 RX ADMIN — INSULIN LISPRO 2 UNITS: 100 INJECTION, SOLUTION INTRAVENOUS; SUBCUTANEOUS at 17:29

## 2019-10-22 RX ADMIN — NITROFURANTOIN (MONOHYDRATE/MACROCRYSTALS) 100 MG: 75; 25 CAPSULE ORAL at 06:37

## 2019-10-22 RX ADMIN — SODIUM CHLORIDE, POTASSIUM CHLORIDE, SODIUM LACTATE AND CALCIUM CHLORIDE 500 ML: 600; 310; 30; 20 INJECTION, SOLUTION INTRAVENOUS at 10:30

## 2019-10-22 RX ADMIN — INSULIN GLARGINE 21 UNITS: 100 INJECTION, SOLUTION SUBCUTANEOUS at 23:08

## 2019-10-22 RX ADMIN — HYDROCHLOROTHIAZIDE 12.5 MG: 12.5 TABLET ORAL at 10:02

## 2019-10-22 ASSESSMENT — ENCOUNTER SYMPTOMS
VOMITING: 0
ABDOMINAL PAIN: 0
COUGH: 0
COLOR CHANGE: 0
DIARRHEA: 0
WHEEZING: 0
CHEST TIGHTNESS: 0
CONSTIPATION: 0
TROUBLE SWALLOWING: 0
NAUSEA: 0
ABDOMINAL DISTENTION: 0
SHORTNESS OF BREATH: 0

## 2019-10-22 ASSESSMENT — PAIN DESCRIPTION - FREQUENCY: FREQUENCY: INTERMITTENT

## 2019-10-22 ASSESSMENT — PAIN DESCRIPTION - DESCRIPTORS: DESCRIPTORS: ACHING

## 2019-10-22 ASSESSMENT — PAIN SCALES - GENERAL
PAINLEVEL_OUTOF10: 0
PAINLEVEL_OUTOF10: 0

## 2019-10-22 ASSESSMENT — PAIN - FUNCTIONAL ASSESSMENT: PAIN_FUNCTIONAL_ASSESSMENT: PREVENTS OR INTERFERES SOME ACTIVE ACTIVITIES AND ADLS

## 2019-10-22 ASSESSMENT — PAIN DESCRIPTION - ONSET: ONSET: ON-GOING

## 2019-10-22 ASSESSMENT — PAIN DESCRIPTION - LOCATION: LOCATION: BACK

## 2019-10-22 ASSESSMENT — PAIN DESCRIPTION - PAIN TYPE: TYPE: CHRONIC PAIN

## 2019-10-22 ASSESSMENT — PAIN DESCRIPTION - PROGRESSION: CLINICAL_PROGRESSION: NOT CHANGED

## 2019-10-23 LAB
GLUCOSE BLD-MCNC: 162 MG/DL (ref 60–115)
GLUCOSE BLD-MCNC: 164 MG/DL (ref 60–115)
GLUCOSE BLD-MCNC: 179 MG/DL (ref 60–115)
GLUCOSE BLD-MCNC: 184 MG/DL (ref 60–115)
PERFORMED ON: ABNORMAL

## 2019-10-23 PROCEDURE — 6370000000 HC RX 637 (ALT 250 FOR IP): Performed by: PHYSICAL MEDICINE & REHABILITATION

## 2019-10-23 PROCEDURE — 6370000000 HC RX 637 (ALT 250 FOR IP): Performed by: INTERNAL MEDICINE

## 2019-10-23 PROCEDURE — 99223 1ST HOSP IP/OBS HIGH 75: CPT | Performed by: PHYSICAL MEDICINE & REHABILITATION

## 2019-10-23 PROCEDURE — 6360000002 HC RX W HCPCS: Performed by: INTERNAL MEDICINE

## 2019-10-23 PROCEDURE — 94640 AIRWAY INHALATION TREATMENT: CPT

## 2019-10-23 PROCEDURE — 97116 GAIT TRAINING THERAPY: CPT

## 2019-10-23 PROCEDURE — 1180000000 HC REHAB R&B

## 2019-10-23 PROCEDURE — 6360000002 HC RX W HCPCS: Performed by: PHYSICAL MEDICINE & REHABILITATION

## 2019-10-23 PROCEDURE — 99231 SBSQ HOSP IP/OBS SF/LOW 25: CPT | Performed by: PSYCHIATRY & NEUROLOGY

## 2019-10-23 PROCEDURE — 92523 SPEECH SOUND LANG COMPREHEN: CPT

## 2019-10-23 PROCEDURE — 97127 HC OT THER IVNTJ W/FOCUS COG FUNCJ: CPT

## 2019-10-23 PROCEDURE — 97535 SELF CARE MNGMENT TRAINING: CPT

## 2019-10-23 PROCEDURE — 97530 THERAPEUTIC ACTIVITIES: CPT

## 2019-10-23 PROCEDURE — 92610 EVALUATE SWALLOWING FUNCTION: CPT

## 2019-10-23 PROCEDURE — 97166 OT EVAL MOD COMPLEX 45 MIN: CPT

## 2019-10-23 PROCEDURE — 96125 COGNITIVE TEST BY HC PRO: CPT

## 2019-10-23 PROCEDURE — 97162 PT EVAL MOD COMPLEX 30 MIN: CPT

## 2019-10-23 RX ORDER — LIDOCAINE 4 G/G
3 PATCH TOPICAL DAILY
Status: DISCONTINUED | OUTPATIENT
Start: 2019-10-23 | End: 2019-11-04

## 2019-10-23 RX ORDER — ERGOCALCIFEROL 1.25 MG/1
50000 CAPSULE ORAL WEEKLY
Status: DISCONTINUED | OUTPATIENT
Start: 2019-10-23 | End: 2019-11-12 | Stop reason: HOSPADM

## 2019-10-23 RX ORDER — ACETAMINOPHEN 325 MG/1
650 TABLET ORAL EVERY 4 HOURS PRN
Status: DISCONTINUED | OUTPATIENT
Start: 2019-10-23 | End: 2019-11-12 | Stop reason: HOSPADM

## 2019-10-23 RX ORDER — CYANOCOBALAMIN 1000 UG/ML
1000 INJECTION INTRAMUSCULAR; SUBCUTANEOUS WEEKLY
Status: DISCONTINUED | OUTPATIENT
Start: 2019-10-23 | End: 2019-11-12

## 2019-10-23 RX ADMIN — BUDESONIDE 500 MCG: 0.5 INHALANT RESPIRATORY (INHALATION) at 18:48

## 2019-10-23 RX ADMIN — IPRATROPIUM BROMIDE AND ALBUTEROL SULFATE 3 ML: .5; 3 SOLUTION RESPIRATORY (INHALATION) at 05:30

## 2019-10-23 RX ADMIN — CYANOCOBALAMIN 1000 MCG: 1000 INJECTION, SOLUTION INTRAMUSCULAR; SUBCUTANEOUS at 12:07

## 2019-10-23 RX ADMIN — NITROFURANTOIN (MONOHYDRATE/MACROCRYSTALS) 100 MG: 75; 25 CAPSULE ORAL at 17:00

## 2019-10-23 RX ADMIN — INSULIN LISPRO 2 UNITS: 100 INJECTION, SOLUTION INTRAVENOUS; SUBCUTANEOUS at 08:36

## 2019-10-23 RX ADMIN — ERGOCALCIFEROL 50000 UNITS: 1.25 CAPSULE ORAL at 12:07

## 2019-10-23 RX ADMIN — ATORVASTATIN CALCIUM 80 MG: 80 TABLET, FILM COATED ORAL at 22:11

## 2019-10-23 RX ADMIN — CLOPIDOGREL BISULFATE 75 MG: 75 TABLET ORAL at 08:31

## 2019-10-23 RX ADMIN — INSULIN LISPRO 2 UNITS: 100 INJECTION, SOLUTION INTRAVENOUS; SUBCUTANEOUS at 12:11

## 2019-10-23 RX ADMIN — HYDROCHLOROTHIAZIDE 12.5 MG: 25 TABLET ORAL at 08:31

## 2019-10-23 RX ADMIN — ASPIRIN 81 MG 81 MG: 81 TABLET ORAL at 08:31

## 2019-10-23 RX ADMIN — ISOSORBIDE MONONITRATE 30 MG: 30 TABLET, EXTENDED RELEASE ORAL at 08:31

## 2019-10-23 RX ADMIN — INSULIN LISPRO 2 UNITS: 100 INJECTION, SOLUTION INTRAVENOUS; SUBCUTANEOUS at 17:03

## 2019-10-23 RX ADMIN — INSULIN GLARGINE 21 UNITS: 100 INJECTION, SOLUTION SUBCUTANEOUS at 23:48

## 2019-10-23 RX ADMIN — ENOXAPARIN SODIUM 40 MG: 40 INJECTION SUBCUTANEOUS at 08:30

## 2019-10-23 RX ADMIN — IPRATROPIUM BROMIDE AND ALBUTEROL SULFATE 3 ML: .5; 3 SOLUTION RESPIRATORY (INHALATION) at 18:48

## 2019-10-23 RX ADMIN — LISINOPRIL 5 MG: 2.5 TABLET ORAL at 08:30

## 2019-10-23 RX ADMIN — BUDESONIDE 500 MCG: 0.5 INHALANT RESPIRATORY (INHALATION) at 05:28

## 2019-10-23 RX ADMIN — NITROFURANTOIN (MONOHYDRATE/MACROCRYSTALS) 100 MG: 75; 25 CAPSULE ORAL at 06:10

## 2019-10-23 SDOH — SOCIAL STABILITY: SOCIAL NETWORK: HOW OFTEN DO YOU ATTEND CHURCH OR RELIGIOUS SERVICES?: NEVER

## 2019-10-23 SDOH — HEALTH STABILITY: PHYSICAL HEALTH: ON AVERAGE, HOW MANY MINUTES DO YOU ENGAGE IN EXERCISE AT THIS LEVEL?: 0 MIN

## 2019-10-23 SDOH — HEALTH STABILITY: PHYSICAL HEALTH: ON AVERAGE, HOW MANY DAYS PER WEEK DO YOU ENGAGE IN MODERATE TO STRENUOUS EXERCISE (LIKE A BRISK WALK)?: 0 DAYS

## 2019-10-23 SDOH — HEALTH STABILITY: MENTAL HEALTH
STRESS IS WHEN SOMEONE FEELS TENSE, NERVOUS, ANXIOUS, OR CAN'T SLEEP AT NIGHT BECAUSE THEIR MIND IS TROUBLED. HOW STRESSED ARE YOU?: TO SOME EXTENT

## 2019-10-23 ASSESSMENT — ENCOUNTER SYMPTOMS
WHEEZING: 0
DIARRHEA: 0
SHORTNESS OF BREATH: 1
STRIDOR: 0
COLOR CHANGE: 0
CHEST TIGHTNESS: 0
BOWEL INCONTINENCE: 1
TROUBLE SWALLOWING: 0
NAUSEA: 0
BLOOD IN STOOL: 0
EYE REDNESS: 0
ABDOMINAL PAIN: 0
BACK PAIN: 1
CONSTIPATION: 1
COUGH: 0
EYE PAIN: 0
VOMITING: 0
PHOTOPHOBIA: 0
SORE THROAT: 0
SHORTNESS OF BREATH: 0

## 2019-10-23 ASSESSMENT — PAIN SCALES - GENERAL: PAINLEVEL_OUTOF10: 0

## 2019-10-24 PROBLEM — Z98.890 HISTORY OF ANKLE SURGERY: Status: ACTIVE | Noted: 2019-10-24

## 2019-10-24 LAB
GLUCOSE BLD-MCNC: 128 MG/DL (ref 60–115)
GLUCOSE BLD-MCNC: 135 MG/DL (ref 60–115)
GLUCOSE BLD-MCNC: 146 MG/DL (ref 60–115)
GLUCOSE BLD-MCNC: 203 MG/DL (ref 60–115)
PERFORMED ON: ABNORMAL
URINE CULTURE, ROUTINE: NORMAL

## 2019-10-24 PROCEDURE — 97530 THERAPEUTIC ACTIVITIES: CPT

## 2019-10-24 PROCEDURE — 97112 NEUROMUSCULAR REEDUCATION: CPT

## 2019-10-24 PROCEDURE — 97535 SELF CARE MNGMENT TRAINING: CPT

## 2019-10-24 PROCEDURE — 94640 AIRWAY INHALATION TREATMENT: CPT

## 2019-10-24 PROCEDURE — 6360000002 HC RX W HCPCS: Performed by: INTERNAL MEDICINE

## 2019-10-24 PROCEDURE — 97116 GAIT TRAINING THERAPY: CPT

## 2019-10-24 PROCEDURE — 97127 HC SP THER IVNTJ W/FOCUS COG FUNCJ: CPT

## 2019-10-24 PROCEDURE — 51798 US URINE CAPACITY MEASURE: CPT

## 2019-10-24 PROCEDURE — 6370000000 HC RX 637 (ALT 250 FOR IP): Performed by: INTERNAL MEDICINE

## 2019-10-24 PROCEDURE — 1180000000 HC REHAB R&B

## 2019-10-24 PROCEDURE — 99233 SBSQ HOSP IP/OBS HIGH 50: CPT | Performed by: PHYSICAL MEDICINE & REHABILITATION

## 2019-10-24 RX ORDER — ACETAMINOPHEN 80 MG
TABLET,CHEWABLE ORAL ONCE
Status: COMPLETED | OUTPATIENT
Start: 2019-10-24 | End: 2019-10-24

## 2019-10-24 RX ADMIN — ACETAMINOPHEN 500 MG: 500 TABLET ORAL at 07:57

## 2019-10-24 RX ADMIN — INSULIN GLARGINE 21 UNITS: 100 INJECTION, SOLUTION SUBCUTANEOUS at 20:43

## 2019-10-24 RX ADMIN — ENOXAPARIN SODIUM 40 MG: 40 INJECTION SUBCUTANEOUS at 08:53

## 2019-10-24 RX ADMIN — BUDESONIDE 500 MCG: 0.5 INHALANT RESPIRATORY (INHALATION) at 04:52

## 2019-10-24 RX ADMIN — ACETAMINOPHEN 500 MG: 500 TABLET ORAL at 20:41

## 2019-10-24 RX ADMIN — ATORVASTATIN CALCIUM 80 MG: 80 TABLET, FILM COATED ORAL at 20:40

## 2019-10-24 RX ADMIN — HYDROCHLOROTHIAZIDE 12.5 MG: 25 TABLET ORAL at 08:53

## 2019-10-24 RX ADMIN — LISINOPRIL 5 MG: 2.5 TABLET ORAL at 08:53

## 2019-10-24 RX ADMIN — CLOPIDOGREL BISULFATE 75 MG: 75 TABLET ORAL at 08:54

## 2019-10-24 RX ADMIN — NITROFURANTOIN (MONOHYDRATE/MACROCRYSTALS) 100 MG: 75; 25 CAPSULE ORAL at 07:57

## 2019-10-24 RX ADMIN — ASPIRIN 81 MG 81 MG: 81 TABLET ORAL at 08:54

## 2019-10-24 RX ADMIN — INSULIN LISPRO 2 UNITS: 100 INJECTION, SOLUTION INTRAVENOUS; SUBCUTANEOUS at 16:41

## 2019-10-24 RX ADMIN — NITROFURANTOIN (MONOHYDRATE/MACROCRYSTALS) 100 MG: 75; 25 CAPSULE ORAL at 16:40

## 2019-10-24 RX ADMIN — Medication: at 09:30

## 2019-10-24 RX ADMIN — ISOSORBIDE MONONITRATE 30 MG: 30 TABLET, EXTENDED RELEASE ORAL at 08:53

## 2019-10-24 ASSESSMENT — PAIN SCALES - GENERAL
PAINLEVEL_OUTOF10: 0
PAINLEVEL_OUTOF10: 0

## 2019-10-25 LAB
GLUCOSE BLD-MCNC: 156 MG/DL (ref 60–115)
GLUCOSE BLD-MCNC: 166 MG/DL (ref 60–115)
GLUCOSE BLD-MCNC: 244 MG/DL (ref 60–115)
GLUCOSE BLD-MCNC: 80 MG/DL (ref 60–115)
PERFORMED ON: ABNORMAL
PERFORMED ON: NORMAL

## 2019-10-25 PROCEDURE — 97127 HC SP THER IVNTJ W/FOCUS COG FUNCJ: CPT

## 2019-10-25 PROCEDURE — 6370000000 HC RX 637 (ALT 250 FOR IP): Performed by: INTERNAL MEDICINE

## 2019-10-25 PROCEDURE — 97112 NEUROMUSCULAR REEDUCATION: CPT

## 2019-10-25 PROCEDURE — 99232 SBSQ HOSP IP/OBS MODERATE 35: CPT | Performed by: PSYCHIATRY & NEUROLOGY

## 2019-10-25 PROCEDURE — 97535 SELF CARE MNGMENT TRAINING: CPT

## 2019-10-25 PROCEDURE — 99232 SBSQ HOSP IP/OBS MODERATE 35: CPT | Performed by: PHYSICAL MEDICINE & REHABILITATION

## 2019-10-25 PROCEDURE — 6360000002 HC RX W HCPCS: Performed by: INTERNAL MEDICINE

## 2019-10-25 PROCEDURE — 97530 THERAPEUTIC ACTIVITIES: CPT

## 2019-10-25 PROCEDURE — 1180000000 HC REHAB R&B

## 2019-10-25 PROCEDURE — 97127 HC OT THER IVNTJ W/FOCUS COG FUNCJ: CPT

## 2019-10-25 PROCEDURE — 94640 AIRWAY INHALATION TREATMENT: CPT

## 2019-10-25 PROCEDURE — 97116 GAIT TRAINING THERAPY: CPT

## 2019-10-25 RX ADMIN — HYDROCHLOROTHIAZIDE 12.5 MG: 25 TABLET ORAL at 10:11

## 2019-10-25 RX ADMIN — NITROFURANTOIN (MONOHYDRATE/MACROCRYSTALS) 100 MG: 75; 25 CAPSULE ORAL at 17:54

## 2019-10-25 RX ADMIN — INSULIN LISPRO 2 UNITS: 100 INJECTION, SOLUTION INTRAVENOUS; SUBCUTANEOUS at 10:13

## 2019-10-25 RX ADMIN — BUDESONIDE 500 MCG: 0.5 INHALANT RESPIRATORY (INHALATION) at 16:33

## 2019-10-25 RX ADMIN — ENOXAPARIN SODIUM 40 MG: 40 INJECTION SUBCUTANEOUS at 10:12

## 2019-10-25 RX ADMIN — ASPIRIN 81 MG 81 MG: 81 TABLET ORAL at 10:12

## 2019-10-25 RX ADMIN — CLOPIDOGREL BISULFATE 75 MG: 75 TABLET ORAL at 10:12

## 2019-10-25 RX ADMIN — IPRATROPIUM BROMIDE AND ALBUTEROL SULFATE 3 ML: .5; 3 SOLUTION RESPIRATORY (INHALATION) at 16:33

## 2019-10-25 RX ADMIN — IPRATROPIUM BROMIDE AND ALBUTEROL SULFATE 3 ML: .5; 3 SOLUTION RESPIRATORY (INHALATION) at 04:54

## 2019-10-25 RX ADMIN — INSULIN LISPRO 4 UNITS: 100 INJECTION, SOLUTION INTRAVENOUS; SUBCUTANEOUS at 12:52

## 2019-10-25 RX ADMIN — ATORVASTATIN CALCIUM 80 MG: 80 TABLET, FILM COATED ORAL at 21:42

## 2019-10-25 RX ADMIN — NITROFURANTOIN (MONOHYDRATE/MACROCRYSTALS) 100 MG: 75; 25 CAPSULE ORAL at 06:27

## 2019-10-25 RX ADMIN — LISINOPRIL 5 MG: 2.5 TABLET ORAL at 10:10

## 2019-10-25 RX ADMIN — BUDESONIDE 500 MCG: 0.5 INHALANT RESPIRATORY (INHALATION) at 04:54

## 2019-10-25 RX ADMIN — ISOSORBIDE MONONITRATE 30 MG: 30 TABLET, EXTENDED RELEASE ORAL at 10:12

## 2019-10-25 RX ADMIN — INSULIN GLARGINE 21 UNITS: 100 INJECTION, SOLUTION SUBCUTANEOUS at 21:42

## 2019-10-25 ASSESSMENT — ENCOUNTER SYMPTOMS
TROUBLE SWALLOWING: 0
COUGH: 0
COLOR CHANGE: 0
WHEEZING: 0
VOMITING: 0
CHEST TIGHTNESS: 0
SHORTNESS OF BREATH: 0
NAUSEA: 0

## 2019-10-25 ASSESSMENT — PAIN SCALES - GENERAL
PAINLEVEL_OUTOF10: 0
PAINLEVEL_OUTOF10: 8

## 2019-10-25 ASSESSMENT — PAIN DESCRIPTION - FREQUENCY: FREQUENCY: INTERMITTENT

## 2019-10-25 ASSESSMENT — PAIN DESCRIPTION - LOCATION: LOCATION: BUTTOCKS

## 2019-10-25 ASSESSMENT — PAIN DESCRIPTION - DESCRIPTORS: DESCRIPTORS: ACHING;SORE

## 2019-10-25 ASSESSMENT — PAIN DESCRIPTION - PAIN TYPE: TYPE: ACUTE PAIN

## 2019-10-26 LAB
GLUCOSE BLD-MCNC: 112 MG/DL (ref 60–115)
GLUCOSE BLD-MCNC: 113 MG/DL (ref 60–115)
GLUCOSE BLD-MCNC: 130 MG/DL (ref 60–115)
GLUCOSE BLD-MCNC: 154 MG/DL (ref 60–115)
PERFORMED ON: ABNORMAL
PERFORMED ON: ABNORMAL
PERFORMED ON: NORMAL
PERFORMED ON: NORMAL

## 2019-10-26 PROCEDURE — 97140 MANUAL THERAPY 1/> REGIONS: CPT

## 2019-10-26 PROCEDURE — 6360000002 HC RX W HCPCS: Performed by: INTERNAL MEDICINE

## 2019-10-26 PROCEDURE — 1180000000 HC REHAB R&B

## 2019-10-26 PROCEDURE — 6370000000 HC RX 637 (ALT 250 FOR IP): Performed by: INTERNAL MEDICINE

## 2019-10-26 PROCEDURE — 97116 GAIT TRAINING THERAPY: CPT

## 2019-10-26 PROCEDURE — 97530 THERAPEUTIC ACTIVITIES: CPT

## 2019-10-26 PROCEDURE — 94640 AIRWAY INHALATION TREATMENT: CPT

## 2019-10-26 PROCEDURE — 97112 NEUROMUSCULAR REEDUCATION: CPT

## 2019-10-26 RX ADMIN — NITROFURANTOIN (MONOHYDRATE/MACROCRYSTALS) 100 MG: 75; 25 CAPSULE ORAL at 06:10

## 2019-10-26 RX ADMIN — BUDESONIDE 500 MCG: 0.5 INHALANT RESPIRATORY (INHALATION) at 16:23

## 2019-10-26 RX ADMIN — INSULIN GLARGINE 21 UNITS: 100 INJECTION, SOLUTION SUBCUTANEOUS at 21:21

## 2019-10-26 RX ADMIN — ATORVASTATIN CALCIUM 80 MG: 80 TABLET, FILM COATED ORAL at 21:18

## 2019-10-26 RX ADMIN — CLOPIDOGREL BISULFATE 75 MG: 75 TABLET ORAL at 08:30

## 2019-10-26 RX ADMIN — ASPIRIN 81 MG 81 MG: 81 TABLET ORAL at 08:30

## 2019-10-26 RX ADMIN — ISOSORBIDE MONONITRATE 30 MG: 30 TABLET, EXTENDED RELEASE ORAL at 08:29

## 2019-10-26 RX ADMIN — LISINOPRIL 5 MG: 2.5 TABLET ORAL at 08:30

## 2019-10-26 RX ADMIN — INSULIN LISPRO 2 UNITS: 100 INJECTION, SOLUTION INTRAVENOUS; SUBCUTANEOUS at 16:35

## 2019-10-26 RX ADMIN — ENOXAPARIN SODIUM 40 MG: 40 INJECTION SUBCUTANEOUS at 08:29

## 2019-10-26 RX ADMIN — BUDESONIDE 500 MCG: 0.5 INHALANT RESPIRATORY (INHALATION) at 05:07

## 2019-10-26 RX ADMIN — HYDROCHLOROTHIAZIDE 12.5 MG: 25 TABLET ORAL at 08:29

## 2019-10-26 ASSESSMENT — PAIN SCALES - GENERAL
PAINLEVEL_OUTOF10: 0

## 2019-10-26 ASSESSMENT — PAIN DESCRIPTION - PAIN TYPE: TYPE: ACUTE PAIN

## 2019-10-26 ASSESSMENT — PAIN DESCRIPTION - LOCATION: LOCATION: BUTTOCKS

## 2019-10-26 ASSESSMENT — PAIN DESCRIPTION - DESCRIPTORS: DESCRIPTORS: ACHING

## 2019-10-26 ASSESSMENT — PAIN DESCRIPTION - FREQUENCY: FREQUENCY: CONTINUOUS

## 2019-10-27 LAB
GLUCOSE BLD-MCNC: 106 MG/DL (ref 60–115)
GLUCOSE BLD-MCNC: 134 MG/DL (ref 60–115)
GLUCOSE BLD-MCNC: 134 MG/DL (ref 60–115)
GLUCOSE BLD-MCNC: 142 MG/DL (ref 60–115)
PERFORMED ON: ABNORMAL
PERFORMED ON: NORMAL

## 2019-10-27 PROCEDURE — 6360000002 HC RX W HCPCS: Performed by: INTERNAL MEDICINE

## 2019-10-27 PROCEDURE — 6370000000 HC RX 637 (ALT 250 FOR IP): Performed by: INTERNAL MEDICINE

## 2019-10-27 PROCEDURE — 94640 AIRWAY INHALATION TREATMENT: CPT

## 2019-10-27 PROCEDURE — 1180000000 HC REHAB R&B

## 2019-10-27 RX ADMIN — BUDESONIDE 500 MCG: 0.5 INHALANT RESPIRATORY (INHALATION) at 04:12

## 2019-10-27 RX ADMIN — ASPIRIN 81 MG 81 MG: 81 TABLET ORAL at 08:45

## 2019-10-27 RX ADMIN — BUDESONIDE 500 MCG: 0.5 INHALANT RESPIRATORY (INHALATION) at 15:32

## 2019-10-27 RX ADMIN — HYDROCHLOROTHIAZIDE 12.5 MG: 25 TABLET ORAL at 08:45

## 2019-10-27 RX ADMIN — INSULIN GLARGINE 21 UNITS: 100 INJECTION, SOLUTION SUBCUTANEOUS at 21:25

## 2019-10-27 RX ADMIN — CLOPIDOGREL BISULFATE 75 MG: 75 TABLET ORAL at 08:45

## 2019-10-27 RX ADMIN — ATORVASTATIN CALCIUM 80 MG: 80 TABLET, FILM COATED ORAL at 20:48

## 2019-10-27 RX ADMIN — LISINOPRIL 5 MG: 2.5 TABLET ORAL at 08:45

## 2019-10-27 RX ADMIN — ENOXAPARIN SODIUM 40 MG: 40 INJECTION SUBCUTANEOUS at 08:45

## 2019-10-27 RX ADMIN — INSULIN LISPRO 2 UNITS: 100 INJECTION, SOLUTION INTRAVENOUS; SUBCUTANEOUS at 12:11

## 2019-10-27 RX ADMIN — ISOSORBIDE MONONITRATE 30 MG: 30 TABLET, EXTENDED RELEASE ORAL at 08:52

## 2019-10-28 LAB
GLUCOSE BLD-MCNC: 120 MG/DL (ref 60–115)
GLUCOSE BLD-MCNC: 130 MG/DL (ref 60–115)
GLUCOSE BLD-MCNC: 154 MG/DL (ref 60–115)
GLUCOSE BLD-MCNC: 182 MG/DL (ref 60–115)
PERFORMED ON: ABNORMAL

## 2019-10-28 PROCEDURE — 6360000002 HC RX W HCPCS: Performed by: INTERNAL MEDICINE

## 2019-10-28 PROCEDURE — 97116 GAIT TRAINING THERAPY: CPT

## 2019-10-28 PROCEDURE — 99232 SBSQ HOSP IP/OBS MODERATE 35: CPT | Performed by: PHYSICAL MEDICINE & REHABILITATION

## 2019-10-28 PROCEDURE — 51798 US URINE CAPACITY MEASURE: CPT

## 2019-10-28 PROCEDURE — 94640 AIRWAY INHALATION TREATMENT: CPT

## 2019-10-28 PROCEDURE — 6370000000 HC RX 637 (ALT 250 FOR IP): Performed by: INTERNAL MEDICINE

## 2019-10-28 PROCEDURE — 97535 SELF CARE MNGMENT TRAINING: CPT

## 2019-10-28 PROCEDURE — 97127 HC SP THER IVNTJ W/FOCUS COG FUNCJ: CPT

## 2019-10-28 PROCEDURE — 1180000000 HC REHAB R&B

## 2019-10-28 PROCEDURE — 94760 N-INVAS EAR/PLS OXIMETRY 1: CPT

## 2019-10-28 PROCEDURE — 97110 THERAPEUTIC EXERCISES: CPT

## 2019-10-28 PROCEDURE — 97530 THERAPEUTIC ACTIVITIES: CPT

## 2019-10-28 RX ADMIN — ASPIRIN 81 MG 81 MG: 81 TABLET ORAL at 08:48

## 2019-10-28 RX ADMIN — ENOXAPARIN SODIUM 40 MG: 40 INJECTION SUBCUTANEOUS at 08:45

## 2019-10-28 RX ADMIN — ATORVASTATIN CALCIUM 80 MG: 80 TABLET, FILM COATED ORAL at 22:06

## 2019-10-28 RX ADMIN — CLOPIDOGREL BISULFATE 75 MG: 75 TABLET ORAL at 08:48

## 2019-10-28 RX ADMIN — LISINOPRIL 5 MG: 2.5 TABLET ORAL at 08:45

## 2019-10-28 RX ADMIN — BUDESONIDE 500 MCG: 0.5 INHALANT RESPIRATORY (INHALATION) at 15:59

## 2019-10-28 RX ADMIN — INSULIN LISPRO 2 UNITS: 100 INJECTION, SOLUTION INTRAVENOUS; SUBCUTANEOUS at 08:52

## 2019-10-28 RX ADMIN — HYDROCHLOROTHIAZIDE 12.5 MG: 25 TABLET ORAL at 08:49

## 2019-10-28 RX ADMIN — INSULIN GLARGINE 21 UNITS: 100 INJECTION, SOLUTION SUBCUTANEOUS at 22:06

## 2019-10-28 RX ADMIN — ISOSORBIDE MONONITRATE 30 MG: 30 TABLET, EXTENDED RELEASE ORAL at 08:48

## 2019-10-28 RX ADMIN — BUDESONIDE 500 MCG: 0.5 INHALANT RESPIRATORY (INHALATION) at 04:43

## 2019-10-29 LAB
BACTERIA: ABNORMAL /HPF
BILIRUBIN URINE: NEGATIVE
BLOOD, URINE: ABNORMAL
CLARITY: ABNORMAL
COLOR: YELLOW
EPITHELIAL CELLS, UA: ABNORMAL /HPF (ref 0–5)
GLUCOSE BLD-MCNC: 110 MG/DL (ref 60–115)
GLUCOSE BLD-MCNC: 121 MG/DL (ref 60–115)
GLUCOSE BLD-MCNC: 126 MG/DL (ref 60–115)
GLUCOSE BLD-MCNC: 144 MG/DL (ref 60–115)
GLUCOSE URINE: NEGATIVE MG/DL
HYALINE CASTS: ABNORMAL /HPF (ref 0–5)
KETONES, URINE: NEGATIVE MG/DL
LEUKOCYTE ESTERASE, URINE: ABNORMAL
NITRITE, URINE: POSITIVE
PERFORMED ON: ABNORMAL
PERFORMED ON: NORMAL
PH UA: 5.5 (ref 5–9)
PROTEIN UA: ABNORMAL MG/DL
RBC UA: ABNORMAL /HPF (ref 0–5)
SPECIFIC GRAVITY UA: 1.01 (ref 1–1.03)
UROBILINOGEN, URINE: 0.2 E.U./DL
WBC UA: ABNORMAL /HPF (ref 0–5)

## 2019-10-29 PROCEDURE — 87086 URINE CULTURE/COLONY COUNT: CPT

## 2019-10-29 PROCEDURE — 97140 MANUAL THERAPY 1/> REGIONS: CPT

## 2019-10-29 PROCEDURE — 97116 GAIT TRAINING THERAPY: CPT

## 2019-10-29 PROCEDURE — 6360000002 HC RX W HCPCS: Performed by: INTERNAL MEDICINE

## 2019-10-29 PROCEDURE — 97127 HC SP THER IVNTJ W/FOCUS COG FUNCJ: CPT

## 2019-10-29 PROCEDURE — 6370000000 HC RX 637 (ALT 250 FOR IP): Performed by: INTERNAL MEDICINE

## 2019-10-29 PROCEDURE — 97530 THERAPEUTIC ACTIVITIES: CPT

## 2019-10-29 PROCEDURE — 94640 AIRWAY INHALATION TREATMENT: CPT

## 2019-10-29 PROCEDURE — 97535 SELF CARE MNGMENT TRAINING: CPT

## 2019-10-29 PROCEDURE — 87077 CULTURE AEROBIC IDENTIFY: CPT

## 2019-10-29 PROCEDURE — 87186 SC STD MICRODIL/AGAR DIL: CPT

## 2019-10-29 PROCEDURE — 1180000000 HC REHAB R&B

## 2019-10-29 PROCEDURE — 97112 NEUROMUSCULAR REEDUCATION: CPT

## 2019-10-29 PROCEDURE — 81001 URINALYSIS AUTO W/SCOPE: CPT

## 2019-10-29 PROCEDURE — 6370000000 HC RX 637 (ALT 250 FOR IP): Performed by: PHYSICAL MEDICINE & REHABILITATION

## 2019-10-29 PROCEDURE — 99232 SBSQ HOSP IP/OBS MODERATE 35: CPT | Performed by: PHYSICAL MEDICINE & REHABILITATION

## 2019-10-29 RX ORDER — NITROFURANTOIN 25; 75 MG/1; MG/1
100 CAPSULE ORAL
Status: DISCONTINUED | OUTPATIENT
Start: 2019-10-29 | End: 2019-11-02

## 2019-10-29 RX ADMIN — BUDESONIDE 500 MCG: 0.5 INHALANT RESPIRATORY (INHALATION) at 05:18

## 2019-10-29 RX ADMIN — LISINOPRIL 5 MG: 2.5 TABLET ORAL at 08:28

## 2019-10-29 RX ADMIN — ISOSORBIDE MONONITRATE 30 MG: 30 TABLET, EXTENDED RELEASE ORAL at 08:29

## 2019-10-29 RX ADMIN — INSULIN LISPRO 2 UNITS: 100 INJECTION, SOLUTION INTRAVENOUS; SUBCUTANEOUS at 08:29

## 2019-10-29 RX ADMIN — ATORVASTATIN CALCIUM 80 MG: 80 TABLET, FILM COATED ORAL at 20:42

## 2019-10-29 RX ADMIN — ENOXAPARIN SODIUM 40 MG: 40 INJECTION SUBCUTANEOUS at 08:28

## 2019-10-29 RX ADMIN — NITROFURANTOIN (MONOHYDRATE/MACROCRYSTALS) 100 MG: 75; 25 CAPSULE ORAL at 08:28

## 2019-10-29 RX ADMIN — NITROFURANTOIN (MONOHYDRATE/MACROCRYSTALS) 100 MG: 75; 25 CAPSULE ORAL at 17:17

## 2019-10-29 RX ADMIN — ASPIRIN 81 MG 81 MG: 81 TABLET ORAL at 08:28

## 2019-10-29 RX ADMIN — INSULIN GLARGINE 21 UNITS: 100 INJECTION, SOLUTION SUBCUTANEOUS at 20:43

## 2019-10-29 RX ADMIN — CLOPIDOGREL BISULFATE 75 MG: 75 TABLET ORAL at 08:29

## 2019-10-29 RX ADMIN — HYDROCHLOROTHIAZIDE 12.5 MG: 25 TABLET ORAL at 08:28

## 2019-10-29 ASSESSMENT — PAIN SCALES - GENERAL
PAINLEVEL_OUTOF10: 0

## 2019-10-30 LAB
GLUCOSE BLD-MCNC: 117 MG/DL (ref 60–115)
GLUCOSE BLD-MCNC: 124 MG/DL (ref 60–115)
GLUCOSE BLD-MCNC: 128 MG/DL (ref 60–115)
GLUCOSE BLD-MCNC: 159 MG/DL (ref 60–115)
PERFORMED ON: ABNORMAL

## 2019-10-30 PROCEDURE — 6370000000 HC RX 637 (ALT 250 FOR IP): Performed by: PHYSICAL MEDICINE & REHABILITATION

## 2019-10-30 PROCEDURE — 6360000002 HC RX W HCPCS: Performed by: INTERNAL MEDICINE

## 2019-10-30 PROCEDURE — 99232 SBSQ HOSP IP/OBS MODERATE 35: CPT | Performed by: PHYSICAL MEDICINE & REHABILITATION

## 2019-10-30 PROCEDURE — 6370000000 HC RX 637 (ALT 250 FOR IP): Performed by: INTERNAL MEDICINE

## 2019-10-30 PROCEDURE — 97116 GAIT TRAINING THERAPY: CPT

## 2019-10-30 PROCEDURE — 97127 HC SP THER IVNTJ W/FOCUS COG FUNCJ: CPT

## 2019-10-30 PROCEDURE — 1180000000 HC REHAB R&B

## 2019-10-30 PROCEDURE — 6360000002 HC RX W HCPCS: Performed by: PHYSICAL MEDICINE & REHABILITATION

## 2019-10-30 PROCEDURE — 97535 SELF CARE MNGMENT TRAINING: CPT

## 2019-10-30 PROCEDURE — 97530 THERAPEUTIC ACTIVITIES: CPT

## 2019-10-30 PROCEDURE — 97112 NEUROMUSCULAR REEDUCATION: CPT

## 2019-10-30 PROCEDURE — 94640 AIRWAY INHALATION TREATMENT: CPT

## 2019-10-30 PROCEDURE — 99221 1ST HOSP IP/OBS SF/LOW 40: CPT | Performed by: UROLOGY

## 2019-10-30 RX ADMIN — HYDROCHLOROTHIAZIDE 12.5 MG: 25 TABLET ORAL at 09:01

## 2019-10-30 RX ADMIN — NITROFURANTOIN (MONOHYDRATE/MACROCRYSTALS) 100 MG: 75; 25 CAPSULE ORAL at 05:51

## 2019-10-30 RX ADMIN — ENOXAPARIN SODIUM 40 MG: 40 INJECTION SUBCUTANEOUS at 09:00

## 2019-10-30 RX ADMIN — ISOSORBIDE MONONITRATE 30 MG: 30 TABLET, EXTENDED RELEASE ORAL at 09:01

## 2019-10-30 RX ADMIN — ERGOCALCIFEROL 50000 UNITS: 1.25 CAPSULE ORAL at 12:13

## 2019-10-30 RX ADMIN — CLOPIDOGREL BISULFATE 75 MG: 75 TABLET ORAL at 09:01

## 2019-10-30 RX ADMIN — LISINOPRIL 5 MG: 2.5 TABLET ORAL at 09:01

## 2019-10-30 RX ADMIN — CYANOCOBALAMIN 1000 MCG: 1000 INJECTION, SOLUTION INTRAMUSCULAR; SUBCUTANEOUS at 09:01

## 2019-10-30 RX ADMIN — BUDESONIDE 500 MCG: 0.5 INHALANT RESPIRATORY (INHALATION) at 16:22

## 2019-10-30 RX ADMIN — ATORVASTATIN CALCIUM 80 MG: 80 TABLET, FILM COATED ORAL at 22:19

## 2019-10-30 RX ADMIN — BUDESONIDE 500 MCG: 0.5 INHALANT RESPIRATORY (INHALATION) at 05:11

## 2019-10-30 RX ADMIN — ASPIRIN 81 MG 81 MG: 81 TABLET ORAL at 09:00

## 2019-10-30 RX ADMIN — NITROFURANTOIN (MONOHYDRATE/MACROCRYSTALS) 100 MG: 75; 25 CAPSULE ORAL at 17:14

## 2019-10-30 RX ADMIN — INSULIN GLARGINE 21 UNITS: 100 INJECTION, SOLUTION SUBCUTANEOUS at 22:20

## 2019-10-30 ASSESSMENT — PAIN SCALES - GENERAL
PAINLEVEL_OUTOF10: 0
PAINLEVEL_OUTOF10: 10
PAINLEVEL_OUTOF10: 0
PAINLEVEL_OUTOF10: 0

## 2019-10-30 ASSESSMENT — PAIN SCALES - WONG BAKER
WONGBAKER_NUMERICALRESPONSE: 0

## 2019-10-30 ASSESSMENT — PAIN DESCRIPTION - DESCRIPTORS: DESCRIPTORS: SORE

## 2019-10-30 ASSESSMENT — PAIN DESCRIPTION - PAIN TYPE: TYPE: ACUTE PAIN

## 2019-10-30 ASSESSMENT — PAIN DESCRIPTION - LOCATION: LOCATION: BUTTOCKS

## 2019-10-30 ASSESSMENT — PAIN DESCRIPTION - FREQUENCY: FREQUENCY: CONTINUOUS

## 2019-10-30 ASSESSMENT — PAIN DESCRIPTION - PROGRESSION: CLINICAL_PROGRESSION: RESOLVED

## 2019-10-30 ASSESSMENT — PAIN - FUNCTIONAL ASSESSMENT: PAIN_FUNCTIONAL_ASSESSMENT: ACTIVITIES ARE NOT PREVENTED

## 2019-10-31 LAB
GLUCOSE BLD-MCNC: 120 MG/DL (ref 60–115)
GLUCOSE BLD-MCNC: 127 MG/DL (ref 60–115)
GLUCOSE BLD-MCNC: 129 MG/DL (ref 60–115)
GLUCOSE BLD-MCNC: 190 MG/DL (ref 60–115)
ORGANISM: ABNORMAL
PERFORMED ON: ABNORMAL
URINE CULTURE, ROUTINE: ABNORMAL

## 2019-10-31 PROCEDURE — 97112 NEUROMUSCULAR REEDUCATION: CPT

## 2019-10-31 PROCEDURE — 97110 THERAPEUTIC EXERCISES: CPT

## 2019-10-31 PROCEDURE — 6370000000 HC RX 637 (ALT 250 FOR IP): Performed by: PHYSICAL MEDICINE & REHABILITATION

## 2019-10-31 PROCEDURE — 99233 SBSQ HOSP IP/OBS HIGH 50: CPT | Performed by: PHYSICAL MEDICINE & REHABILITATION

## 2019-10-31 PROCEDURE — 6360000002 HC RX W HCPCS: Performed by: INTERNAL MEDICINE

## 2019-10-31 PROCEDURE — 6370000000 HC RX 637 (ALT 250 FOR IP): Performed by: INTERNAL MEDICINE

## 2019-10-31 PROCEDURE — 94640 AIRWAY INHALATION TREATMENT: CPT

## 2019-10-31 PROCEDURE — 1180000000 HC REHAB R&B

## 2019-10-31 PROCEDURE — 99232 SBSQ HOSP IP/OBS MODERATE 35: CPT | Performed by: PSYCHIATRY & NEUROLOGY

## 2019-10-31 PROCEDURE — 97116 GAIT TRAINING THERAPY: CPT

## 2019-10-31 PROCEDURE — 97530 THERAPEUTIC ACTIVITIES: CPT

## 2019-10-31 PROCEDURE — 97127 HC OT THER IVNTJ W/FOCUS COG FUNCJ: CPT

## 2019-10-31 RX ADMIN — HYDROCHLOROTHIAZIDE 12.5 MG: 25 TABLET ORAL at 08:20

## 2019-10-31 RX ADMIN — NITROFURANTOIN (MONOHYDRATE/MACROCRYSTALS) 100 MG: 75; 25 CAPSULE ORAL at 06:03

## 2019-10-31 RX ADMIN — NITROFURANTOIN (MONOHYDRATE/MACROCRYSTALS) 100 MG: 75; 25 CAPSULE ORAL at 16:25

## 2019-10-31 RX ADMIN — ATORVASTATIN CALCIUM 80 MG: 80 TABLET, FILM COATED ORAL at 22:08

## 2019-10-31 RX ADMIN — INSULIN GLARGINE 21 UNITS: 100 INJECTION, SOLUTION SUBCUTANEOUS at 22:09

## 2019-10-31 RX ADMIN — ASPIRIN 81 MG 81 MG: 81 TABLET ORAL at 08:21

## 2019-10-31 RX ADMIN — ENOXAPARIN SODIUM 40 MG: 40 INJECTION SUBCUTANEOUS at 08:21

## 2019-10-31 RX ADMIN — CLOPIDOGREL BISULFATE 75 MG: 75 TABLET ORAL at 08:20

## 2019-10-31 RX ADMIN — BUDESONIDE 500 MCG: 0.5 INHALANT RESPIRATORY (INHALATION) at 16:47

## 2019-10-31 RX ADMIN — LISINOPRIL 5 MG: 2.5 TABLET ORAL at 08:20

## 2019-10-31 RX ADMIN — BUDESONIDE 500 MCG: 0.5 INHALANT RESPIRATORY (INHALATION) at 04:32

## 2019-10-31 RX ADMIN — ISOSORBIDE MONONITRATE 30 MG: 30 TABLET, EXTENDED RELEASE ORAL at 08:21

## 2019-10-31 ASSESSMENT — ENCOUNTER SYMPTOMS
ABDOMINAL DISTENTION: 0
WHEEZING: 0
CHEST TIGHTNESS: 0
NAUSEA: 0
SHORTNESS OF BREATH: 0
CONSTIPATION: 0
ABDOMINAL PAIN: 0
COLOR CHANGE: 0
TROUBLE SWALLOWING: 0
DIARRHEA: 0
VOMITING: 0
COUGH: 0

## 2019-10-31 ASSESSMENT — PAIN SCALES - GENERAL: PAINLEVEL_OUTOF10: 0

## 2019-11-01 LAB
GLUCOSE BLD-MCNC: 108 MG/DL (ref 60–115)
GLUCOSE BLD-MCNC: 115 MG/DL (ref 60–115)
GLUCOSE BLD-MCNC: 140 MG/DL (ref 60–115)
GLUCOSE BLD-MCNC: 163 MG/DL (ref 60–115)
PERFORMED ON: ABNORMAL
PERFORMED ON: ABNORMAL
PERFORMED ON: NORMAL
PERFORMED ON: NORMAL

## 2019-11-01 PROCEDURE — 99232 SBSQ HOSP IP/OBS MODERATE 35: CPT | Performed by: PHYSICAL MEDICINE & REHABILITATION

## 2019-11-01 PROCEDURE — 97535 SELF CARE MNGMENT TRAINING: CPT

## 2019-11-01 PROCEDURE — 97530 THERAPEUTIC ACTIVITIES: CPT

## 2019-11-01 PROCEDURE — 6370000000 HC RX 637 (ALT 250 FOR IP): Performed by: PHYSICAL MEDICINE & REHABILITATION

## 2019-11-01 PROCEDURE — 6370000000 HC RX 637 (ALT 250 FOR IP): Performed by: INTERNAL MEDICINE

## 2019-11-01 PROCEDURE — 97112 NEUROMUSCULAR REEDUCATION: CPT

## 2019-11-01 PROCEDURE — 6360000002 HC RX W HCPCS: Performed by: INTERNAL MEDICINE

## 2019-11-01 PROCEDURE — 94640 AIRWAY INHALATION TREATMENT: CPT

## 2019-11-01 PROCEDURE — 97116 GAIT TRAINING THERAPY: CPT

## 2019-11-01 PROCEDURE — 97110 THERAPEUTIC EXERCISES: CPT

## 2019-11-01 PROCEDURE — 97127 HC SP THER IVNTJ W/FOCUS COG FUNCJ: CPT

## 2019-11-01 PROCEDURE — 99232 SBSQ HOSP IP/OBS MODERATE 35: CPT | Performed by: PSYCHIATRY & NEUROLOGY

## 2019-11-01 PROCEDURE — 1180000000 HC REHAB R&B

## 2019-11-01 RX ADMIN — NITROFURANTOIN (MONOHYDRATE/MACROCRYSTALS) 100 MG: 75; 25 CAPSULE ORAL at 18:14

## 2019-11-01 RX ADMIN — NITROFURANTOIN (MONOHYDRATE/MACROCRYSTALS) 100 MG: 75; 25 CAPSULE ORAL at 05:41

## 2019-11-01 RX ADMIN — INSULIN LISPRO 2 UNITS: 100 INJECTION, SOLUTION INTRAVENOUS; SUBCUTANEOUS at 08:43

## 2019-11-01 RX ADMIN — BUDESONIDE 500 MCG: 0.5 INHALANT RESPIRATORY (INHALATION) at 16:26

## 2019-11-01 RX ADMIN — INSULIN LISPRO 2 UNITS: 100 INJECTION, SOLUTION INTRAVENOUS; SUBCUTANEOUS at 18:16

## 2019-11-01 RX ADMIN — CLOPIDOGREL BISULFATE 75 MG: 75 TABLET ORAL at 08:42

## 2019-11-01 RX ADMIN — ISOSORBIDE MONONITRATE 30 MG: 30 TABLET, EXTENDED RELEASE ORAL at 08:42

## 2019-11-01 RX ADMIN — HYDROCHLOROTHIAZIDE 12.5 MG: 25 TABLET ORAL at 08:42

## 2019-11-01 RX ADMIN — ENOXAPARIN SODIUM 40 MG: 40 INJECTION SUBCUTANEOUS at 08:43

## 2019-11-01 RX ADMIN — ATORVASTATIN CALCIUM 80 MG: 80 TABLET, FILM COATED ORAL at 21:03

## 2019-11-01 RX ADMIN — LISINOPRIL 5 MG: 2.5 TABLET ORAL at 08:41

## 2019-11-01 RX ADMIN — ASPIRIN 81 MG 81 MG: 81 TABLET ORAL at 08:42

## 2019-11-01 RX ADMIN — INSULIN GLARGINE 21 UNITS: 100 INJECTION, SOLUTION SUBCUTANEOUS at 21:03

## 2019-11-01 RX ADMIN — BUDESONIDE 500 MCG: 0.5 INHALANT RESPIRATORY (INHALATION) at 04:42

## 2019-11-01 RX ADMIN — IPRATROPIUM BROMIDE AND ALBUTEROL SULFATE 3 ML: .5; 3 SOLUTION RESPIRATORY (INHALATION) at 16:26

## 2019-11-01 ASSESSMENT — ENCOUNTER SYMPTOMS
COLOR CHANGE: 0
SHORTNESS OF BREATH: 0
CHEST TIGHTNESS: 0
COUGH: 0
WHEEZING: 0
TROUBLE SWALLOWING: 0

## 2019-11-01 ASSESSMENT — PAIN SCALES - GENERAL: PAINLEVEL_OUTOF10: 0

## 2019-11-02 LAB
GLUCOSE BLD-MCNC: 101 MG/DL (ref 60–115)
GLUCOSE BLD-MCNC: 114 MG/DL (ref 60–115)
GLUCOSE BLD-MCNC: 120 MG/DL (ref 60–115)
GLUCOSE BLD-MCNC: 122 MG/DL (ref 60–115)
PERFORMED ON: ABNORMAL
PERFORMED ON: ABNORMAL
PERFORMED ON: NORMAL
PERFORMED ON: NORMAL

## 2019-11-02 PROCEDURE — 6370000000 HC RX 637 (ALT 250 FOR IP): Performed by: INTERNAL MEDICINE

## 2019-11-02 PROCEDURE — 94640 AIRWAY INHALATION TREATMENT: CPT

## 2019-11-02 PROCEDURE — 97116 GAIT TRAINING THERAPY: CPT

## 2019-11-02 PROCEDURE — 1180000000 HC REHAB R&B

## 2019-11-02 PROCEDURE — 6370000000 HC RX 637 (ALT 250 FOR IP): Performed by: PHYSICAL MEDICINE & REHABILITATION

## 2019-11-02 PROCEDURE — 6360000002 HC RX W HCPCS: Performed by: INTERNAL MEDICINE

## 2019-11-02 PROCEDURE — 97530 THERAPEUTIC ACTIVITIES: CPT

## 2019-11-02 PROCEDURE — 97110 THERAPEUTIC EXERCISES: CPT

## 2019-11-02 RX ORDER — CIPROFLOXACIN 500 MG/1
500 TABLET, FILM COATED ORAL EVERY 12 HOURS SCHEDULED
Status: DISCONTINUED | OUTPATIENT
Start: 2019-11-02 | End: 2019-11-05

## 2019-11-02 RX ADMIN — NITROFURANTOIN (MONOHYDRATE/MACROCRYSTALS) 100 MG: 75; 25 CAPSULE ORAL at 06:23

## 2019-11-02 RX ADMIN — BUDESONIDE 500 MCG: 0.5 INHALANT RESPIRATORY (INHALATION) at 16:33

## 2019-11-02 RX ADMIN — ISOSORBIDE MONONITRATE 30 MG: 30 TABLET, EXTENDED RELEASE ORAL at 09:01

## 2019-11-02 RX ADMIN — CLOPIDOGREL BISULFATE 75 MG: 75 TABLET ORAL at 08:58

## 2019-11-02 RX ADMIN — ENOXAPARIN SODIUM 40 MG: 40 INJECTION SUBCUTANEOUS at 08:57

## 2019-11-02 RX ADMIN — CIPROFLOXACIN 500 MG: 500 TABLET, FILM COATED ORAL at 21:11

## 2019-11-02 RX ADMIN — INSULIN GLARGINE 21 UNITS: 100 INJECTION, SOLUTION SUBCUTANEOUS at 21:12

## 2019-11-02 RX ADMIN — ASPIRIN 81 MG 81 MG: 81 TABLET ORAL at 08:58

## 2019-11-02 RX ADMIN — HYDROCHLOROTHIAZIDE 12.5 MG: 25 TABLET ORAL at 08:58

## 2019-11-02 RX ADMIN — ATORVASTATIN CALCIUM 80 MG: 80 TABLET, FILM COATED ORAL at 21:11

## 2019-11-02 RX ADMIN — BUDESONIDE 500 MCG: 0.5 INHALANT RESPIRATORY (INHALATION) at 04:40

## 2019-11-02 RX ADMIN — LISINOPRIL 5 MG: 2.5 TABLET ORAL at 08:57

## 2019-11-02 ASSESSMENT — PAIN SCALES - GENERAL
PAINLEVEL_OUTOF10: 0
PAINLEVEL_OUTOF10: 0

## 2019-11-02 ASSESSMENT — PAIN SCALES - WONG BAKER
WONGBAKER_NUMERICALRESPONSE: 0
WONGBAKER_NUMERICALRESPONSE: 0

## 2019-11-03 LAB
GLUCOSE BLD-MCNC: 115 MG/DL (ref 60–115)
GLUCOSE BLD-MCNC: 123 MG/DL (ref 60–115)
GLUCOSE BLD-MCNC: 223 MG/DL (ref 60–115)
GLUCOSE BLD-MCNC: 89 MG/DL (ref 60–115)
PERFORMED ON: ABNORMAL
PERFORMED ON: ABNORMAL
PERFORMED ON: NORMAL
PERFORMED ON: NORMAL

## 2019-11-03 PROCEDURE — 94640 AIRWAY INHALATION TREATMENT: CPT

## 2019-11-03 PROCEDURE — 97116 GAIT TRAINING THERAPY: CPT

## 2019-11-03 PROCEDURE — 6370000000 HC RX 637 (ALT 250 FOR IP): Performed by: INTERNAL MEDICINE

## 2019-11-03 PROCEDURE — 1180000000 HC REHAB R&B

## 2019-11-03 PROCEDURE — 6370000000 HC RX 637 (ALT 250 FOR IP): Performed by: PHYSICAL MEDICINE & REHABILITATION

## 2019-11-03 PROCEDURE — 97112 NEUROMUSCULAR REEDUCATION: CPT

## 2019-11-03 PROCEDURE — 6360000002 HC RX W HCPCS: Performed by: INTERNAL MEDICINE

## 2019-11-03 RX ADMIN — BUDESONIDE 500 MCG: 0.5 INHALANT RESPIRATORY (INHALATION) at 16:08

## 2019-11-03 RX ADMIN — INSULIN LISPRO 4 UNITS: 100 INJECTION, SOLUTION INTRAVENOUS; SUBCUTANEOUS at 17:44

## 2019-11-03 RX ADMIN — HYDROCHLOROTHIAZIDE 12.5 MG: 25 TABLET ORAL at 08:38

## 2019-11-03 RX ADMIN — ASPIRIN 81 MG 81 MG: 81 TABLET ORAL at 08:39

## 2019-11-03 RX ADMIN — ENOXAPARIN SODIUM 40 MG: 40 INJECTION SUBCUTANEOUS at 08:38

## 2019-11-03 RX ADMIN — CIPROFLOXACIN 500 MG: 500 TABLET, FILM COATED ORAL at 08:38

## 2019-11-03 RX ADMIN — BUDESONIDE 500 MCG: 0.5 INHALANT RESPIRATORY (INHALATION) at 04:40

## 2019-11-03 RX ADMIN — CLOPIDOGREL BISULFATE 75 MG: 75 TABLET ORAL at 08:39

## 2019-11-03 RX ADMIN — LISINOPRIL 5 MG: 2.5 TABLET ORAL at 08:39

## 2019-11-03 RX ADMIN — INSULIN GLARGINE 21 UNITS: 100 INJECTION, SOLUTION SUBCUTANEOUS at 20:50

## 2019-11-03 RX ADMIN — ATORVASTATIN CALCIUM 80 MG: 80 TABLET, FILM COATED ORAL at 20:49

## 2019-11-03 RX ADMIN — ISOSORBIDE MONONITRATE 30 MG: 30 TABLET, EXTENDED RELEASE ORAL at 08:38

## 2019-11-03 RX ADMIN — CIPROFLOXACIN 500 MG: 500 TABLET, FILM COATED ORAL at 20:49

## 2019-11-03 ASSESSMENT — PAIN SCALES - GENERAL
PAINLEVEL_OUTOF10: 0
PAINLEVEL_OUTOF10: 0

## 2019-11-04 LAB
GLUCOSE BLD-MCNC: 102 MG/DL (ref 60–115)
GLUCOSE BLD-MCNC: 119 MG/DL (ref 60–115)
GLUCOSE BLD-MCNC: 209 MG/DL (ref 60–115)
GLUCOSE BLD-MCNC: 93 MG/DL (ref 60–115)
PERFORMED ON: ABNORMAL
PERFORMED ON: ABNORMAL
PERFORMED ON: NORMAL
PERFORMED ON: NORMAL

## 2019-11-04 PROCEDURE — 6360000002 HC RX W HCPCS: Performed by: INTERNAL MEDICINE

## 2019-11-04 PROCEDURE — 94640 AIRWAY INHALATION TREATMENT: CPT

## 2019-11-04 PROCEDURE — 97116 GAIT TRAINING THERAPY: CPT

## 2019-11-04 PROCEDURE — 1180000000 HC REHAB R&B

## 2019-11-04 PROCEDURE — 99231 SBSQ HOSP IP/OBS SF/LOW 25: CPT | Performed by: PHYSICAL MEDICINE & REHABILITATION

## 2019-11-04 PROCEDURE — 97150 GROUP THERAPEUTIC PROCEDURES: CPT

## 2019-11-04 PROCEDURE — 97530 THERAPEUTIC ACTIVITIES: CPT

## 2019-11-04 PROCEDURE — 6370000000 HC RX 637 (ALT 250 FOR IP): Performed by: INTERNAL MEDICINE

## 2019-11-04 PROCEDURE — 97535 SELF CARE MNGMENT TRAINING: CPT

## 2019-11-04 PROCEDURE — 6370000000 HC RX 637 (ALT 250 FOR IP): Performed by: PHYSICAL MEDICINE & REHABILITATION

## 2019-11-04 PROCEDURE — 51702 INSERT TEMP BLADDER CATH: CPT

## 2019-11-04 PROCEDURE — 97112 NEUROMUSCULAR REEDUCATION: CPT

## 2019-11-04 RX ORDER — LIDOCAINE 4 G/G
3 PATCH TOPICAL DAILY PRN
Status: DISCONTINUED | OUTPATIENT
Start: 2019-11-04 | End: 2019-11-12 | Stop reason: HOSPADM

## 2019-11-04 RX ADMIN — HYDROCHLOROTHIAZIDE 12.5 MG: 25 TABLET ORAL at 09:10

## 2019-11-04 RX ADMIN — BUDESONIDE 500 MCG: 0.5 INHALANT RESPIRATORY (INHALATION) at 05:24

## 2019-11-04 RX ADMIN — BUDESONIDE 500 MCG: 0.5 INHALANT RESPIRATORY (INHALATION) at 16:03

## 2019-11-04 RX ADMIN — ATORVASTATIN CALCIUM 80 MG: 80 TABLET, FILM COATED ORAL at 20:46

## 2019-11-04 RX ADMIN — INSULIN LISPRO 4 UNITS: 100 INJECTION, SOLUTION INTRAVENOUS; SUBCUTANEOUS at 17:30

## 2019-11-04 RX ADMIN — CIPROFLOXACIN 500 MG: 500 TABLET, FILM COATED ORAL at 09:09

## 2019-11-04 RX ADMIN — LISINOPRIL 5 MG: 2.5 TABLET ORAL at 09:09

## 2019-11-04 RX ADMIN — CLOPIDOGREL BISULFATE 75 MG: 75 TABLET ORAL at 09:09

## 2019-11-04 RX ADMIN — ISOSORBIDE MONONITRATE 30 MG: 30 TABLET, EXTENDED RELEASE ORAL at 09:09

## 2019-11-04 RX ADMIN — INSULIN GLARGINE 21 UNITS: 100 INJECTION, SOLUTION SUBCUTANEOUS at 22:22

## 2019-11-04 RX ADMIN — ASPIRIN 81 MG 81 MG: 81 TABLET ORAL at 09:09

## 2019-11-04 RX ADMIN — CIPROFLOXACIN 500 MG: 500 TABLET, FILM COATED ORAL at 20:46

## 2019-11-04 RX ADMIN — ENOXAPARIN SODIUM 40 MG: 40 INJECTION SUBCUTANEOUS at 09:10

## 2019-11-04 ASSESSMENT — PAIN SCALES - GENERAL
PAINLEVEL_OUTOF10: 0
PAINLEVEL_OUTOF10: 0

## 2019-11-05 LAB
GLUCOSE BLD-MCNC: 106 MG/DL (ref 60–115)
GLUCOSE BLD-MCNC: 109 MG/DL (ref 60–115)
GLUCOSE BLD-MCNC: 112 MG/DL (ref 60–115)
GLUCOSE BLD-MCNC: 115 MG/DL (ref 60–115)
PERFORMED ON: NORMAL

## 2019-11-05 PROCEDURE — 97127 HC SP THER IVNTJ W/FOCUS COG FUNCJ: CPT

## 2019-11-05 PROCEDURE — 99231 SBSQ HOSP IP/OBS SF/LOW 25: CPT | Performed by: PSYCHIATRY & NEUROLOGY

## 2019-11-05 PROCEDURE — 94640 AIRWAY INHALATION TREATMENT: CPT

## 2019-11-05 PROCEDURE — 6370000000 HC RX 637 (ALT 250 FOR IP): Performed by: PHYSICAL MEDICINE & REHABILITATION

## 2019-11-05 PROCEDURE — 99232 SBSQ HOSP IP/OBS MODERATE 35: CPT | Performed by: PHYSICAL MEDICINE & REHABILITATION

## 2019-11-05 PROCEDURE — 97535 SELF CARE MNGMENT TRAINING: CPT

## 2019-11-05 PROCEDURE — 97112 NEUROMUSCULAR REEDUCATION: CPT

## 2019-11-05 PROCEDURE — 97116 GAIT TRAINING THERAPY: CPT

## 2019-11-05 PROCEDURE — 1180000000 HC REHAB R&B

## 2019-11-05 PROCEDURE — 6370000000 HC RX 637 (ALT 250 FOR IP): Performed by: INTERNAL MEDICINE

## 2019-11-05 PROCEDURE — 97530 THERAPEUTIC ACTIVITIES: CPT

## 2019-11-05 PROCEDURE — 6360000002 HC RX W HCPCS: Performed by: INTERNAL MEDICINE

## 2019-11-05 PROCEDURE — 97110 THERAPEUTIC EXERCISES: CPT

## 2019-11-05 RX ORDER — L. ACIDOPHILUS/L.BULGARICUS 1MM CELL
2 TABLET ORAL 3 TIMES DAILY
Status: DISCONTINUED | OUTPATIENT
Start: 2019-11-05 | End: 2019-11-12 | Stop reason: HOSPADM

## 2019-11-05 RX ORDER — CIPROFLOXACIN 500 MG/1
500 TABLET, FILM COATED ORAL 2 TIMES DAILY WITH MEALS
Status: DISCONTINUED | OUTPATIENT
Start: 2019-11-05 | End: 2019-11-12

## 2019-11-05 RX ADMIN — ATORVASTATIN CALCIUM 80 MG: 80 TABLET, FILM COATED ORAL at 21:16

## 2019-11-05 RX ADMIN — LACTOBACILLUS TAB 2 TABLET: TAB at 09:09

## 2019-11-05 RX ADMIN — INSULIN GLARGINE 21 UNITS: 100 INJECTION, SOLUTION SUBCUTANEOUS at 21:18

## 2019-11-05 RX ADMIN — ENOXAPARIN SODIUM 40 MG: 40 INJECTION SUBCUTANEOUS at 09:08

## 2019-11-05 RX ADMIN — LACTOBACILLUS TAB 2 TABLET: TAB at 21:16

## 2019-11-05 RX ADMIN — BUDESONIDE 500 MCG: 0.5 INHALANT RESPIRATORY (INHALATION) at 15:42

## 2019-11-05 RX ADMIN — ISOSORBIDE MONONITRATE 30 MG: 30 TABLET, EXTENDED RELEASE ORAL at 09:08

## 2019-11-05 RX ADMIN — ASPIRIN 81 MG 81 MG: 81 TABLET ORAL at 09:08

## 2019-11-05 RX ADMIN — CIPROFLOXACIN 500 MG: 500 TABLET, FILM COATED ORAL at 17:24

## 2019-11-05 RX ADMIN — LACTOBACILLUS TAB 2 TABLET: TAB at 12:25

## 2019-11-05 RX ADMIN — LISINOPRIL 5 MG: 2.5 TABLET ORAL at 12:22

## 2019-11-05 RX ADMIN — CLOPIDOGREL BISULFATE 75 MG: 75 TABLET ORAL at 09:08

## 2019-11-05 RX ADMIN — HYDROCHLOROTHIAZIDE 12.5 MG: 25 TABLET ORAL at 09:09

## 2019-11-05 RX ADMIN — BUDESONIDE 500 MCG: 0.5 INHALANT RESPIRATORY (INHALATION) at 04:19

## 2019-11-05 ASSESSMENT — ENCOUNTER SYMPTOMS
TROUBLE SWALLOWING: 0
COLOR CHANGE: 0
SHORTNESS OF BREATH: 0
VOMITING: 0
CHEST TIGHTNESS: 0
NAUSEA: 0
WHEEZING: 0
COUGH: 0

## 2019-11-05 ASSESSMENT — PAIN SCALES - GENERAL
PAINLEVEL_OUTOF10: 0

## 2019-11-05 ASSESSMENT — PAIN SCALES - WONG BAKER: WONGBAKER_NUMERICALRESPONSE: 0

## 2019-11-06 LAB
GLUCOSE BLD-MCNC: 126 MG/DL (ref 60–115)
GLUCOSE BLD-MCNC: 189 MG/DL (ref 60–115)
GLUCOSE BLD-MCNC: 89 MG/DL (ref 60–115)
GLUCOSE BLD-MCNC: 94 MG/DL (ref 60–115)
PERFORMED ON: ABNORMAL
PERFORMED ON: ABNORMAL
PERFORMED ON: NORMAL
PERFORMED ON: NORMAL

## 2019-11-06 PROCEDURE — 1180000000 HC REHAB R&B

## 2019-11-06 PROCEDURE — 6360000002 HC RX W HCPCS: Performed by: PHYSICAL MEDICINE & REHABILITATION

## 2019-11-06 PROCEDURE — 97110 THERAPEUTIC EXERCISES: CPT

## 2019-11-06 PROCEDURE — 97116 GAIT TRAINING THERAPY: CPT

## 2019-11-06 PROCEDURE — 6370000000 HC RX 637 (ALT 250 FOR IP): Performed by: PHYSICAL MEDICINE & REHABILITATION

## 2019-11-06 PROCEDURE — 94640 AIRWAY INHALATION TREATMENT: CPT

## 2019-11-06 PROCEDURE — 97530 THERAPEUTIC ACTIVITIES: CPT

## 2019-11-06 PROCEDURE — 6360000002 HC RX W HCPCS: Performed by: INTERNAL MEDICINE

## 2019-11-06 PROCEDURE — 6370000000 HC RX 637 (ALT 250 FOR IP): Performed by: INTERNAL MEDICINE

## 2019-11-06 PROCEDURE — 99231 SBSQ HOSP IP/OBS SF/LOW 25: CPT | Performed by: PHYSICAL MEDICINE & REHABILITATION

## 2019-11-06 PROCEDURE — 97535 SELF CARE MNGMENT TRAINING: CPT

## 2019-11-06 PROCEDURE — 94761 N-INVAS EAR/PLS OXIMETRY MLT: CPT

## 2019-11-06 PROCEDURE — 97127 HC SP THER IVNTJ W/FOCUS COG FUNCJ: CPT

## 2019-11-06 RX ADMIN — CIPROFLOXACIN 500 MG: 500 TABLET, FILM COATED ORAL at 18:35

## 2019-11-06 RX ADMIN — LACTOBACILLUS TAB 2 TABLET: TAB at 12:57

## 2019-11-06 RX ADMIN — ASPIRIN 81 MG 81 MG: 81 TABLET ORAL at 08:29

## 2019-11-06 RX ADMIN — HYDROCHLOROTHIAZIDE 12.5 MG: 25 TABLET ORAL at 08:26

## 2019-11-06 RX ADMIN — ATORVASTATIN CALCIUM 80 MG: 80 TABLET, FILM COATED ORAL at 22:02

## 2019-11-06 RX ADMIN — CIPROFLOXACIN 500 MG: 500 TABLET, FILM COATED ORAL at 08:28

## 2019-11-06 RX ADMIN — INSULIN GLARGINE 21 UNITS: 100 INJECTION, SOLUTION SUBCUTANEOUS at 22:02

## 2019-11-06 RX ADMIN — BUDESONIDE 500 MCG: 0.5 INHALANT RESPIRATORY (INHALATION) at 17:25

## 2019-11-06 RX ADMIN — CLOPIDOGREL BISULFATE 75 MG: 75 TABLET ORAL at 08:29

## 2019-11-06 RX ADMIN — ENOXAPARIN SODIUM 40 MG: 40 INJECTION SUBCUTANEOUS at 08:31

## 2019-11-06 RX ADMIN — ERGOCALCIFEROL 50000 UNITS: 1.25 CAPSULE ORAL at 08:26

## 2019-11-06 RX ADMIN — LACTOBACILLUS TAB 2 TABLET: TAB at 08:28

## 2019-11-06 RX ADMIN — CYANOCOBALAMIN 1000 MCG: 1000 INJECTION, SOLUTION INTRAMUSCULAR; SUBCUTANEOUS at 08:29

## 2019-11-06 RX ADMIN — MAGNESIUM HYDROXIDE 30 ML: 400 SUSPENSION ORAL at 08:33

## 2019-11-06 RX ADMIN — LACTOBACILLUS TAB 2 TABLET: TAB at 22:02

## 2019-11-06 RX ADMIN — ISOSORBIDE MONONITRATE 30 MG: 30 TABLET, EXTENDED RELEASE ORAL at 08:28

## 2019-11-06 RX ADMIN — BUDESONIDE 500 MCG: 0.5 INHALANT RESPIRATORY (INHALATION) at 04:56

## 2019-11-06 RX ADMIN — LISINOPRIL 5 MG: 2.5 TABLET ORAL at 08:26

## 2019-11-06 ASSESSMENT — PAIN SCALES - GENERAL
PAINLEVEL_OUTOF10: 0
PAINLEVEL_OUTOF10: 0

## 2019-11-06 ASSESSMENT — PAIN SCALES - WONG BAKER: WONGBAKER_NUMERICALRESPONSE: 0

## 2019-11-07 LAB
ANION GAP SERPL CALCULATED.3IONS-SCNC: 11 MEQ/L (ref 9–15)
BUN BLDV-MCNC: 19 MG/DL (ref 8–23)
CALCIUM SERPL-MCNC: 8.9 MG/DL (ref 8.5–9.9)
CHLORIDE BLD-SCNC: 98 MEQ/L (ref 95–107)
CO2: 27 MEQ/L (ref 20–31)
CREAT SERPL-MCNC: 0.67 MG/DL (ref 0.5–0.9)
GFR AFRICAN AMERICAN: >60
GFR NON-AFRICAN AMERICAN: >60
GLUCOSE BLD-MCNC: 104 MG/DL (ref 60–115)
GLUCOSE BLD-MCNC: 160 MG/DL (ref 60–115)
GLUCOSE BLD-MCNC: 166 MG/DL (ref 70–99)
GLUCOSE BLD-MCNC: 179 MG/DL (ref 60–115)
GLUCOSE BLD-MCNC: 94 MG/DL (ref 60–115)
PERFORMED ON: ABNORMAL
PERFORMED ON: ABNORMAL
PERFORMED ON: NORMAL
PERFORMED ON: NORMAL
POTASSIUM SERPL-SCNC: 4.1 MEQ/L (ref 3.4–4.9)
SODIUM BLD-SCNC: 136 MEQ/L (ref 135–144)

## 2019-11-07 PROCEDURE — 94640 AIRWAY INHALATION TREATMENT: CPT

## 2019-11-07 PROCEDURE — 6370000000 HC RX 637 (ALT 250 FOR IP): Performed by: INTERNAL MEDICINE

## 2019-11-07 PROCEDURE — 6360000002 HC RX W HCPCS: Performed by: INTERNAL MEDICINE

## 2019-11-07 PROCEDURE — 1180000000 HC REHAB R&B

## 2019-11-07 PROCEDURE — 6370000000 HC RX 637 (ALT 250 FOR IP): Performed by: PHYSICAL MEDICINE & REHABILITATION

## 2019-11-07 PROCEDURE — 94761 N-INVAS EAR/PLS OXIMETRY MLT: CPT

## 2019-11-07 PROCEDURE — 80048 BASIC METABOLIC PNL TOTAL CA: CPT

## 2019-11-07 PROCEDURE — 97535 SELF CARE MNGMENT TRAINING: CPT

## 2019-11-07 PROCEDURE — 97112 NEUROMUSCULAR REEDUCATION: CPT

## 2019-11-07 PROCEDURE — 97127 HC SP THER IVNTJ W/FOCUS COG FUNCJ: CPT

## 2019-11-07 PROCEDURE — 36415 COLL VENOUS BLD VENIPUNCTURE: CPT

## 2019-11-07 PROCEDURE — 99233 SBSQ HOSP IP/OBS HIGH 50: CPT | Performed by: PHYSICAL MEDICINE & REHABILITATION

## 2019-11-07 PROCEDURE — 97530 THERAPEUTIC ACTIVITIES: CPT

## 2019-11-07 PROCEDURE — 99222 1ST HOSP IP/OBS MODERATE 55: CPT | Performed by: INTERNAL MEDICINE

## 2019-11-07 PROCEDURE — 97116 GAIT TRAINING THERAPY: CPT

## 2019-11-07 PROCEDURE — 97110 THERAPEUTIC EXERCISES: CPT

## 2019-11-07 RX ORDER — INSULIN GLARGINE 100 [IU]/ML
14 INJECTION, SOLUTION SUBCUTANEOUS NIGHTLY
Status: DISCONTINUED | OUTPATIENT
Start: 2019-11-07 | End: 2019-11-12 | Stop reason: HOSPADM

## 2019-11-07 RX ORDER — POLYETHYLENE GLYCOL 3350 17 G/17G
17 POWDER, FOR SOLUTION ORAL DAILY PRN
Status: DISCONTINUED | OUTPATIENT
Start: 2019-11-07 | End: 2019-11-12 | Stop reason: HOSPADM

## 2019-11-07 RX ADMIN — HYDROCHLOROTHIAZIDE 12.5 MG: 25 TABLET ORAL at 08:59

## 2019-11-07 RX ADMIN — CIPROFLOXACIN 500 MG: 500 TABLET, FILM COATED ORAL at 09:00

## 2019-11-07 RX ADMIN — LACTOBACILLUS TAB 2 TABLET: TAB at 13:34

## 2019-11-07 RX ADMIN — INSULIN GLARGINE 14 UNITS: 100 INJECTION, SOLUTION SUBCUTANEOUS at 23:28

## 2019-11-07 RX ADMIN — CIPROFLOXACIN 500 MG: 500 TABLET, FILM COATED ORAL at 16:19

## 2019-11-07 RX ADMIN — CLOPIDOGREL BISULFATE 75 MG: 75 TABLET ORAL at 09:00

## 2019-11-07 RX ADMIN — POLYETHYLENE GLYCOL 3350 17 G: 17 POWDER, FOR SOLUTION ORAL at 19:42

## 2019-11-07 RX ADMIN — IPRATROPIUM BROMIDE AND ALBUTEROL SULFATE 3 ML: .5; 3 SOLUTION RESPIRATORY (INHALATION) at 16:05

## 2019-11-07 RX ADMIN — INSULIN LISPRO 2 UNITS: 100 INJECTION, SOLUTION INTRAVENOUS; SUBCUTANEOUS at 17:42

## 2019-11-07 RX ADMIN — LACTOBACILLUS TAB 2 TABLET: TAB at 09:00

## 2019-11-07 RX ADMIN — ISOSORBIDE MONONITRATE 30 MG: 30 TABLET, EXTENDED RELEASE ORAL at 09:00

## 2019-11-07 RX ADMIN — BUDESONIDE 500 MCG: 0.5 INHALANT RESPIRATORY (INHALATION) at 16:00

## 2019-11-07 RX ADMIN — ENOXAPARIN SODIUM 40 MG: 40 INJECTION SUBCUTANEOUS at 09:01

## 2019-11-07 RX ADMIN — ATORVASTATIN CALCIUM 80 MG: 80 TABLET, FILM COATED ORAL at 19:42

## 2019-11-07 RX ADMIN — ASPIRIN 81 MG 81 MG: 81 TABLET ORAL at 09:00

## 2019-11-07 RX ADMIN — LISINOPRIL 5 MG: 2.5 TABLET ORAL at 08:59

## 2019-11-07 RX ADMIN — BUDESONIDE 500 MCG: 0.5 INHALANT RESPIRATORY (INHALATION) at 04:20

## 2019-11-07 RX ADMIN — LACTOBACILLUS TAB 2 TABLET: TAB at 19:43

## 2019-11-07 ASSESSMENT — PAIN SCALES - GENERAL: PAINLEVEL_OUTOF10: 0

## 2019-11-08 LAB
GLUCOSE BLD-MCNC: 102 MG/DL (ref 60–115)
GLUCOSE BLD-MCNC: 133 MG/DL (ref 60–115)
GLUCOSE BLD-MCNC: 140 MG/DL (ref 60–115)
GLUCOSE BLD-MCNC: 144 MG/DL (ref 60–115)
PERFORMED ON: ABNORMAL
PERFORMED ON: NORMAL

## 2019-11-08 PROCEDURE — 94640 AIRWAY INHALATION TREATMENT: CPT

## 2019-11-08 PROCEDURE — 97127 HC SP THER IVNTJ W/FOCUS COG FUNCJ: CPT

## 2019-11-08 PROCEDURE — 6370000000 HC RX 637 (ALT 250 FOR IP): Performed by: PHYSICAL MEDICINE & REHABILITATION

## 2019-11-08 PROCEDURE — 6360000002 HC RX W HCPCS: Performed by: INTERNAL MEDICINE

## 2019-11-08 PROCEDURE — 99231 SBSQ HOSP IP/OBS SF/LOW 25: CPT | Performed by: UROLOGY

## 2019-11-08 PROCEDURE — 6370000000 HC RX 637 (ALT 250 FOR IP): Performed by: INTERNAL MEDICINE

## 2019-11-08 PROCEDURE — 99231 SBSQ HOSP IP/OBS SF/LOW 25: CPT | Performed by: PSYCHIATRY & NEUROLOGY

## 2019-11-08 PROCEDURE — 94761 N-INVAS EAR/PLS OXIMETRY MLT: CPT

## 2019-11-08 PROCEDURE — 97112 NEUROMUSCULAR REEDUCATION: CPT

## 2019-11-08 PROCEDURE — 97116 GAIT TRAINING THERAPY: CPT

## 2019-11-08 PROCEDURE — 87086 URINE CULTURE/COLONY COUNT: CPT

## 2019-11-08 PROCEDURE — 1180000000 HC REHAB R&B

## 2019-11-08 PROCEDURE — 99232 SBSQ HOSP IP/OBS MODERATE 35: CPT | Performed by: PHYSICAL MEDICINE & REHABILITATION

## 2019-11-08 PROCEDURE — 51798 US URINE CAPACITY MEASURE: CPT

## 2019-11-08 PROCEDURE — 97535 SELF CARE MNGMENT TRAINING: CPT

## 2019-11-08 PROCEDURE — 97530 THERAPEUTIC ACTIVITIES: CPT

## 2019-11-08 PROCEDURE — 99232 SBSQ HOSP IP/OBS MODERATE 35: CPT | Performed by: INTERNAL MEDICINE

## 2019-11-08 RX ADMIN — ISOSORBIDE MONONITRATE 30 MG: 30 TABLET, EXTENDED RELEASE ORAL at 09:50

## 2019-11-08 RX ADMIN — ENOXAPARIN SODIUM 40 MG: 40 INJECTION SUBCUTANEOUS at 09:49

## 2019-11-08 RX ADMIN — CIPROFLOXACIN 500 MG: 500 TABLET, FILM COATED ORAL at 09:50

## 2019-11-08 RX ADMIN — ASPIRIN 81 MG 81 MG: 81 TABLET ORAL at 09:50

## 2019-11-08 RX ADMIN — HYDROCHLOROTHIAZIDE 12.5 MG: 25 TABLET ORAL at 09:50

## 2019-11-08 RX ADMIN — CIPROFLOXACIN 500 MG: 500 TABLET, FILM COATED ORAL at 17:10

## 2019-11-08 RX ADMIN — BUDESONIDE 500 MCG: 0.5 INHALANT RESPIRATORY (INHALATION) at 16:33

## 2019-11-08 RX ADMIN — INSULIN LISPRO 2 UNITS: 100 INJECTION, SOLUTION INTRAVENOUS; SUBCUTANEOUS at 17:09

## 2019-11-08 RX ADMIN — IPRATROPIUM BROMIDE AND ALBUTEROL SULFATE 3 ML: .5; 3 SOLUTION RESPIRATORY (INHALATION) at 04:47

## 2019-11-08 RX ADMIN — LACTOBACILLUS TAB 2 TABLET: TAB at 21:02

## 2019-11-08 RX ADMIN — CLOPIDOGREL BISULFATE 75 MG: 75 TABLET ORAL at 09:50

## 2019-11-08 RX ADMIN — BUDESONIDE 500 MCG: 0.5 INHALANT RESPIRATORY (INHALATION) at 04:47

## 2019-11-08 RX ADMIN — LACTOBACILLUS TAB 2 TABLET: TAB at 14:49

## 2019-11-08 RX ADMIN — ATORVASTATIN CALCIUM 80 MG: 80 TABLET, FILM COATED ORAL at 21:02

## 2019-11-08 RX ADMIN — LACTOBACILLUS TAB 2 TABLET: TAB at 09:49

## 2019-11-08 RX ADMIN — INSULIN GLARGINE 14 UNITS: 100 INJECTION, SOLUTION SUBCUTANEOUS at 21:02

## 2019-11-08 ASSESSMENT — PAIN SCALES - WONG BAKER
WONGBAKER_NUMERICALRESPONSE: 0

## 2019-11-08 ASSESSMENT — PAIN SCALES - GENERAL
PAINLEVEL_OUTOF10: 0

## 2019-11-09 LAB
GLUCOSE BLD-MCNC: 159 MG/DL (ref 60–115)
GLUCOSE BLD-MCNC: 188 MG/DL (ref 60–115)
GLUCOSE BLD-MCNC: 208 MG/DL (ref 60–115)
GLUCOSE BLD-MCNC: 82 MG/DL (ref 60–115)
PERFORMED ON: ABNORMAL
PERFORMED ON: NORMAL
URINE CULTURE, ROUTINE: NORMAL

## 2019-11-09 PROCEDURE — 6370000000 HC RX 637 (ALT 250 FOR IP): Performed by: INTERNAL MEDICINE

## 2019-11-09 PROCEDURE — 97110 THERAPEUTIC EXERCISES: CPT

## 2019-11-09 PROCEDURE — 99232 SBSQ HOSP IP/OBS MODERATE 35: CPT | Performed by: PHYSICAL MEDICINE & REHABILITATION

## 2019-11-09 PROCEDURE — 6360000002 HC RX W HCPCS: Performed by: INTERNAL MEDICINE

## 2019-11-09 PROCEDURE — 1180000000 HC REHAB R&B

## 2019-11-09 PROCEDURE — 6370000000 HC RX 637 (ALT 250 FOR IP): Performed by: PHYSICAL MEDICINE & REHABILITATION

## 2019-11-09 PROCEDURE — 94640 AIRWAY INHALATION TREATMENT: CPT

## 2019-11-09 PROCEDURE — 97116 GAIT TRAINING THERAPY: CPT

## 2019-11-09 PROCEDURE — 51798 US URINE CAPACITY MEASURE: CPT

## 2019-11-09 PROCEDURE — 97535 SELF CARE MNGMENT TRAINING: CPT

## 2019-11-09 PROCEDURE — 94761 N-INVAS EAR/PLS OXIMETRY MLT: CPT

## 2019-11-09 PROCEDURE — 97530 THERAPEUTIC ACTIVITIES: CPT

## 2019-11-09 RX ORDER — LACTULOSE 10 G/15ML
20 SOLUTION ORAL 2 TIMES DAILY PRN
Status: DISCONTINUED | OUTPATIENT
Start: 2019-11-09 | End: 2019-11-12 | Stop reason: HOSPADM

## 2019-11-09 RX ADMIN — BUDESONIDE 500 MCG: 0.5 INHALANT RESPIRATORY (INHALATION) at 04:48

## 2019-11-09 RX ADMIN — ENOXAPARIN SODIUM 40 MG: 40 INJECTION SUBCUTANEOUS at 09:18

## 2019-11-09 RX ADMIN — LISINOPRIL 5 MG: 2.5 TABLET ORAL at 09:13

## 2019-11-09 RX ADMIN — ASPIRIN 81 MG 81 MG: 81 TABLET ORAL at 09:16

## 2019-11-09 RX ADMIN — BUDESONIDE 500 MCG: 0.5 INHALANT RESPIRATORY (INHALATION) at 17:58

## 2019-11-09 RX ADMIN — LACTOBACILLUS TAB 2 TABLET: TAB at 22:05

## 2019-11-09 RX ADMIN — LACTULOSE 20 G: 20 SOLUTION ORAL at 12:32

## 2019-11-09 RX ADMIN — HYDROCHLOROTHIAZIDE 12.5 MG: 25 TABLET ORAL at 09:14

## 2019-11-09 RX ADMIN — CIPROFLOXACIN 500 MG: 500 TABLET, FILM COATED ORAL at 09:16

## 2019-11-09 RX ADMIN — CIPROFLOXACIN 500 MG: 500 TABLET, FILM COATED ORAL at 16:59

## 2019-11-09 RX ADMIN — INSULIN GLARGINE 14 UNITS: 100 INJECTION, SOLUTION SUBCUTANEOUS at 22:05

## 2019-11-09 RX ADMIN — LACTOBACILLUS TAB 2 TABLET: TAB at 09:13

## 2019-11-09 RX ADMIN — INSULIN LISPRO 2 UNITS: 100 INJECTION, SOLUTION INTRAVENOUS; SUBCUTANEOUS at 16:59

## 2019-11-09 RX ADMIN — LACTOBACILLUS TAB 2 TABLET: TAB at 12:24

## 2019-11-09 RX ADMIN — CLOPIDOGREL BISULFATE 75 MG: 75 TABLET ORAL at 09:13

## 2019-11-09 RX ADMIN — ISOSORBIDE MONONITRATE 30 MG: 30 TABLET, EXTENDED RELEASE ORAL at 09:14

## 2019-11-09 RX ADMIN — INSULIN LISPRO 4 UNITS: 100 INJECTION, SOLUTION INTRAVENOUS; SUBCUTANEOUS at 09:18

## 2019-11-09 RX ADMIN — ATORVASTATIN CALCIUM 80 MG: 80 TABLET, FILM COATED ORAL at 22:05

## 2019-11-09 ASSESSMENT — PAIN SCALES - WONG BAKER

## 2019-11-09 ASSESSMENT — PAIN SCALES - GENERAL
PAINLEVEL_OUTOF10: 0
PAINLEVEL_OUTOF10: 0

## 2019-11-10 LAB
GLUCOSE BLD-MCNC: 103 MG/DL (ref 60–115)
GLUCOSE BLD-MCNC: 104 MG/DL (ref 60–115)
GLUCOSE BLD-MCNC: 152 MG/DL (ref 60–115)
GLUCOSE BLD-MCNC: 193 MG/DL (ref 60–115)
PERFORMED ON: ABNORMAL
PERFORMED ON: ABNORMAL
PERFORMED ON: NORMAL
PERFORMED ON: NORMAL

## 2019-11-10 PROCEDURE — 94640 AIRWAY INHALATION TREATMENT: CPT

## 2019-11-10 PROCEDURE — 99231 SBSQ HOSP IP/OBS SF/LOW 25: CPT | Performed by: PHYSICAL MEDICINE & REHABILITATION

## 2019-11-10 PROCEDURE — 1180000000 HC REHAB R&B

## 2019-11-10 PROCEDURE — 6360000002 HC RX W HCPCS: Performed by: INTERNAL MEDICINE

## 2019-11-10 PROCEDURE — 6370000000 HC RX 637 (ALT 250 FOR IP): Performed by: INTERNAL MEDICINE

## 2019-11-10 PROCEDURE — 6370000000 HC RX 637 (ALT 250 FOR IP): Performed by: PHYSICAL MEDICINE & REHABILITATION

## 2019-11-10 RX ADMIN — BUDESONIDE 500 MCG: 0.5 INHALANT RESPIRATORY (INHALATION) at 16:11

## 2019-11-10 RX ADMIN — CIPROFLOXACIN 500 MG: 500 TABLET, FILM COATED ORAL at 17:43

## 2019-11-10 RX ADMIN — LACTOBACILLUS TAB 2 TABLET: TAB at 21:20

## 2019-11-10 RX ADMIN — HYDROCHLOROTHIAZIDE 12.5 MG: 25 TABLET ORAL at 08:50

## 2019-11-10 RX ADMIN — ISOSORBIDE MONONITRATE 30 MG: 30 TABLET, EXTENDED RELEASE ORAL at 08:49

## 2019-11-10 RX ADMIN — LACTOBACILLUS TAB 2 TABLET: TAB at 08:49

## 2019-11-10 RX ADMIN — ENOXAPARIN SODIUM 40 MG: 40 INJECTION SUBCUTANEOUS at 08:55

## 2019-11-10 RX ADMIN — ASPIRIN 81 MG 81 MG: 81 TABLET ORAL at 08:50

## 2019-11-10 RX ADMIN — INSULIN LISPRO 2 UNITS: 100 INJECTION, SOLUTION INTRAVENOUS; SUBCUTANEOUS at 08:55

## 2019-11-10 RX ADMIN — INSULIN GLARGINE 14 UNITS: 100 INJECTION, SOLUTION SUBCUTANEOUS at 21:22

## 2019-11-10 RX ADMIN — LISINOPRIL 5 MG: 2.5 TABLET ORAL at 08:49

## 2019-11-10 RX ADMIN — ATORVASTATIN CALCIUM 80 MG: 80 TABLET, FILM COATED ORAL at 21:20

## 2019-11-10 RX ADMIN — CIPROFLOXACIN 500 MG: 500 TABLET, FILM COATED ORAL at 08:50

## 2019-11-10 RX ADMIN — CLOPIDOGREL BISULFATE 75 MG: 75 TABLET ORAL at 08:49

## 2019-11-10 RX ADMIN — LACTOBACILLUS TAB 2 TABLET: TAB at 13:52

## 2019-11-11 LAB
GLUCOSE BLD-MCNC: 123 MG/DL (ref 60–115)
GLUCOSE BLD-MCNC: 131 MG/DL (ref 60–115)
GLUCOSE BLD-MCNC: 135 MG/DL (ref 60–115)
GLUCOSE BLD-MCNC: 138 MG/DL (ref 60–115)
PERFORMED ON: ABNORMAL

## 2019-11-11 PROCEDURE — 99232 SBSQ HOSP IP/OBS MODERATE 35: CPT | Performed by: PSYCHIATRY & NEUROLOGY

## 2019-11-11 PROCEDURE — 97110 THERAPEUTIC EXERCISES: CPT

## 2019-11-11 PROCEDURE — 6360000002 HC RX W HCPCS: Performed by: INTERNAL MEDICINE

## 2019-11-11 PROCEDURE — 6370000000 HC RX 637 (ALT 250 FOR IP): Performed by: INTERNAL MEDICINE

## 2019-11-11 PROCEDURE — 99232 SBSQ HOSP IP/OBS MODERATE 35: CPT | Performed by: PHYSICIAN ASSISTANT

## 2019-11-11 PROCEDURE — 99231 SBSQ HOSP IP/OBS SF/LOW 25: CPT | Performed by: PHYSICAL MEDICINE & REHABILITATION

## 2019-11-11 PROCEDURE — 97127 HC SP THER IVNTJ W/FOCUS COG FUNCJ: CPT

## 2019-11-11 PROCEDURE — 6370000000 HC RX 637 (ALT 250 FOR IP): Performed by: PHYSICAL MEDICINE & REHABILITATION

## 2019-11-11 PROCEDURE — 94640 AIRWAY INHALATION TREATMENT: CPT

## 2019-11-11 PROCEDURE — 97535 SELF CARE MNGMENT TRAINING: CPT

## 2019-11-11 PROCEDURE — 94761 N-INVAS EAR/PLS OXIMETRY MLT: CPT

## 2019-11-11 PROCEDURE — 1180000000 HC REHAB R&B

## 2019-11-11 PROCEDURE — 97116 GAIT TRAINING THERAPY: CPT

## 2019-11-11 PROCEDURE — 97530 THERAPEUTIC ACTIVITIES: CPT

## 2019-11-11 RX ADMIN — BUDESONIDE 500 MCG: 0.5 INHALANT RESPIRATORY (INHALATION) at 04:13

## 2019-11-11 RX ADMIN — CIPROFLOXACIN 500 MG: 500 TABLET, FILM COATED ORAL at 08:47

## 2019-11-11 RX ADMIN — LACTOBACILLUS TAB 2 TABLET: TAB at 21:07

## 2019-11-11 RX ADMIN — CIPROFLOXACIN 500 MG: 500 TABLET, FILM COATED ORAL at 16:43

## 2019-11-11 RX ADMIN — ASPIRIN 81 MG 81 MG: 81 TABLET ORAL at 08:47

## 2019-11-11 RX ADMIN — BUDESONIDE 500 MCG: 0.5 INHALANT RESPIRATORY (INHALATION) at 16:10

## 2019-11-11 RX ADMIN — ISOSORBIDE MONONITRATE 30 MG: 30 TABLET, EXTENDED RELEASE ORAL at 08:46

## 2019-11-11 RX ADMIN — ENOXAPARIN SODIUM 40 MG: 40 INJECTION SUBCUTANEOUS at 08:46

## 2019-11-11 RX ADMIN — INSULIN GLARGINE 14 UNITS: 100 INJECTION, SOLUTION SUBCUTANEOUS at 21:11

## 2019-11-11 RX ADMIN — HYDROCHLOROTHIAZIDE 12.5 MG: 25 TABLET ORAL at 08:46

## 2019-11-11 RX ADMIN — LACTOBACILLUS TAB 2 TABLET: TAB at 16:45

## 2019-11-11 RX ADMIN — ATORVASTATIN CALCIUM 80 MG: 80 TABLET, FILM COATED ORAL at 21:07

## 2019-11-11 RX ADMIN — LACTOBACILLUS TAB 2 TABLET: TAB at 08:46

## 2019-11-11 RX ADMIN — CLOPIDOGREL BISULFATE 75 MG: 75 TABLET ORAL at 08:47

## 2019-11-11 ASSESSMENT — ENCOUNTER SYMPTOMS
COLOR CHANGE: 0
CHEST TIGHTNESS: 0
WHEEZING: 0
COUGH: 0
VOMITING: 0
NAUSEA: 0
BACK PAIN: 0
SHORTNESS OF BREATH: 0
ABDOMINAL PAIN: 0
TROUBLE SWALLOWING: 0

## 2019-11-11 ASSESSMENT — PAIN SCALES - GENERAL
PAINLEVEL_OUTOF10: 0

## 2019-11-11 ASSESSMENT — PAIN SCALES - WONG BAKER
WONGBAKER_NUMERICALRESPONSE: 0

## 2019-11-12 VITALS
OXYGEN SATURATION: 93 % | RESPIRATION RATE: 17 BRPM | BODY MASS INDEX: 35.61 KG/M2 | DIASTOLIC BLOOD PRESSURE: 76 MMHG | HEIGHT: 63 IN | TEMPERATURE: 97 F | SYSTOLIC BLOOD PRESSURE: 135 MMHG | WEIGHT: 201 LBS | HEART RATE: 86 BPM

## 2019-11-12 LAB
GLUCOSE BLD-MCNC: 112 MG/DL (ref 60–115)
GLUCOSE BLD-MCNC: 116 MG/DL (ref 60–115)
GLUCOSE BLD-MCNC: 128 MG/DL (ref 60–115)
PERFORMED ON: ABNORMAL
PERFORMED ON: ABNORMAL
PERFORMED ON: NORMAL

## 2019-11-12 PROCEDURE — 99238 HOSP IP/OBS DSCHRG MGMT 30/<: CPT | Performed by: PHYSICAL MEDICINE & REHABILITATION

## 2019-11-12 PROCEDURE — 6370000000 HC RX 637 (ALT 250 FOR IP): Performed by: PHYSICAL MEDICINE & REHABILITATION

## 2019-11-12 PROCEDURE — 6360000002 HC RX W HCPCS: Performed by: INTERNAL MEDICINE

## 2019-11-12 PROCEDURE — 94640 AIRWAY INHALATION TREATMENT: CPT

## 2019-11-12 PROCEDURE — 99231 SBSQ HOSP IP/OBS SF/LOW 25: CPT | Performed by: UROLOGY

## 2019-11-12 PROCEDURE — 94761 N-INVAS EAR/PLS OXIMETRY MLT: CPT

## 2019-11-12 PROCEDURE — 6370000000 HC RX 637 (ALT 250 FOR IP): Performed by: INTERNAL MEDICINE

## 2019-11-12 RX ADMIN — LACTOBACILLUS TAB 2 TABLET: TAB at 08:47

## 2019-11-12 RX ADMIN — ASPIRIN 81 MG 81 MG: 81 TABLET ORAL at 08:47

## 2019-11-12 RX ADMIN — BUDESONIDE 500 MCG: 0.5 INHALANT RESPIRATORY (INHALATION) at 04:06

## 2019-11-12 RX ADMIN — ISOSORBIDE MONONITRATE 30 MG: 30 TABLET, EXTENDED RELEASE ORAL at 08:48

## 2019-11-12 RX ADMIN — CIPROFLOXACIN 500 MG: 500 TABLET, FILM COATED ORAL at 06:22

## 2019-11-12 RX ADMIN — LISINOPRIL 5 MG: 2.5 TABLET ORAL at 08:49

## 2019-11-12 RX ADMIN — HYDROCHLOROTHIAZIDE 12.5 MG: 25 TABLET ORAL at 08:47

## 2019-11-12 RX ADMIN — LACTOBACILLUS TAB 2 TABLET: TAB at 13:21

## 2019-11-12 RX ADMIN — CLOPIDOGREL BISULFATE 75 MG: 75 TABLET ORAL at 08:48

## 2019-11-12 RX ADMIN — BUDESONIDE 500 MCG: 0.5 INHALANT RESPIRATORY (INHALATION) at 16:13

## 2019-11-12 ASSESSMENT — PAIN SCALES - WONG BAKER
WONGBAKER_NUMERICALRESPONSE: 0

## 2019-11-12 ASSESSMENT — PAIN SCALES - GENERAL
PAINLEVEL_OUTOF10: 0
PAINLEVEL_OUTOF10: 0

## 2019-11-20 ENCOUNTER — TELEPHONE (OUTPATIENT)
Dept: DIABETES SERVICES | Age: 69
End: 2019-11-20

## 2019-11-20 NOTE — PROGRESS NOTES
Called pt and she stated she does not have transportation or money for [facility] bus. She is not interested in coming. States she is doing good with her numbers and doing her exercises/therapy and doing good with her diet. Will sent letter to .  I see she also missed apt with Harriet Murrieta today.

## 2019-12-18 ENCOUNTER — OFFICE VISIT (OUTPATIENT)
Dept: NEUROLOGY | Age: 69
End: 2019-12-18
Payer: MEDICARE

## 2019-12-18 VITALS
HEIGHT: 63 IN | BODY MASS INDEX: 33.66 KG/M2 | DIASTOLIC BLOOD PRESSURE: 70 MMHG | SYSTOLIC BLOOD PRESSURE: 122 MMHG | WEIGHT: 190 LBS | HEART RATE: 96 BPM

## 2019-12-18 DIAGNOSIS — R29.898 RIGHT LEG WEAKNESS: ICD-10-CM

## 2019-12-18 DIAGNOSIS — I63.9 ACUTE CEREBROVASCULAR ACCIDENT (CVA) (HCC): Primary | ICD-10-CM

## 2019-12-18 DIAGNOSIS — I63.9 CEREBROVASCULAR ACCIDENT (CVA) DETERMINED BY CLINICAL ASSESSMENT (HCC): ICD-10-CM

## 2019-12-18 PROCEDURE — 99215 OFFICE O/P EST HI 40 MIN: CPT | Performed by: PSYCHIATRY & NEUROLOGY

## 2019-12-18 RX ORDER — AMLODIPINE BESYLATE 10 MG/1
TABLET ORAL
COMMUNITY
Start: 2019-10-11

## 2019-12-18 RX ORDER — DICLOFENAC SODIUM 75 MG/1
75 TABLET, DELAYED RELEASE ORAL
COMMUNITY
Start: 2017-12-05

## 2019-12-18 RX ORDER — MONTELUKAST SODIUM 10 MG/1
TABLET ORAL
COMMUNITY
Start: 2018-05-03

## 2019-12-18 RX ORDER — OXYBUTYNIN CHLORIDE 5 MG/1
TABLET ORAL
COMMUNITY
Start: 2019-05-28

## 2019-12-18 RX ORDER — LABETALOL 20 MG/4 ML (5 MG/ML) INTRAVENOUS SYRINGE
10
COMMUNITY
Start: 2019-10-18

## 2019-12-18 RX ORDER — ESOMEPRAZOLE MAGNESIUM 40 MG/1
40 CAPSULE, DELAYED RELEASE ORAL
COMMUNITY

## 2019-12-18 ASSESSMENT — ENCOUNTER SYMPTOMS
TROUBLE SWALLOWING: 0
NAUSEA: 0
SHORTNESS OF BREATH: 0
VOMITING: 0
BACK PAIN: 0
CHOKING: 0
PHOTOPHOBIA: 0